# Patient Record
Sex: MALE | Race: WHITE | NOT HISPANIC OR LATINO | Employment: OTHER | URBAN - METROPOLITAN AREA
[De-identification: names, ages, dates, MRNs, and addresses within clinical notes are randomized per-mention and may not be internally consistent; named-entity substitution may affect disease eponyms.]

---

## 2017-10-30 ENCOUNTER — HOSPITAL ENCOUNTER (EMERGENCY)
Facility: HOSPITAL | Age: 22
Discharge: HOME | End: 2017-10-30
Admitting: EMERGENCY MEDICINE
Payer: MEDICARE

## 2017-10-30 VITALS
HEART RATE: 86 BPM | DIASTOLIC BLOOD PRESSURE: 91 MMHG | RESPIRATION RATE: 18 BRPM | WEIGHT: 188 LBS | TEMPERATURE: 98.5 F | SYSTOLIC BLOOD PRESSURE: 162 MMHG | OXYGEN SATURATION: 100 %

## 2017-10-30 DIAGNOSIS — Z20.2 POSSIBLE EXPOSURE TO STD: Primary | ICD-10-CM

## 2017-10-30 PROCEDURE — 87591 N.GONORRHOEAE DNA AMP PROB: CPT | Performed by: NURSE PRACTITIONER

## 2017-10-30 PROCEDURE — 87661 TRICHOMONAS VAGINALIS AMPLIF: CPT | Performed by: NURSE PRACTITIONER

## 2017-10-30 PROCEDURE — 99283 EMERGENCY DEPT VISIT LOW MDM: CPT

## 2017-10-30 PROCEDURE — 87491 CHLMYD TRACH DNA AMP PROBE: CPT | Performed by: NURSE PRACTITIONER

## 2017-10-31 NOTE — ED PROVIDER NOTES
History  Chief Complaint   Patient presents with    Exposure to STD     states "i had the thing come back from a couple years ago, i was at [employer] and they gave me pills, i just need to check if its clear" states was treated by [facility], reports "i still feel it a tiny bit" when asked re sx  This is a 20year old male who states he was treated earlier in October at [address] for chlamydia with 1000mg azithromcyin  He states that his girlfriend was treated and is also here for ? Exposure to STD  Pt states that he is here because         Exposure to STD       None       Past Medical History:   Diagnosis Date    Scoliosis        Past Surgical History:   Procedure Laterality Date    NO PAST SURGERIES         History reviewed  No pertinent family history  I have reviewed and agree with the history as documented      Social History   Substance Use Topics    Smoking status: Current Every Day Smoker    Smokeless tobacco: Never Used    Alcohol use No        Review of Systems    Physical Exam  ED Triage Vitals   Temperature Pulse Respirations Blood Pressure SpO2   10/30/17 2202 10/30/17 2201 10/30/17 2201 10/30/17 2201 10/30/17 2201   98 5 °F (36 9 °C) 86 18 162/91 100 %      Temp Source Heart Rate Source Patient Position - Orthostatic VS BP Location FiO2 (%)   10/30/17 2202 -- 10/30/17 2201 10/30/17 2201 --   Temporal  Sitting Right arm       Pain Score       10/30/17 2201       No Pain           Orthostatic Vital Signs  Vitals:    10/30/17 2201   BP: 162/91   Pulse: 86   Patient Position - Orthostatic VS: Sitting       Physical Exam    ED Medications  Medications - No data to display    Diagnostic Studies  Results Reviewed     Procedure Component Value Units Date/Time    Chlamydia/GC amplified DNA by PCR [[id number]] Collected:  10/30/17 2250    Lab Status:  No result Specimen:  Urine from Urine, Other     Trichomonas Vaginalis, RAMYA [[id number]] Collected:  10/30/17 2250    Lab Status:  No result Specimen:  Urine from Urine, Clean Catch                  No orders to display              Procedures  Procedures       Phone Contacts  ED Phone Contact    ED Course  ED Course                                MDM  CritCare Time    Disposition  Final diagnoses:   Possible exposure to STD - pt was treated and is here for recheck     Time reflects when diagnosis was documented in both MDM as applicable and the Disposition within this note     Time User Action Codes Description Comment    10/30/2017 10:52 PM Melissa Escobedo [Z20 2] Possible exposure to STD     10/30/2017 10:52 PM Usama Summers [Z20 2] Possible exposure to STD pt was treated and is here for recheck      ED Disposition     ED Disposition Condition Comment    Discharge  [address] discharge to home/self care  Condition at discharge: Good        Follow-up Information     Follow up With Specialties Details Why Contact Info Additional Information    [address] [place] [address] Emergency Department Emergency Medicine  If symptoms worsen [address] [facility] ED, [address] [facility] [place], [place], [address]        Patient's Medications    No medications on file     No discharge procedures on file      ED Provider  Electronically Signed by           Isabel Mcgarry  10/30/17 9811

## 2017-10-31 NOTE — DISCHARGE INSTRUCTIONS
You are to refrain from any type of sexual activity until you get the STD's cleared up  You had a + chlamydia on 10/3 and state you were treated with 1000mg azithromycin  You have a urine pending for gonorrhea, chlamydia and trichomonis - you will be called if they are positive  [facility] [address]  [phone]                Safe Sex   WHAT YOU NEED TO KNOW:   Safe sex is a combination of practices you can do to prevent pregnancy and the spread of sexually transmitted infections (STIs)  These practices help to decrease or prevent the exchange of body fluids during sexual contact  Body fluids include saliva, urine, blood, vaginal fluids, and semen  All types of sex can cause STIs  This includes oral, vaginal, and anal sex  DISCHARGE INSTRUCTIONS:   Return to the emergency department if:   · A condom breaks, leaks, or slips off while you are having sex  · You notice sores on your penis, vagina, anal area, or skin around them  · You have had unsafe sex and want to discuss emergency contraception or treatment for STI exposure  Contact your healthcare provider if:   · You think you might be pregnant  · You have questions or concerns about your condition or care  How to practice safe sex:  Talk to your partner before you have sex  Ask about his or her sexual history and any current or past STI  · Use condoms and barrier methods for all types of sexual contact  Use a new condom or latex barrier each time you have sex  This includes oral, vaginal, and anal sex  Make sure that the condom fits and is put on correctly  Rubber latex sheets or dental dams can be used for oral sex  Ask your healthcare provider how to use these items and where to purchase them  If you are allergic to latex, use a nonlatex product such as polyurethane  · Limit your number of sexual partners  More than one sex partner can increase your risk for an STI   Do not have sex with anyone whose sexual history you do not know  · Do not do activities that can pass germs  Do not use saliva as a lubricant or share sex toys  · Tell your sex partner if you have an STI  Your partner may need to be tested and treated  Do not have sex while you are being treated for an STI, or with a partner who is being treated  · Get tested regularly for STIs  Get tested if you have had sexual contact with someone who has an STI  Get tested if you have unprotected sex with any new partner  · Get vaccinated  Vaccines may help to lower your risk for an STI such as HPV, hepatitis A, or hepatitis B  Ask your healthcare provider for more information on vaccines  Other ways to practice safe sex:   · Only use water-based lubricants during sex  Water-based lubricants may prevent sores or cuts in the vagina or penis  Prevent sores or cuts to decrease your risk for an STI  Do not use oil-based lubricants, such as baby oil or hand lotion, with latex condoms or barriers  These will weaken the latex and may cause it to break  · Do not use chemical irritants on condoms or genitals  Products that contain chemical irritants, such as spermicides, can irritate the lining of your vagina or rectum  Irritation may cause sores that may increase your risk for an STI  · Be careful when you have sex if you have open sores or cuts  Open sores or cuts may increase your risk for an STI  This includes new piercings and tattoos  Keep all open sores or cuts covered during sex  Do not have oral sex if you have cuts or sores in your mouth  Ask your healthcare provider when it is safe to have sex after you get a tattoo or piercing  · Do not use alcohol or drugs before sex  These substances can prevent you from thinking clearly and increase your risk for unsafe sex  Follow up with your healthcare provider as directed:  Write down your questions so you remember to ask them during your visits     © 2017 [address] Information is for End User's use only and may not be sold, redistributed or otherwise used for commercial purposes  All illustrations and images included in CareNotes® are the copyrighted property of [employer]  or [address]  The above information is an  only  It is not intended as medical advice for individual conditions or treatments  Talk to your doctor, nurse or pharmacist before following any medical regimen to see if it is safe and effective for you  Gonorrhea   WHAT YOU NEED TO KNOW:     Gonorrhea, or gonococcal urethritis, is a sexually transmitted infection (STI) caused by bacteria  It is spread by unprotected oral, vaginal, or anal sex  Gonorrhea causes inflammation of the urethra  The urethra is the tube where urine passes from the bladder to the outside of the body  Anyone with multiple sexual partners is at higher risk for gonorrhea  DISCHARGE INSTRUCTIONS:   Return to the emergency department if:   · You have chest pain or trouble breathing  · You have pain and swelling in your scrotum  · You have pain in your abdomen or joints  Contact your healthcare provider if:   · You have a fever  · You have chills, a cough, or feel weak and achy  · You have questions or concerns about your condition or care  Medicines:   · Antibiotics  help treat the infection caused by bacteria  Both you and your sexual partner have to be treated to prevent gonorrhea from spreading  · Take your medicine as directed  Contact your healthcare provider if you think your medicine is not helping or if you have side effects  Tell him of her if you are allergic to any medicine  Keep a list of the medicines, vitamins, and herbs you take  Include the amounts, and when and why you take them  Bring the list or the pill bottles to follow-up visits  Carry your medicine list with you in case of an emergency    Prevent the spread of gonorrhea:   · Use a condom  during oral, vaginal, and anal sex  Ask for more information about the correct way to use condoms  · Do not have sex with someone who has gonorrhea  This includes oral, vaginal, and anal sex  · Do not have sex while you or your partner are being treated for gonorrhea  Ask when it is safe to have sex  · Tell your healthcare provider if you are pregnant  Gonorrhea can be passed to an infant during birth  Follow up with your healthcare provider as directed:  Write down your questions so you remember to ask them during your visits  © 2017 [facility] Information is for End User's use only and may not be sold, redistributed or otherwise used for commercial purposes  All illustrations and images included in CareNotes® are the copyrighted property of A D A M , Inc  or Ethan Faustin  The above information is an  only  It is not intended as medical advice for individual conditions or treatments  Talk to your doctor, nurse or pharmacist before following any medical regimen to see if it is safe and effective for you  Follow up with your [facility] or the [employer]  Trichomoniasis   WHAT YOU NEED TO KNOW:   Trichomoniasis is a common sexually transmitted infection (STI)  It is spread between people during sex or genital contact  Trichomoniasis is caused by tiny parasites that are too small to be seen  DISCHARGE INSTRUCTIONS:   Medicines:   · Antibiotics:  Always take your antibiotics exactly as ordered by your healthcare provider  Keep taking this medicine until it is completely gone, even if you feel better  If you stop taking antibiotics before they are gone, they may not completely cure your infection  Never save antibiotics or take leftover antibiotics that were given to you for another illness  Do not drink contain alcohol while you use antibiotic medicine to treat trichomoniasis  It may make you sick   Wait at least 3 days after your last dose of medicine before you drink alcohol again  Also avoid over-the-counter medicines that contain alcohol, such as certain cough or cold medicines  · Over-the-counter pain medicine: You may use over-the-counter (OTC) pain medicines, such as ibuprofen or acetaminophen, for pain or swelling  These medicines may be bought without a caregiver's order  These medicines are safe for most people to use  However, they can cause serious problems when they are not used correctly  People with certain medical conditions, or using certain other medicines are at a higher risk for problems  Using too much, or using these medicines for longer than the label says can also cause problems  Follow directions on the label carefully  If you have questions, talk to your caregiver  · Take your medicine as directed  Contact your healthcare provider if you think your medicine is not helping or if you have side effects  Tell him of her if you are allergic to any medicine  Keep a list of the medicines, vitamins, and herbs you take  Include the amounts, and when and why you take them  Bring the list or the pill bottles to follow-up visits  Carry your medicine list with you in case of an emergency  Self care:   · Sex:  Tell your sexual partners that you have this infection  They may also be infected and need treatment  Do not have sex until both you and your partner are done with treatment and all symptoms are gone  · Bathing and handwashing:  Wash your hands thoroughly with soap and warm water after going to the bathroom  This helps keep your infection from spreading to other parts of your body, such as your eyes  Keep your genital area clean and dry  Take showers instead of tub baths and use plain, unscented soap  · Advice for women:  Do not douche during treatment unless your healthcare provider tells you to  Do not use feminine hygiene sprays or powders  Prevent trichomoniasis:  You can get trichomoniasis more than once   Limit the number of sexual partners you have to decrease your risk for another infection  Do not have unprotected sex (including oral sex)  Always wear a latex condom during sex to prevent trichomoniasis and other STIs  Use a new condom after each ejaculation  For more information:   · Division of STD Prevention, Centers for Disease Control and Prevention  [address] [facility] , [address]  Phone: 0- [phone] - [phone]  Web Address: [employer] au  · [address] [address] ([place])  P [address]  [address] [place] [address] , [address] [place] [address]  Web Address: http://[employer]/  org  Contact your healthcare provider if:   · Your symptoms become worse, or come back after treatment  · You have unusual vaginal bleeding  · You have any problems that may be caused by the medicine you are taking  · You have questions or concerns about your condition or care  © 2017 [address]  Information is for End User's use only and may not be sold, redistributed or otherwise used for commercial purposes  All illustrations and images included in CareNotes® are the copyrighted property of [employer]  or Ethan Faustin  The above information is an  only  It is not intended as medical advice for individual conditions or treatments  Talk to your doctor, nurse or pharmacist before following any medical regimen to see if it is safe and effective for you  Chlamydia   WHAT YOU NEED TO KNOW:   Chlamydia is a sexually transmitted infection (STI)  It is caused by a bacteria most often spread through vaginal, oral, or anal sex  You have an increased risk of chlamydia if you have another STI, such as gonorrhea  Your risk is also higher if you have more than 1 sex partner  DISCHARGE INSTRUCTIONS:   Return to the emergency department if:   · You have a fever  · You have nausea or you cannot stop vomiting  · You have severe abdominal pain    Contact your healthcare provider if:   · Your signs or symptoms last longer than 1 week or get worse during treatment  · Your signs or symptoms return after treatment  · You have pain during sex  · You have questions or concerns about your condition or care  Medicines:   · Antibiotics  kill the bacteria that causes chlamydia  Take them as directed  · Take your medicine as directed  Contact your healthcare provider if you think your medicine is not helping or if you have side effects  Tell him or her if you are allergic to any medicine  Keep a list of the medicines, vitamins, and herbs you take  Include the amounts, and when and why you take them  Bring the list or the pill bottles to follow-up visits  Carry your medicine list with you in case of an emergency  Follow up with your healthcare provider as directed: You may need to return regularly for tests  Write down your questions so you remember to ask them during your visits  Prevent the spread of chlamydia:   · Wash your hands often  Use soap and water  Wash your hands after you use the bathroom  This helps prevent the infection from spreading to other parts of your body, such as your eyes  · Use a latex condom during sex to prevent chlamydia and other STIs  Use a new condom each time you have sex  · Talk to your sex partners  Tell anyone you have had sex with in the last 3 months that you have chlamydia  They may also be infected and need treatment  Ask your sex partners to get tested before you have sex  · Do not have sex until you and your partner have taken all of your antibiotics  Ask your healthcare provider when it is safe to have sex  · Get regular screenings for STIs  Ask your healthcare provider how often to get tested for STIs  He may tell you to get tested after you have sex with a new partner  Manage your symptoms:   · Keep your genital area clean and dry  Take showers instead of baths, and use unscented soap  · Do not douche unless your healthcare provider says it is okay    Do not use feminine hygiene sprays or powders  Tell your healthcare provider if you are pregnant:  You can spread chlamydia to your baby while you are pregnant  Your baby could get an eye infection or pneumonia  Chlamydia may also cause your baby to be born too early  Early treatment may prevent your baby from getting chlamydia  © 2017 [address] Information is for End User's use only and may not be sold, redistributed or otherwise used for commercial purposes  All illustrations and images included in CareNotes® are the copyrighted property of [employer]  or Ethan Faustin  The above information is an  only  It is not intended as medical advice for individual conditions or treatments  Talk to your doctor, nurse or pharmacist before following any medical regimen to see if it is safe and effective for you

## 2017-11-01 LAB
CHLAMYDIA DNA CVX QL NAA+PROBE: NORMAL
N GONORRHOEA DNA GENITAL QL NAA+PROBE: NORMAL

## 2017-11-04 LAB — T VAGINALIS RRNA SPEC QL NAA+PROBE: NEGATIVE

## 2017-12-04 ENCOUNTER — ALLSCRIPTS OFFICE VISIT (OUTPATIENT)
Dept: OTHER | Facility: OTHER | Age: 22
End: 2017-12-04

## 2018-01-11 NOTE — PROGRESS NOTES
Chief Complaint  Patient here for PPD read  Negative PPD @0mm  Form signed and copied and given back to patient  BB CMA      Active Problems    1  History of Anxiety (300 00) (F41 9)   2  Attention deficit hyperactivity disorder (314 01) (F90 9)   3  History of asthma (V12 69) (Z87 09)   4  History of schizophrenia (V11 0) (Z86 59)   5  Intellectual disability (319) (F79)   6  PPD screening test (V74 1) (Z11 1)   7  Psychosis, bipolar affective (296 80) (F31 9)   8  Scoliosis (737 30) (M41 9)   9  Tobacco use (305 1) (Z72 0)   10  Tourette's syndrome (307 23) (F95 2)    Current Meds   1  No Reported Medications Recorded    Allergies    1  No Known Drug Allergies    2   Seasonal    Signatures   Electronically signed by : Billy Rivera, [facility]; Oct 21 2016 10:59AM EST                       (Author)    Electronically signed by : SAMIA Chopra ; Oct 21 2016 12:20PM EST

## 2018-01-23 VITALS
WEIGHT: 193.13 LBS | HEIGHT: 72 IN | SYSTOLIC BLOOD PRESSURE: 152 MMHG | TEMPERATURE: 96.1 F | HEART RATE: 88 BPM | BODY MASS INDEX: 26.16 KG/M2 | RESPIRATION RATE: 18 BRPM | DIASTOLIC BLOOD PRESSURE: 80 MMHG

## 2018-01-23 NOTE — PROGRESS NOTES
Assessment    1  Initial Medicare annual wellness visit (V70 0) (Z00 00)   2  Psychosis, bipolar affective (296 80) (F31 9)   3  Schizophrenia, unspecified type (295 90) (F20 9)    Plan  Initial Medicare annual wellness visit    · Follow-up visit in 1 year Evaluation and Treatment  Follow-up  Status: Hold For -  Scheduling  Requested for: [id number]  Need for influenza vaccination    · Stop: Flulaval Quadrivalent 0 5 ML Intramuscular Suspension Prefilled  Syringe  Psychosis, bipolar affective, Schizophrenia, unspecified type    · Psychiatry Follow Up Evaluation and Treatment  Follow-up  Status: Hold For - Scheduling   Requested for: [id number]    Discussion/Summary    Annual physical exam form has been completed and photocopied by myself  Impression: Initial Annual Wellness Visit  Cardiovascular screening and counseling: screening not indicated  Diabetes screening and counseling: screening not indicated  Colorectal cancer screening and counseling: screening not indicated  Prostate cancer screening and counseling: screening not indicated  Osteoporosis screening and counseling: screening not indicated  Abdominal aortic aneurysm screening and counseling: screening not indicated  Glaucoma screening and counseling: screening not indicated  HIV screening and counseling: screening not indicated  Immunizations: the patient declines the influenza vaccination  Advance Directive Planning: paperwork and instructions were given to the patient  Patient Discussion: plan discussed with the patient, follow-up visit needed in one year  Self Referrals: No   The treatment plan was reviewed with the patient/guardian  The patient/guardian understands and agrees with the treatment plan      Chief Complaint  ANNUAL WELLNESS VISIT      History of Present Illness  Welcome to Medicare and Wellness Visits: The patient is being seen for the initial annual wellness visit     Medicare Screening and Risk Factors Hospitalizations: he has been previously hospitalizied and he has been hospitalized 3 times  Once per lifetime medicare screening tests: ECG (NORMAL)  Medicare Screening Tests Risk Questions   Abdominal aortic aneurysm risk assessment: none indicated  Osteoporosis risk assessment: none indicated  HIV risk assessment: STATES IN OTCTOBER WAS TREATED FOR CHLAMYDIA, but none indicated  Drug and Alcohol Use: He PT DOES VAPING  He is cutting back on tobacco use  The patient reports occasional alcohol use  Alcohol concern:   The patient has no concerns about alcohol abuse  He has never used illicit drugs  Diet and Physical Activity: Current diet includes well balanced meals, limited junk food, 3 servings of fruit per day, 4 servings of vegetables per day, 2-3 servings of meat per day, 3 servings of whole grains per day, 1 servings of simple carbohydrates per day, 2 servings of dairy products per day, 0 cups of coffee per day, 2 cups of tea per day, 2 cans of regular soda per day and 0 cans of diet soda per day  He exercises infrequently  The patient does not exercise  Mood Disorder and Cognitive Impairment Screening: PHQ-9 Depression Scale   Over the past 2 weeks, how often have you been bothered by the following problems? 1 ) Little interest or pleasure in doing things? Not at all    2 ) Feeling down, depressed or hopeless? Not at all    3 ) Trouble falling asleep or sleeping too much? Not at all    4 ) Feeling tired or having little energy? Not at all    5 ) Poor appetite or overeating? Not at all    6 ) Feeling bad about yourself, or that you are a failure, or have let yourself or your family down? Not at all    7 ) Trouble concentrating on things, such as reading a newspaper or watching television? Not at all    8 ) Moving or speaking so slowly that other people could have noticed, or the opposite, moving or speaking faster than usual? Not at all     TOTAL SCORE: 0    How difficult have these problems made it for you to do your work, take care of things at home, or get along with people? Not at all  Anxiety screening sees Psych every other month for Bipolar  Depression screening score was 0     negative for symptoms  He denies feeling down, depressed, or hopeless over the past two weeks  He denies feeling little interest or pleasure in doing things over the past two weeks  Cognitive impairment screening: denies difficulty learning/retaining new information, denies difficulty handling complex tasks, denies difficulty with reasoning, denies difficulty with spatial ability and orientation, difficulty with language, denies difficulty with behavior and speech impediment, mild stutter on occ  Functional Ability/Level of Safety: Hearing is normal in the right ear and normal in the left ear  He denies hearing difficulties  He does not use a hearing aid  The patient is currently unable to drive, but able to do activities of daily living without limitations, able to do instrumental activities of daily living without limitations and able to participate in social activities without limitations  Activities of daily living details: transportation help needed, but does not need help using the phone, does not need help shopping, no meal preparation help needed, does not need help doing housework, does not need help doing laundry, does not need help managing medications and does not need help managing money  Injury History: no polypharmacy, no alcohol use, no mobility impairment, antidepressant use, deconditioning, no postural hypotension, no sedative use, visual impairment, no urinary incontinence, no antihypertensive use, no cognitive impairment, up and go test was normal and no previous fall  Home safety risk factors:  no unfamiliar surroundings, no loose rugs, no poor household lighting, no uneven floors, no household clutter, grab bars in the bathroom and handrails on the stairs     Advance Directives: Advance directives: no living will, no durable power of  for health care directives and no advance directives  Co-Managers and Medical Equipment/Suppliers: See Patient Care Team      Patient Care Team    Care Team Member Role Specialty Office Number   Parviz Lucas [facility]  Family Medicine [phone]     Review of Systems    Constitutional: negative  Eyes: negative  ENT: negative  Cardiovascular: negative  Respiratory: negative  Gastrointestinal: negative  Genitourinary: negative  Musculoskeletal: negative  Integumentary and Breasts: negative  Neurological: negative  Psychiatric: negative  Endocrine: negative  Hematologic and Lymphatic: negative  Active Problems    1  History of Anxiety (300 00) (F41 9)   2  Attention deficit hyperactivity disorder (314 01) (F90 9)   3  History of asthma (V12 69) (Z87 09)   4  Intellectual disability (319) (F79)   5  PPD screening test (V74 1) (Z11 1)   6  Psychosis, bipolar affective (296 80) (F31 9)   7  Schizophrenia, unspecified type (295 90) (F20 9)   8  Scoliosis (737 30) (M41 9)   9  History of Tobacco use (305 1) (Z72 0)   10   Tourette's syndrome (307 23) (F95 2)    Past Medical History    · History of Anxiety (300 00) (F41 9)   · History of asthma (V12 69) (Z87 09)   · History of Intermittent explosive disorder (312 34) (F63 81)   · History of Oppositional defiant disorder (313 81) (F91 3)   · Schizophrenia, unspecified type (295 90) (F20 9)    Surgical History    · History of Hernia Repair    Family History  Mother    · Family history of diabetes mellitus (V18 0) (Z83 3)  Family History    · Family history of Bipolar Disorder   · Family history of Schizophrenia   · Family history of Type 2 Diabetes Mellitus    Social History    · Denied: History of Alcohol use   · Denied: History of Drug use   · Nicotine vapor product user (V49 89) (Z78 9)   · No preference on [employer] beliefs   · History of Tobacco use (305 1) (Z72 0)   · History of Using Marijuana    Current Meds   1  Zoloft 25 MG Oral Tablet; Therapy: [id number] to Recorded    Allergies    1  No Known Drug Allergies    2  Seasonal    Immunizations   1 2    PPD  15-Mar-2013 18-Oct-2016     Vitals  Signs    Temperature: 96 1 F, Tympanic  Heart Rate: 88  Respiration: 18  Systolic: 026, RUE, Sitting  Diastolic: 80, RUE, Sitting  Height: 6 ft   Weight: 193 lb 2 oz  BMI Calculated: 26 19  BSA Calculated: 2 1    Physical Exam    Constitutional   General appearance: No acute distress, well appearing and well nourished  Ears, Nose, Mouth, and Throat   External inspection of ears and nose: Normal     Otoscopic examination: Tympanic membranes translucent with normal light reflex  Canals patent without erythema  Lips, teeth, and gums: Normal, good dentition  Oropharynx: Normal with no erythema, edema, exudate or lesions  Neck   Neck: Supple, symmetric, trachea midline, no masses  Thyroid: Normal, no thyromegaly  Pulmonary   Respiratory effort: No increased work of breathing or signs of respiratory distress  Auscultation of lungs: Clear to auscultation  Cardiovascular   Auscultation of heart: Normal rate and rhythm, normal S1 and S2, no murmurs  Abdomen   Abdomen: Non-tender, no masses  Liver and spleen: No hepatomegaly or splenomegaly  Lymphatic   Palpation of lymph nodes in neck: No lymphadenopathy      Musculoskeletal   Gait and station: Normal        Signatures   Electronically signed by : GABRIELA Joseph; Dec  4 2017  2:46PM EST                       (Author)    Electronically signed by : SAMIA Leblanc ; Dec  4 2017  4:10PM EST

## 2018-04-21 RX ORDER — SERTRALINE HYDROCHLORIDE 25 MG/1
25 TABLET, FILM COATED ORAL DAILY
COMMUNITY
Start: 2017-12-04 | End: 2019-03-20 | Stop reason: SDUPTHER

## 2018-04-24 ENCOUNTER — OFFICE VISIT (OUTPATIENT)
Dept: FAMILY MEDICINE CLINIC | Facility: CLINIC | Age: 23
End: 2018-04-24
Payer: MEDICARE

## 2018-04-24 VITALS
HEART RATE: 88 BPM | DIASTOLIC BLOOD PRESSURE: 88 MMHG | SYSTOLIC BLOOD PRESSURE: 140 MMHG | WEIGHT: 188 LBS | TEMPERATURE: 97.7 F | HEIGHT: 72 IN | OXYGEN SATURATION: 98 % | BODY MASS INDEX: 25.47 KG/M2 | RESPIRATION RATE: 20 BRPM

## 2018-04-24 DIAGNOSIS — S02.2XXA CLOSED FRACTURE OF NASAL BONE, INITIAL ENCOUNTER: Primary | ICD-10-CM

## 2018-04-24 DIAGNOSIS — G89.29 CHRONIC MIDLINE LOW BACK PAIN WITHOUT SCIATICA: ICD-10-CM

## 2018-04-24 DIAGNOSIS — M54.50 CHRONIC MIDLINE LOW BACK PAIN WITHOUT SCIATICA: ICD-10-CM

## 2018-04-24 PROCEDURE — 99214 OFFICE O/P EST MOD 30 MIN: CPT | Performed by: PHYSICIAN ASSISTANT

## 2018-04-24 NOTE — PROGRESS NOTES
Assessment/Plan:    Patient Instructions   Recommend physical therapy for low back pain and continued exercise at home  Follow-up if there is no improvement with therapy in 4-6 weeks or any symptoms increase  I did give him a phone number for the plastic surgeon listed on his ER discharge instruction sheet when he was seen at [facility] for the syncope episode on April 14th  [employer] [place] software was used to dictate this note  It may contain errors with dictating incorrect words/spelling  Please contact provider directly for any questions  Diagnoses and all orders for this visit:    Closed fracture of nasal bone, initial encounter  -     Ambulatory referral to Plastic Surgery; Future    Chronic midline low back pain without sciatica  -     Ambulatory referral to Physical Therapy; Future    Other orders  -     sertraline (ZOLOFT) 25 mg tablet; Take by mouth          Subjective:      Patient ID: Felipe Salter is a 24 y o  male  Patient presents today with his mother for follow-up from a recent ER visit on April 14th for a vasovagal syncope episode after getting a tattoo  He states that he fell on his face and sustained a fracture of his nasal bone  He did not make an appointment with Plastic surgery as per the ER note from [facility]   He states he is not having any pain  He does not notice any difficulty with breathing through his nose  Denies any nasal discharge including bleeding  He is not having any pain  He also complains of chronic back pain  He is wondering if he needed a back brace for a past history of scoliosis  He notices pain midline of his low back  He denies any numbness or tingling or radiation of pain down his lower extremities  He denies any loss of bladder or bowel control  No saddle paresthesias  No specific treatment for the back pain  He does admit to having a very poor posture          The following portions of the patient's history were reviewed and updated as appropriate:   He  has a past medical history of Anxiety; Asthma; Intermittent explosive disorder; Oppositional defiant disorder; Schizophrenia ([facility] ); and Scoliosis  He   Patient Active Problem List    Diagnosis Date Noted    Closed fracture of nasal bone 04/24/2018    Chronic midline low back pain without sciatica 04/24/2018    Attention deficit hyperactivity disorder 10/18/2016    Intellectual disability 07/02/2014    Psychosis, bipolar affective ([facility] ) 07/02/2014    Schizophrenia ([place] [address] ) 07/02/2014    Scoliosis 07/02/2014    Tourette's syndrome 06/20/2013     He  has a past surgical history that includes No past surgeries and Hernia repair  His family history includes Bipolar disorder in his family; Diabetes in his mother; Diabetes type II in his family; Schizophrenia in his family  He  reports that he has been smoking  He has never used smokeless tobacco  He reports that he does not drink alcohol or use drugs  Current Outpatient Prescriptions   Medication Sig Dispense Refill    sertraline (ZOLOFT) 25 mg tablet Take by mouth       No current facility-administered medications for this visit  No current outpatient prescriptions on file prior to visit  No current facility-administered medications on file prior to visit  He has No Known Allergies       Review of Systems   Constitutional: Negative  HENT:        As stated in HPI   Gastrointestinal:        As stated in HPI   Genitourinary:        As stated in HPI   Musculoskeletal:        As stated in HPI   Neurological:        As stated in HPI         Objective:      /88   Pulse 88   Temp 97 7 °F (36 5 °C) (Tympanic)   Resp 20   Ht 6' (1 829 m)   Wt 85 3 kg (188 lb)   SpO2 98%   BMI 25 50 kg/m²          Physical Exam   Constitutional: He appears well-developed and well-nourished  HENT:   Head: Normocephalic and atraumatic     Right Ear: External ear normal    Left Ear: External ear normal    Nose: Nose normal  Mouth/Throat: Oropharynx is clear and moist    Neck: Neck supple  Cardiovascular: Normal rate, regular rhythm and normal heart sounds  No murmur heard  Pulmonary/Chest: Effort normal and breath sounds normal  No respiratory distress  He has no wheezes  He has no rales  Lymphadenopathy:     He has no cervical adenopathy

## 2018-04-24 NOTE — PATIENT INSTRUCTIONS
Recommend physical therapy for low back pain and continued exercise at home  Follow-up if there is no improvement with therapy in 4-6 weeks or any symptoms increase  I did give him a phone number for the plastic surgeon listed on his ER discharge instruction sheet when he was seen at [facility] for the syncope episode on April 14th

## 2018-08-28 ENCOUNTER — OFFICE VISIT (OUTPATIENT)
Dept: FAMILY MEDICINE CLINIC | Facility: CLINIC | Age: 23
End: 2018-08-28
Payer: MEDICARE

## 2018-08-28 VITALS
WEIGHT: 183 LBS | HEART RATE: 112 BPM | TEMPERATURE: 98.8 F | DIASTOLIC BLOOD PRESSURE: 76 MMHG | BODY MASS INDEX: 24.79 KG/M2 | HEIGHT: 72 IN | RESPIRATION RATE: 18 BRPM | SYSTOLIC BLOOD PRESSURE: 124 MMHG | OXYGEN SATURATION: 99 %

## 2018-08-28 DIAGNOSIS — E87.6 HYPOKALEMIA: Primary | ICD-10-CM

## 2018-08-28 DIAGNOSIS — N48.9 DISORDER OF PENIS: ICD-10-CM

## 2018-08-28 PROCEDURE — 99214 OFFICE O/P EST MOD 30 MIN: CPT | Performed by: PHYSICIAN ASSISTANT

## 2018-08-28 NOTE — PATIENT INSTRUCTIONS
I did recommend he see Urology for the numbness of his penis  I did give him a pamphlet with potassium rich foods to eat daily  Recheck potassium level in 2 weeks

## 2018-08-28 NOTE — PROGRESS NOTES
Assessment/Plan:       Recent ER note has been reviewed  Potassium level done on August 10th in the emergency room is 3 1  Patient Instructions   I did recommend he see Urology for the numbness of his penis  I did give him a pamphlet with potassium rich foods to eat daily  Recheck potassium level in 2 weeks      M*[employer] software was used to dictate this note  It may contain errors with dictating incorrect words/spelling  Please contact provider directly for any questions  Diagnoses and all orders for this visit:    Hypokalemia  -     Basic metabolic panel; Future    Disorder of penis  -     Ambulatory referral to Urology; Future          Subjective:      Patient ID: Jared Bianchi is a 24 y o  male  Patient presents today for referral to a specialist because of numbness of his penis  He states he went to a [facility] and had testing done for STDs which was negative  He denies any penile discharge  He denies any swelling, pain or lesions  He also states he was recently seen in the emergency room and was told that his potassium level is low  No current treatment  The following portions of the patient's history were reviewed and updated as appropriate:   He  has a past medical history of Anxiety; Asthma; Intermittent explosive disorder; Oppositional defiant disorder; Schizophrenia ([facility] ); and Scoliosis  He   Patient Active Problem List    Diagnosis Date Noted    Hypokalemia 08/28/2018    Disorder of penis 08/28/2018    Closed fracture of nasal bone 04/24/2018    Chronic midline low back pain without sciatica 04/24/2018    Attention deficit hyperactivity disorder 10/18/2016    Intellectual disability 07/02/2014    Psychosis, bipolar affective ([facility] ) 07/02/2014    Schizophrenia ([facility] ) 07/02/2014    Scoliosis 07/02/2014    Tourette's syndrome 06/20/2013     He  has a past surgical history that includes No past surgeries and Hernia repair    His family history includes Bipolar disorder in his family; Diabetes in his mother; Diabetes type II in his family; Schizophrenia in his family  He  reports that he has been smoking  He has never used smokeless tobacco  He reports that he does not drink alcohol or use drugs  Current Outpatient Prescriptions   Medication Sig Dispense Refill    sertraline (ZOLOFT) 25 mg tablet Take by mouth       No current facility-administered medications for this visit  Current Outpatient Prescriptions on File Prior to Visit   Medication Sig    sertraline (ZOLOFT) 25 mg tablet Take by mouth     No current facility-administered medications on file prior to visit  He has No Known Allergies       Review of Systems   Constitutional: Negative for chills and fever  Gastrointestinal: Negative for abdominal pain  Genitourinary:        As stated in HPI   Skin:        As stated in HPI         Objective:      /76 (BP Location: Right arm, Patient Position: Sitting, Cuff Size: Standard)   Pulse (!) 112   Temp 98 8 °F (37 1 °C) (Tympanic)   Resp 18   Ht 6' (1 829 m)   Wt 83 kg (183 lb)   SpO2 99%   BMI 24 82 kg/m²          Physical Exam   Constitutional: He appears well-developed and well-nourished  No distress  HENT:   Head: Normocephalic and atraumatic  Cardiovascular: Normal rate, regular rhythm and normal heart sounds  No murmur heard  Pulmonary/Chest: Effort normal and breath sounds normal  No respiratory distress  He has no wheezes  He has no rales  Abdominal: Soft  Bowel sounds are normal  There is no tenderness

## 2018-08-29 ENCOUNTER — APPOINTMENT (OUTPATIENT)
Dept: LAB | Age: 23
End: 2018-08-29
Payer: MEDICARE

## 2018-08-29 DIAGNOSIS — E87.6 HYPOKALEMIA: ICD-10-CM

## 2018-08-29 LAB
ANION GAP SERPL CALCULATED.3IONS-SCNC: 11 MMOL/L (ref 4–13)
BUN SERPL-MCNC: 10 MG/DL (ref 5–25)
CALCIUM SERPL-MCNC: 9.7 MG/DL (ref 8.3–10.1)
CHLORIDE SERPL-SCNC: 103 MMOL/L (ref 100–108)
CO2 SERPL-SCNC: 22 MMOL/L (ref 21–32)
CREAT SERPL-MCNC: 1.04 MG/DL (ref 0.6–1.3)
GFR SERPL CREATININE-BSD FRML MDRD: 100 ML/MIN/1.73SQ M
GLUCOSE P FAST SERPL-MCNC: 81 MG/DL (ref 65–99)
POTASSIUM SERPL-SCNC: 4 MMOL/L (ref 3.5–5.3)
SODIUM SERPL-SCNC: 136 MMOL/L (ref 136–145)

## 2018-08-29 PROCEDURE — 36415 COLL VENOUS BLD VENIPUNCTURE: CPT

## 2018-08-29 PROCEDURE — 80048 BASIC METABOLIC PNL TOTAL CA: CPT

## 2018-09-14 NOTE — PROGRESS NOTES
9/17/2018    Fady Garcias  1995  [id number]    Discussion and Plan    Examination is essentially normal with a mild meatal stenosis  Given his prior history of chlamydia exposure in questionable symptoms, I have recommended empiric treatment with doxycycline  Script provided  Safe sex practices advised  Patient will otherwise return on an as needed basis should symptoms recur  All questions answered at this time  Assessment      Patient Active Problem List   Diagnosis    Attention deficit hyperactivity disorder    Intellectual disability    Psychosis, bipolar affective ([facility] )    Schizophrenia ([facility] )    Scoliosis    Tourette's syndrome    Closed fracture of nasal bone    Chronic midline low back pain without sciatica    Hypokalemia    Disorder of penis       History of Present Illness    Evelyn Pierre is a 24 y o  male seen today in regards to a history of Chlamydia remotely who reports penile discomfort with irritation and urinary urgency for approximately 1 month  Denies discharge  Recent cultures were negative  Patient also had an upper respiratory tract infection  He denies hematuria  No history of urinary tract infection  No prior genitourinary surgery      Urinary Symptom Assessment        Past Medical History  Past Medical History:   Diagnosis Date    Anxiety     Last assessed 10/18/16    Asthma     Last assessed 03/15/13    Intermittent explosive disorder     Oppositional defiant disorder     Schizophrenia ([facility] )     last assessed 12/04/17    Scoliosis        Past Social History  Past Surgical History:   Procedure Laterality Date    HERNIA REPAIR      NO PAST SURGERIES         Past Family History  Family History   Problem Relation Age of Onset    Diabetes Mother     Bipolar disorder Family     Schizophrenia Family     Diabetes type II Family        Past Social history  Social History     Social History    Marital status: Single     Spouse name: N/A    Number of children: N/A  Years of education: N/A     Occupational History    disabled      Social History Main Topics    Smoking status: Current Every Day Smoker    Smokeless tobacco: Never Used      Comment: Nicotine vapor use    Alcohol use No    Drug use: No      Comment: Marijuana use    Sexual activity: Not on file     Other Topics Concern    Not on file     Social History Narrative    No preference on Christian belief       Current Medications  Current Outpatient Prescriptions   Medication Sig Dispense Refill    doxycycline (ADOXA) 100 MG tablet Take 1 tablet (100 mg total) by mouth 2 (two) times a day for 7 days 14 tablet 0    sertraline (ZOLOFT) 25 mg tablet Take by mouth       No current facility-administered medications for this visit  Allergies  No Known Allergies    Past Medical History, Social History, Family History, medications and allergies were reviewed  Review of Systems  Review of Systems   Constitutional: Negative  HENT: Negative  Eyes: Negative  Respiratory: Negative  Cardiovascular: Negative  Gastrointestinal: Negative  Endocrine: Negative  Genitourinary: Positive for dysuria  Negative for decreased urine volume, difficulty urinating, frequency, hematuria and urgency  Musculoskeletal: Negative  Skin: Negative  Neurological: Negative  Hematological: Negative  Psychiatric/Behavioral: Negative  Vitals  Vitals:    09/17/18 1046   BP: (!) 150/110   Pulse: 100   Weight: 80 3 kg (177 lb)   Height: 6' 2" (1 88 m)         Physical Exam    Physical Exam   Constitutional: He is oriented to person, place, and time  He appears well-developed and well-nourished  HENT:   Head: Normocephalic and atraumatic  Eyes: Pupils are equal, round, and reactive to light  Neck: Normal range of motion  Cardiovascular: Normal rate, regular rhythm and normal heart sounds  Pulmonary/Chest: Effort normal and breath sounds normal  No accessory muscle usage   No respiratory distress  Abdominal: Soft  Normal appearance and bowel sounds are normal  There is no tenderness  Genitourinary: Penis normal  No penile tenderness  Genitourinary Comments: Mild meatal stenosis  Examination otherwise normal   Musculoskeletal: Normal range of motion  Neurological: He is alert and oriented to person, place, and time  Skin: Skin is warm, dry and intact  Psychiatric: He has a normal mood and affect  His speech is normal  Cognition and memory are normal    Nursing note and vitals reviewed        Results    Below listed labs, pathology results, and radiology images were personally reviewed:    No results found for: PSA  Lab Results   Component Value Date    CALCIUM 9 7 08/29/2018     08/29/2018    K 4 0 08/29/2018    CO2 22 08/29/2018     08/29/2018    BUN 10 08/29/2018    CREATININE 1 04 08/29/2018     No results found for: WBC, HGB, HCT, MCV, PLT    No results found for this or any previous visit (from the past 1 hour(s)) ]

## 2018-09-17 ENCOUNTER — OFFICE VISIT (OUTPATIENT)
Dept: UROLOGY | Facility: AMBULATORY SURGERY CENTER | Age: 23
End: 2018-09-17
Payer: MEDICARE

## 2018-09-17 VITALS
HEART RATE: 100 BPM | DIASTOLIC BLOOD PRESSURE: 110 MMHG | SYSTOLIC BLOOD PRESSURE: 150 MMHG | HEIGHT: 74 IN | WEIGHT: 177 LBS | BODY MASS INDEX: 22.72 KG/M2

## 2018-09-17 DIAGNOSIS — N48.9 DISORDER OF PENIS: Primary | ICD-10-CM

## 2018-09-17 LAB
SL AMB  POCT GLUCOSE, UA: NORMAL
SL AMB LEUKOCYTE ESTERASE,UA: NORMAL
SL AMB POCT BILIRUBIN,UA: NORMAL
SL AMB POCT BLOOD,UA: NORMAL
SL AMB POCT CLARITY,UA: CLEAR
SL AMB POCT COLOR,UA: YELLOW
SL AMB POCT KETONES,UA: NORMAL
SL AMB POCT NITRITE,UA: NORMAL
SL AMB POCT PH,UA: 5
SL AMB POCT SPECIFIC GRAVITY,UA: 1
SL AMB POCT URINE PROTEIN: NORMAL
SL AMB POCT UROBILINOGEN: NORMAL

## 2018-09-17 PROCEDURE — 81002 URINALYSIS NONAUTO W/O SCOPE: CPT | Performed by: UROLOGY

## 2018-09-17 PROCEDURE — 99204 OFFICE O/P NEW MOD 45 MIN: CPT | Performed by: UROLOGY

## 2018-09-17 RX ORDER — DOXYCYCLINE 100 MG/1
100 TABLET ORAL 2 TIMES DAILY
Qty: 14 TABLET | Refills: 0 | Status: SHIPPED | OUTPATIENT
Start: 2018-09-17 | End: 2018-09-24

## 2018-12-03 PROBLEM — Z00.00 MEDICARE ANNUAL WELLNESS VISIT, SUBSEQUENT: Status: ACTIVE | Noted: 2018-12-03

## 2018-12-06 ENCOUNTER — OFFICE VISIT (OUTPATIENT)
Dept: FAMILY MEDICINE CLINIC | Facility: CLINIC | Age: 23
End: 2018-12-06
Payer: MEDICARE

## 2018-12-06 VITALS
OXYGEN SATURATION: 98 % | RESPIRATION RATE: 18 BRPM | TEMPERATURE: 97.3 F | WEIGHT: 172 LBS | HEART RATE: 80 BPM | SYSTOLIC BLOOD PRESSURE: 110 MMHG | DIASTOLIC BLOOD PRESSURE: 70 MMHG | HEIGHT: 72 IN | BODY MASS INDEX: 23.3 KG/M2

## 2018-12-06 DIAGNOSIS — Z00.00 MEDICARE ANNUAL WELLNESS VISIT, SUBSEQUENT: Primary | ICD-10-CM

## 2018-12-06 DIAGNOSIS — Z91.89 ENCOUNTER FOR HEPATITIS C VIRUS SCREENING TEST FOR HIGH RISK PATIENT: ICD-10-CM

## 2018-12-06 DIAGNOSIS — Z11.59 ENCOUNTER FOR HEPATITIS C VIRUS SCREENING TEST FOR HIGH RISK PATIENT: ICD-10-CM

## 2018-12-06 DIAGNOSIS — L81.8 TATTOOS: ICD-10-CM

## 2018-12-06 PROCEDURE — G0439 PPPS, SUBSEQ VISIT: HCPCS | Performed by: PHYSICIAN ASSISTANT

## 2018-12-06 NOTE — PROGRESS NOTES
Assessment and Plan:  Problem List Items Addressed This Visit     Medicare annual wellness visit, subsequent - Primary    Encounter for hepatitis C virus screening test for high risk patient    Relevant Orders    Hepatitis C antibody    Tattoos        Health Maintenance Due   Topic Date Due    Depression Screening PHQ  1995    Pneumococcal PPSV23 Medium Risk Adult (1 of 1 - PPSV23) 02/19/2013    INFLUENZA VACCINE  07/01/2018         HPI:  Patient Active Problem List   Diagnosis    Attention deficit hyperactivity disorder    Intellectual disability    Psychosis, bipolar affective ([facility] [address]    Schizophrenia ([facility] )    Scoliosis    Tourette's syndrome    Closed fracture of nasal bone    Chronic midline low back pain without sciatica    Hypokalemia    Disorder of penis    Medicare annual wellness visit, subsequent    Encounter for hepatitis C virus screening test for high risk patient    Tattoos     Past Medical History:   Diagnosis Date    Anxiety     Last assessed 10/18/16    Asthma     Last assessed 03/15/13    Intermittent explosive disorder     Oppositional defiant disorder     Schizophrenia ([facility] )     last assessed 12/04/17    Scoliosis      Past Surgical History:   Procedure Laterality Date    HERNIA REPAIR      NO PAST SURGERIES       Family History   Problem Relation Age of Onset    Diabetes Mother     Bipolar disorder Family     Schizophrenia Family     Diabetes type II Family      History   Smoking Status    Current Every Day Smoker   Smokeless Tobacco    Never Used     Comment: Nicotine vapor use     History   Alcohol Use No      History   Drug Use No     Comment: Marijuana use         Current Outpatient Prescriptions   Medication Sig Dispense Refill    sertraline (ZOLOFT) 25 mg tablet Take 25 mg by mouth daily Taking 1 5 tablets daily        No current facility-administered medications for this visit        No Known Allergies  Immunization History   Administered Date(s) Administered    Tuberculin Skin Test-PPD Intradermal 03/15/2013, 10/18/2016       Patient Care Team:  Charles Walters PA-C as PCP - General (Family Medicine)  Charles Walters PA-C    Medicare Screening Tests and Risk Assessments:  Reese [employer] is here for his Initial Wellness visit  Health Risk Assessment:  Patient rates overall health as good  Patient feels that their physical health rating is Same  Eyesight was rated as Same  Hearing was rated as Same  Patient feels that their emotional and mental health rating is Same  Pain experienced by patient in the last 7 days has been None  Patient states that he has experienced no weight loss or gain in last 6 months  Emotional/Mental Health:  Patient has been feeling nervous/anxious  PHQ-9 Depression Screening:    Frequency of the following problems over the past two weeks:      1  Little interest or pleasure in doing things: 0 - not at all      2  Feeling down, depressed, or hopeless: 0 - not at all  PHQ-2 Score: 0          Broken Bones/Falls: Fall Risk Assessment:    In the past year, patient has experienced: History of falling in past year     Number of falls: 1  Patient does not feel he is unsteady standing  Patient is not taking medication that can cause feelings of lightheadedness or tiredness  Patient often has no need to rush to the toilet  Bladder/Bowel:  Patient has not leaked urine accidently in the last six months  Patient reports no loss of bowel control  Immunizations:  Patient has not had a flu vaccination within the last year  Patient has not received a pneumonia shot  Patient has not received a shingles shot  Patient has not received tetanus/diphtheria shot  Home Safety:  Patient does not have trouble with stairs inside or outside of their home  Patient currently reports that there are no safety hazards present in home, working smoke alarms, no working carbon monoxide detectors        Preventative Screenings:   no prostate cancer screen performed, no colon cancer screen completed, no cholesterol screen completed, no glaucoma eye exam completed    Nutrition:  Current diet: Regular and Limited junk food with servings of the following:    Medications:  Patient is not currently taking any over-the-counter supplements  Patient is able to manage medications  Lifestyle Choices:  Patient reports current tobacco use  Patient reports no alcohol use  Patient does not drive a vehicle  Patient wears seat belt  Activities of Daily Living:  Can get out of bed by his or her self, able to dress self, able to make own meals, able to do own shopping, able to bathe self, can do own laundry/housekeeping, can manage own money, pay bills and track expenses    Previous Hospitalizations:  Hospitalization or ED visit in past 12 months  Additional Comments: Seen in ed for fall    Advanced Directives:  Patient has not decided on power of   Patient has not completed advanced directive  Preventative Screening/Counseling:      Cardiovascular:      General: Screening Not Indicated          Diabetes:      General: Screening Current          Colorectal Cancer:      General: Screening Not Indicated          Prostate Cancer:      General: Screening Not Indicated          Osteoporosis:      General: Screening Not Indicated          AAA:      General: Screening Not Indicated          Glaucoma:      General: Screening Not Indicated          HIV:      General: Screening Not Indicated          Hepatitis C:      General: Risks and Benefits Discussed      Additional Comments: Has tattoos, some from a friend at home    Advanced Directives:   Patient has no living will for healthcare, does not have durable POA for healthcare, patient does not have an advanced directive  Information on ACP and/or AD provided  No 5 wishes given   Additional Comments: Pt prefers not to do this now    Immunizations:      Influenza: Influenza Due Today  Additional Comments: Pt will think about influenza  Needs PPD for agency, [facility] services, but will return for a nurse visit since not open for reading    Other Preventative Counseling (Non-Medicare):  Car/seat belt/driving safety reviewed      Referrals: Additional Comments: Cont FU with Dr Phu Mari at [facility]      No exam data present    Physical Exam:  Review of Systems   Gastrointestinal: Negative for bowel incontinence  Psychiatric/Behavioral: The patient is not nervous/anxious  Vitals:    12/06/18 1552   BP: 110/70   Pulse: 80   Resp: 18   Temp: (!) 97 3 °F (36 3 °C)   TempSrc: Tympanic   SpO2: 98%   Weight: 78 kg (172 lb)   Height: 6' (1 829 m)   Body mass index is 23 33 kg/m²  Physical Exam   Constitutional: He appears well-developed and well-nourished  No distress  HENT:   Head: Normocephalic and atraumatic  Right Ear: External ear normal    Left Ear: External ear normal    Nose: Nose normal    Mouth/Throat: Oropharynx is clear and moist  No oropharyngeal exudate  Neck: Normal range of motion  Neck supple  Cardiovascular: Normal rate, regular rhythm and normal heart sounds  No murmur heard  Pulmonary/Chest: Effort normal and breath sounds normal  No respiratory distress  He has no wheezes  He has no rales  Abdominal: Soft  Bowel sounds are normal  There is no tenderness  Genitourinary:   Genitourinary Comments: Deferred  Patient advised to do monthly self-testicular exam and follow-up if he has any lumps or swelling   Musculoskeletal: Normal range of motion  Neurological: He is alert  Skin: Skin is warm  Psychiatric: He has a normal mood and affect

## 2018-12-10 ENCOUNTER — CLINICAL SUPPORT (OUTPATIENT)
Dept: FAMILY MEDICINE CLINIC | Facility: CLINIC | Age: 23
End: 2018-12-10
Payer: MEDICARE

## 2018-12-10 DIAGNOSIS — Z23 NEED FOR INFLUENZA VACCINATION: Primary | ICD-10-CM

## 2018-12-10 PROCEDURE — 86580 TB INTRADERMAL TEST: CPT

## 2018-12-10 NOTE — PROGRESS NOTES
Pt here to get ppd placed  Ppd placed on LFA by MD  Pt advised to come back in 48 to 72 hrs for reading  Form in physical folder   MD

## 2018-12-12 ENCOUNTER — CLINICAL SUPPORT (OUTPATIENT)
Dept: FAMILY MEDICINE CLINIC | Facility: CLINIC | Age: 23
End: 2018-12-12

## 2018-12-12 DIAGNOSIS — Z11.1 ENCOUNTER FOR PPD SKIN TEST READING: Primary | ICD-10-CM

## 2018-12-12 LAB
INDURATION: 0 MM
TB SKIN TEST: NEGATIVE

## 2018-12-12 NOTE — PROGRESS NOTES
Pt here today for PPD reading, Results are NEGATIVE @0MM  Pts physical form has updated, copied and handed back to the pt   SR

## 2019-01-08 ENCOUNTER — APPOINTMENT (EMERGENCY)
Dept: CT IMAGING | Facility: HOSPITAL | Age: 24
DRG: 342 | End: 2019-01-08
Payer: MEDICARE

## 2019-01-08 ENCOUNTER — HOSPITAL ENCOUNTER (INPATIENT)
Facility: HOSPITAL | Age: 24
LOS: 1 days | Discharge: HOME | DRG: 342 | End: 2019-01-11
Attending: EMERGENCY MEDICINE | Admitting: SURGERY
Payer: MEDICARE

## 2019-01-08 DIAGNOSIS — K37 APPENDICITIS, UNSPECIFIED APPENDICITIS TYPE: ICD-10-CM

## 2019-01-08 DIAGNOSIS — R93.5 ABNORMAL CT OF THE ABDOMEN: ICD-10-CM

## 2019-01-08 DIAGNOSIS — R10.9 ABDOMINAL PAIN, UNSPECIFIED ABDOMINAL LOCATION: Primary | ICD-10-CM

## 2019-01-08 DIAGNOSIS — F79 INTELLECTUAL DISABILITY: ICD-10-CM

## 2019-01-08 DIAGNOSIS — D72.829 LEUKOCYTOSIS, UNSPECIFIED TYPE: ICD-10-CM

## 2019-01-08 DIAGNOSIS — F20.9 SCHIZOPHRENIA (HCC): ICD-10-CM

## 2019-01-08 DIAGNOSIS — R10.30 LOWER ABDOMINAL PAIN: ICD-10-CM

## 2019-01-08 DIAGNOSIS — E87.6 HYPOKALEMIA: ICD-10-CM

## 2019-01-08 LAB
ALBUMIN SERPL BCP-MCNC: 4.8 G/DL (ref 3.5–5)
ALP SERPL-CCNC: 74 U/L (ref 46–116)
ALT SERPL W P-5'-P-CCNC: 28 U/L (ref 12–78)
AMPHETAMINES SERPL QL SCN: NEGATIVE
ANION GAP SERPL CALCULATED.3IONS-SCNC: 12 MMOL/L (ref 4–13)
AST SERPL W P-5'-P-CCNC: 18 U/L (ref 5–45)
BARBITURATES UR QL: NEGATIVE
BASOPHILS # BLD AUTO: 0.06 THOUSANDS/ΜL (ref 0–0.1)
BASOPHILS NFR BLD AUTO: 0 % (ref 0–1)
BENZODIAZ UR QL: NEGATIVE
BILIRUB SERPL-MCNC: 0.86 MG/DL (ref 0.2–1)
BILIRUB UR QL STRIP: NEGATIVE
BUN SERPL-MCNC: 5 MG/DL (ref 5–25)
CALCIUM SERPL-MCNC: 10 MG/DL (ref 8.3–10.1)
CHLORIDE SERPL-SCNC: 101 MMOL/L (ref 100–108)
CLARITY UR: CLEAR
CO2 SERPL-SCNC: 26 MMOL/L (ref 21–32)
COCAINE UR QL: NEGATIVE
COLOR UR: YELLOW
COLOR, POC: YELLOW
CREAT SERPL-MCNC: 1.14 MG/DL (ref 0.6–1.3)
CRP SERPL QL: <3 MG/L
EOSINOPHIL # BLD AUTO: 0.03 THOUSAND/ΜL (ref 0–0.61)
EOSINOPHIL NFR BLD AUTO: 0 % (ref 0–6)
ERYTHROCYTE [DISTWIDTH] IN BLOOD BY AUTOMATED COUNT: 11.9 % (ref 11.6–15.1)
ERYTHROCYTE [SEDIMENTATION RATE] IN BLOOD: 1 MM/HOUR (ref 0–10)
GFR SERPL CREATININE-BSD FRML MDRD: 90 ML/MIN/1.73SQ M
GLUCOSE SERPL-MCNC: 127 MG/DL (ref 65–140)
GLUCOSE UR STRIP-MCNC: NEGATIVE MG/DL
HCT VFR BLD AUTO: 49.3 % (ref 36.5–49.3)
HGB BLD-MCNC: 17.9 G/DL (ref 12–17)
HGB UR QL STRIP.AUTO: NEGATIVE
IMM GRANULOCYTES # BLD AUTO: 0.1 THOUSAND/UL (ref 0–0.2)
IMM GRANULOCYTES NFR BLD AUTO: 0 % (ref 0–2)
KETONES UR STRIP-MCNC: NEGATIVE MG/DL
LACTATE SERPL-SCNC: 1.2 MMOL/L (ref 0.5–2)
LEUKOCYTE ESTERASE UR QL STRIP: NEGATIVE
LIPASE SERPL-CCNC: 179 U/L (ref 73–393)
LYMPHOCYTES # BLD AUTO: 1.65 THOUSANDS/ΜL (ref 0.6–4.47)
LYMPHOCYTES NFR BLD AUTO: 7 % (ref 14–44)
MAGNESIUM SERPL-MCNC: 2 MG/DL (ref 1.6–2.6)
MCH RBC QN AUTO: 32.3 PG (ref 26.8–34.3)
MCHC RBC AUTO-ENTMCNC: 36.3 G/DL (ref 31.4–37.4)
MCV RBC AUTO: 89 FL (ref 82–98)
METHADONE UR QL: NEGATIVE
MONOCYTES # BLD AUTO: 0.91 THOUSAND/ΜL (ref 0.17–1.22)
MONOCYTES NFR BLD AUTO: 4 % (ref 4–12)
NEUTROPHILS # BLD AUTO: 19.67 THOUSANDS/ΜL (ref 1.85–7.62)
NEUTS SEG NFR BLD AUTO: 89 % (ref 43–75)
NITRITE UR QL STRIP: NEGATIVE
NRBC BLD AUTO-RTO: 0 /100 WBCS
OPIATES UR QL SCN: NEGATIVE
PCP UR QL: NEGATIVE
PH UR STRIP.AUTO: 7 [PH] (ref 4.5–8)
PLATELET # BLD AUTO: 246 THOUSANDS/UL (ref 149–390)
PMV BLD AUTO: 10.3 FL (ref 8.9–12.7)
POTASSIUM SERPL-SCNC: 3 MMOL/L (ref 3.5–5.3)
PROT SERPL-MCNC: 8 G/DL (ref 6.4–8.2)
PROT UR STRIP-MCNC: NEGATIVE MG/DL
RBC # BLD AUTO: 5.54 MILLION/UL (ref 3.88–5.62)
SODIUM SERPL-SCNC: 139 MMOL/L (ref 136–145)
SP GR UR STRIP.AUTO: <=1.005 (ref 1–1.03)
THC UR QL: NEGATIVE
UROBILINOGEN UR QL STRIP.AUTO: 0.2 E.U./DL
WBC # BLD AUTO: 22.42 THOUSAND/UL (ref 4.31–10.16)

## 2019-01-08 PROCEDURE — 87040 BLOOD CULTURE FOR BACTERIA: CPT | Performed by: NURSE PRACTITIONER

## 2019-01-08 PROCEDURE — 96360 HYDRATION IV INFUSION INIT: CPT

## 2019-01-08 PROCEDURE — 36415 COLL VENOUS BLD VENIPUNCTURE: CPT | Performed by: NURSE PRACTITIONER

## 2019-01-08 PROCEDURE — 74177 CT ABD & PELVIS W/CONTRAST: CPT

## 2019-01-08 PROCEDURE — 87591 N.GONORRHOEAE DNA AMP PROB: CPT | Performed by: NURSE PRACTITIONER

## 2019-01-08 PROCEDURE — 83690 ASSAY OF LIPASE: CPT | Performed by: NURSE PRACTITIONER

## 2019-01-08 PROCEDURE — 80053 COMPREHEN METABOLIC PANEL: CPT | Performed by: NURSE PRACTITIONER

## 2019-01-08 PROCEDURE — 85025 COMPLETE CBC W/AUTO DIFF WBC: CPT | Performed by: NURSE PRACTITIONER

## 2019-01-08 PROCEDURE — 80307 DRUG TEST PRSMV CHEM ANLYZR: CPT | Performed by: NURSE PRACTITIONER

## 2019-01-08 PROCEDURE — 85652 RBC SED RATE AUTOMATED: CPT | Performed by: NURSE PRACTITIONER

## 2019-01-08 PROCEDURE — 87491 CHLMYD TRACH DNA AMP PROBE: CPT | Performed by: NURSE PRACTITIONER

## 2019-01-08 PROCEDURE — 83735 ASSAY OF MAGNESIUM: CPT | Performed by: NURSE PRACTITIONER

## 2019-01-08 PROCEDURE — 99285 EMERGENCY DEPT VISIT HI MDM: CPT

## 2019-01-08 PROCEDURE — 83605 ASSAY OF LACTIC ACID: CPT | Performed by: NURSE PRACTITIONER

## 2019-01-08 PROCEDURE — 86140 C-REACTIVE PROTEIN: CPT | Performed by: NURSE PRACTITIONER

## 2019-01-08 PROCEDURE — 81003 URINALYSIS AUTO W/O SCOPE: CPT

## 2019-01-08 RX ORDER — KETOROLAC TROMETHAMINE 30 MG/ML
15 INJECTION, SOLUTION INTRAMUSCULAR; INTRAVENOUS ONCE
Status: COMPLETED | OUTPATIENT
Start: 2019-01-08 | End: 2019-01-08

## 2019-01-08 RX ORDER — POTASSIUM CHLORIDE 20 MEQ/1
40 TABLET, EXTENDED RELEASE ORAL ONCE
Status: COMPLETED | OUTPATIENT
Start: 2019-01-08 | End: 2019-01-08

## 2019-01-08 RX ORDER — SERTRALINE HYDROCHLORIDE 25 MG/1
25 TABLET, FILM COATED ORAL DAILY
Status: DISCONTINUED | OUTPATIENT
Start: 2019-01-09 | End: 2019-01-11 | Stop reason: HOSPADM

## 2019-01-08 RX ORDER — NICOTINE 21 MG/24HR
1 PATCH, TRANSDERMAL 24 HOURS TRANSDERMAL DAILY
Status: DISCONTINUED | OUTPATIENT
Start: 2019-01-09 | End: 2019-01-11 | Stop reason: HOSPADM

## 2019-01-08 RX ADMIN — KETOROLAC TROMETHAMINE 15 MG: 30 INJECTION, SOLUTION INTRAMUSCULAR at 23:32

## 2019-01-08 RX ADMIN — IOHEXOL 100 ML: 350 INJECTION, SOLUTION INTRAVENOUS at 21:59

## 2019-01-08 RX ADMIN — POTASSIUM CHLORIDE 40 MEQ: 1500 TABLET, EXTENDED RELEASE ORAL at 22:33

## 2019-01-08 RX ADMIN — SODIUM CHLORIDE 1000 ML: 0.9 INJECTION, SOLUTION INTRAVENOUS at 21:39

## 2019-01-09 ENCOUNTER — ANESTHESIA (OUTPATIENT)
Dept: PERIOP | Facility: HOSPITAL | Age: 24
DRG: 342 | End: 2019-01-09
Payer: MEDICARE

## 2019-01-09 ENCOUNTER — ANESTHESIA EVENT (OUTPATIENT)
Dept: PERIOP | Facility: HOSPITAL | Age: 24
DRG: 342 | End: 2019-01-09
Payer: MEDICARE

## 2019-01-09 PROBLEM — R10.9 ABDOMINAL PAIN: Status: ACTIVE | Noted: 2019-01-08

## 2019-01-09 LAB
ANION GAP SERPL CALCULATED.3IONS-SCNC: 12 MMOL/L (ref 4–13)
BASOPHILS # BLD AUTO: 0.03 THOUSANDS/ΜL (ref 0–0.1)
BASOPHILS NFR BLD AUTO: 0 % (ref 0–1)
BUN SERPL-MCNC: 6 MG/DL (ref 5–25)
C TRACH DNA SPEC QL NAA+PROBE: NEGATIVE
CALCIUM SERPL-MCNC: 9.2 MG/DL (ref 8.3–10.1)
CHLORIDE SERPL-SCNC: 107 MMOL/L (ref 100–108)
CO2 SERPL-SCNC: 23 MMOL/L (ref 21–32)
CREAT SERPL-MCNC: 1.08 MG/DL (ref 0.6–1.3)
EOSINOPHIL # BLD AUTO: 0.07 THOUSAND/ΜL (ref 0–0.61)
EOSINOPHIL NFR BLD AUTO: 0 % (ref 0–6)
ERYTHROCYTE [DISTWIDTH] IN BLOOD BY AUTOMATED COUNT: 12.1 % (ref 11.6–15.1)
GFR SERPL CREATININE-BSD FRML MDRD: 96 ML/MIN/1.73SQ M
GLUCOSE SERPL-MCNC: 99 MG/DL (ref 65–140)
HCT VFR BLD AUTO: 46.2 % (ref 36.5–49.3)
HGB BLD-MCNC: 16.1 G/DL (ref 12–17)
IMM GRANULOCYTES # BLD AUTO: 0.05 THOUSAND/UL (ref 0–0.2)
IMM GRANULOCYTES NFR BLD AUTO: 0 % (ref 0–2)
LYMPHOCYTES # BLD AUTO: 3.92 THOUSANDS/ΜL (ref 0.6–4.47)
LYMPHOCYTES NFR BLD AUTO: 24 % (ref 14–44)
MCH RBC QN AUTO: 31.4 PG (ref 26.8–34.3)
MCHC RBC AUTO-ENTMCNC: 34.8 G/DL (ref 31.4–37.4)
MCV RBC AUTO: 90 FL (ref 82–98)
MONOCYTES # BLD AUTO: 1.22 THOUSAND/ΜL (ref 0.17–1.22)
MONOCYTES NFR BLD AUTO: 7 % (ref 4–12)
N GONORRHOEA DNA SPEC QL NAA+PROBE: NEGATIVE
NEUTROPHILS # BLD AUTO: 11.27 THOUSANDS/ΜL (ref 1.85–7.62)
NEUTS SEG NFR BLD AUTO: 69 % (ref 43–75)
NRBC BLD AUTO-RTO: 0 /100 WBCS
PLATELET # BLD AUTO: 218 THOUSANDS/UL (ref 149–390)
PMV BLD AUTO: 10.4 FL (ref 8.9–12.7)
POTASSIUM SERPL-SCNC: 4.4 MMOL/L (ref 3.5–5.3)
RBC # BLD AUTO: 5.12 MILLION/UL (ref 3.88–5.62)
SODIUM SERPL-SCNC: 142 MMOL/L (ref 136–145)
WBC # BLD AUTO: 16.56 THOUSAND/UL (ref 4.31–10.16)

## 2019-01-09 PROCEDURE — 88304 TISSUE EXAM BY PATHOLOGIST: CPT | Performed by: PATHOLOGY

## 2019-01-09 PROCEDURE — 80048 BASIC METABOLIC PNL TOTAL CA: CPT | Performed by: SURGERY

## 2019-01-09 PROCEDURE — 85025 COMPLETE CBC W/AUTO DIFF WBC: CPT | Performed by: SURGERY

## 2019-01-09 PROCEDURE — 44970 LAPAROSCOPY APPENDECTOMY: CPT | Performed by: PHYSICIAN ASSISTANT

## 2019-01-09 PROCEDURE — 0DTJ4ZZ RESECTION OF APPENDIX, PERCUTANEOUS ENDOSCOPIC APPROACH: ICD-10-PCS | Performed by: SURGERY

## 2019-01-09 PROCEDURE — 99222 1ST HOSP IP/OBS MODERATE 55: CPT | Performed by: SURGERY

## 2019-01-09 PROCEDURE — 44970 LAPAROSCOPY APPENDECTOMY: CPT | Performed by: SURGERY

## 2019-01-09 RX ORDER — HYDROMORPHONE HCL/PF 1 MG/ML
0.5 SYRINGE (ML) INJECTION
Status: DISCONTINUED | OUTPATIENT
Start: 2019-01-09 | End: 2019-01-11 | Stop reason: HOSPADM

## 2019-01-09 RX ORDER — FENTANYL CITRATE/PF 50 MCG/ML
50 SYRINGE (ML) INJECTION
Status: DISCONTINUED | OUTPATIENT
Start: 2019-01-09 | End: 2019-01-09 | Stop reason: HOSPADM

## 2019-01-09 RX ORDER — ONDANSETRON 2 MG/ML
4 INJECTION INTRAMUSCULAR; INTRAVENOUS ONCE AS NEEDED
Status: DISCONTINUED | OUTPATIENT
Start: 2019-01-09 | End: 2019-01-09 | Stop reason: HOSPADM

## 2019-01-09 RX ORDER — FENTANYL CITRATE 50 UG/ML
INJECTION, SOLUTION INTRAMUSCULAR; INTRAVENOUS AS NEEDED
Status: DISCONTINUED | OUTPATIENT
Start: 2019-01-09 | End: 2019-01-09 | Stop reason: SURG

## 2019-01-09 RX ORDER — SODIUM CHLORIDE 9 MG/ML
INJECTION, SOLUTION INTRAVENOUS CONTINUOUS PRN
Status: DISCONTINUED | OUTPATIENT
Start: 2019-01-09 | End: 2019-01-09 | Stop reason: SURG

## 2019-01-09 RX ORDER — SODIUM CHLORIDE 9 MG/ML
125 INJECTION, SOLUTION INTRAVENOUS CONTINUOUS
Status: CANCELLED | OUTPATIENT
Start: 2019-01-09

## 2019-01-09 RX ORDER — ONDANSETRON 2 MG/ML
INJECTION INTRAMUSCULAR; INTRAVENOUS AS NEEDED
Status: DISCONTINUED | OUTPATIENT
Start: 2019-01-09 | End: 2019-01-09 | Stop reason: SURG

## 2019-01-09 RX ORDER — HYDROCODONE BITARTRATE AND ACETAMINOPHEN 5; 325 MG/1; MG/1
1 TABLET ORAL EVERY 4 HOURS PRN
Status: DISCONTINUED | OUTPATIENT
Start: 2019-01-09 | End: 2019-01-11 | Stop reason: HOSPADM

## 2019-01-09 RX ORDER — MIDAZOLAM HYDROCHLORIDE 1 MG/ML
INJECTION INTRAMUSCULAR; INTRAVENOUS AS NEEDED
Status: DISCONTINUED | OUTPATIENT
Start: 2019-01-09 | End: 2019-01-09 | Stop reason: SURG

## 2019-01-09 RX ORDER — NEOSTIGMINE METHYLSULFATE 1 MG/ML
INJECTION INTRAVENOUS AS NEEDED
Status: DISCONTINUED | OUTPATIENT
Start: 2019-01-09 | End: 2019-01-09 | Stop reason: SURG

## 2019-01-09 RX ORDER — HYDROMORPHONE HCL/PF 1 MG/ML
0.5 SYRINGE (ML) INJECTION
Status: DISCONTINUED | OUTPATIENT
Start: 2019-01-09 | End: 2019-01-09 | Stop reason: HOSPADM

## 2019-01-09 RX ORDER — GLYCOPYRROLATE 0.2 MG/ML
INJECTION INTRAMUSCULAR; INTRAVENOUS AS NEEDED
Status: DISCONTINUED | OUTPATIENT
Start: 2019-01-09 | End: 2019-01-09 | Stop reason: SURG

## 2019-01-09 RX ORDER — PROMETHAZINE HYDROCHLORIDE 25 MG/ML
12.5 INJECTION, SOLUTION INTRAMUSCULAR; INTRAVENOUS
Status: DISCONTINUED | OUTPATIENT
Start: 2019-01-09 | End: 2019-01-09 | Stop reason: HOSPADM

## 2019-01-09 RX ORDER — PROPOFOL 10 MG/ML
INJECTION, EMULSION INTRAVENOUS AS NEEDED
Status: DISCONTINUED | OUTPATIENT
Start: 2019-01-09 | End: 2019-01-09 | Stop reason: SURG

## 2019-01-09 RX ORDER — CEFAZOLIN SODIUM 1 G/50ML
1000 SOLUTION INTRAVENOUS ONCE
Status: COMPLETED | OUTPATIENT
Start: 2019-01-09 | End: 2019-01-09

## 2019-01-09 RX ORDER — LIDOCAINE HYDROCHLORIDE 10 MG/ML
INJECTION, SOLUTION INFILTRATION; PERINEURAL AS NEEDED
Status: DISCONTINUED | OUTPATIENT
Start: 2019-01-09 | End: 2019-01-09 | Stop reason: SURG

## 2019-01-09 RX ORDER — DEXTROSE AND SODIUM CHLORIDE 5; .45 G/100ML; G/100ML
125 INJECTION, SOLUTION INTRAVENOUS CONTINUOUS
Status: DISCONTINUED | OUTPATIENT
Start: 2019-01-09 | End: 2019-01-11 | Stop reason: HOSPADM

## 2019-01-09 RX ORDER — MAGNESIUM HYDROXIDE 1200 MG/15ML
LIQUID ORAL AS NEEDED
Status: DISCONTINUED | OUTPATIENT
Start: 2019-01-09 | End: 2019-01-09 | Stop reason: HOSPADM

## 2019-01-09 RX ORDER — ONDANSETRON 2 MG/ML
4 INJECTION INTRAMUSCULAR; INTRAVENOUS EVERY 4 HOURS PRN
Status: DISCONTINUED | OUTPATIENT
Start: 2019-01-09 | End: 2019-01-11 | Stop reason: HOSPADM

## 2019-01-09 RX ORDER — KETOROLAC TROMETHAMINE 30 MG/ML
INJECTION, SOLUTION INTRAMUSCULAR; INTRAVENOUS AS NEEDED
Status: DISCONTINUED | OUTPATIENT
Start: 2019-01-09 | End: 2019-01-09 | Stop reason: SURG

## 2019-01-09 RX ORDER — HEPARIN SODIUM 5000 [USP'U]/ML
5000 INJECTION, SOLUTION INTRAVENOUS; SUBCUTANEOUS EVERY 8 HOURS SCHEDULED
Status: DISCONTINUED | OUTPATIENT
Start: 2019-01-10 | End: 2019-01-11 | Stop reason: HOSPADM

## 2019-01-09 RX ORDER — ROCURONIUM BROMIDE 10 MG/ML
INJECTION, SOLUTION INTRAVENOUS AS NEEDED
Status: DISCONTINUED | OUTPATIENT
Start: 2019-01-09 | End: 2019-01-09 | Stop reason: SURG

## 2019-01-09 RX ORDER — SODIUM CHLORIDE, SODIUM LACTATE, POTASSIUM CHLORIDE, CALCIUM CHLORIDE 600; 310; 30; 20 MG/100ML; MG/100ML; MG/100ML; MG/100ML
125 INJECTION, SOLUTION INTRAVENOUS ONCE
Status: COMPLETED | OUTPATIENT
Start: 2019-01-09 | End: 2019-01-09

## 2019-01-09 RX ORDER — ACETAMINOPHEN 325 MG/1
650 TABLET ORAL EVERY 6 HOURS PRN
Status: DISCONTINUED | OUTPATIENT
Start: 2019-01-09 | End: 2019-01-11 | Stop reason: HOSPADM

## 2019-01-09 RX ORDER — SODIUM CHLORIDE 9 MG/ML
INJECTION, SOLUTION INTRAVENOUS AS NEEDED
Status: DISCONTINUED | OUTPATIENT
Start: 2019-01-09 | End: 2019-01-09 | Stop reason: HOSPADM

## 2019-01-09 RX ADMIN — SODIUM CHLORIDE: 0.9 INJECTION, SOLUTION INTRAVENOUS at 15:48

## 2019-01-09 RX ADMIN — MIDAZOLAM 2 MG: 1 INJECTION INTRAMUSCULAR; INTRAVENOUS at 15:51

## 2019-01-09 RX ADMIN — FENTANYL CITRATE 50 MCG: 50 INJECTION, SOLUTION INTRAMUSCULAR; INTRAVENOUS at 17:43

## 2019-01-09 RX ADMIN — PROPOFOL 350 MG: 10 INJECTION, EMULSION INTRAVENOUS at 15:59

## 2019-01-09 RX ADMIN — FENTANYL CITRATE 50 MCG: 50 INJECTION, SOLUTION INTRAMUSCULAR; INTRAVENOUS at 16:47

## 2019-01-09 RX ADMIN — SODIUM CHLORIDE, SODIUM LACTATE, POTASSIUM CHLORIDE, AND CALCIUM CHLORIDE 125 ML/HR: .6; .31; .03; .02 INJECTION, SOLUTION INTRAVENOUS at 08:40

## 2019-01-09 RX ADMIN — FENTANYL CITRATE 50 MCG: 50 INJECTION, SOLUTION INTRAMUSCULAR; INTRAVENOUS at 16:19

## 2019-01-09 RX ADMIN — FENTANYL CITRATE 100 MCG: 50 INJECTION, SOLUTION INTRAMUSCULAR; INTRAVENOUS at 15:59

## 2019-01-09 RX ADMIN — ROCURONIUM BROMIDE 50 MG: 10 INJECTION INTRAVENOUS at 15:59

## 2019-01-09 RX ADMIN — PROMETHAZINE HYDROCHLORIDE 12.5 MG: 25 INJECTION INTRAMUSCULAR; INTRAVENOUS at 17:54

## 2019-01-09 RX ADMIN — SERTRALINE HYDROCHLORIDE 25 MG: 25 TABLET ORAL at 08:40

## 2019-01-09 RX ADMIN — LIDOCAINE HYDROCHLORIDE 60 MG: 10 INJECTION, SOLUTION INFILTRATION; PERINEURAL at 15:59

## 2019-01-09 RX ADMIN — SUGAMMADEX 154 MG: 100 INJECTION, SOLUTION INTRAVENOUS at 17:09

## 2019-01-09 RX ADMIN — KETOROLAC TROMETHAMINE 30 MG: 30 INJECTION, SOLUTION INTRAMUSCULAR at 16:46

## 2019-01-09 RX ADMIN — NEOSTIGMINE METHYLSULFATE 3 MG: 1 INJECTION INTRAVENOUS at 16:46

## 2019-01-09 RX ADMIN — CEFAZOLIN SODIUM 1000 MG: 1 SOLUTION INTRAVENOUS at 15:53

## 2019-01-09 RX ADMIN — GLYCOPYRROLATE 0.4 MG: 0.2 INJECTION, SOLUTION INTRAMUSCULAR; INTRAVENOUS at 16:46

## 2019-01-09 RX ADMIN — FENTANYL CITRATE 50 MCG: 50 INJECTION, SOLUTION INTRAMUSCULAR; INTRAVENOUS at 17:35

## 2019-01-09 RX ADMIN — DEXTROSE AND SODIUM CHLORIDE 125 ML/HR: 5; .45 INJECTION, SOLUTION INTRAVENOUS at 18:15

## 2019-01-09 RX ADMIN — MIDAZOLAM 2 MG: 1 INJECTION INTRAMUSCULAR; INTRAVENOUS at 15:50

## 2019-01-09 RX ADMIN — ONDANSETRON 8 MG: 2 INJECTION INTRAMUSCULAR; INTRAVENOUS at 15:59

## 2019-01-09 RX ADMIN — DEXAMETHASONE SODIUM PHOSPHATE 8 MG: 10 INJECTION INTRAMUSCULAR; INTRAVENOUS at 15:59

## 2019-01-09 NOTE — H&P
H&P Exam - General Surgery   Fay Saunders 24 y o  male MRN: [id number]  Unit/Bed#: E5 -01 Encounter: [id number]    Assessment/Plan     Assessment/Plan:  Lower abdominal pain possible early appendicitis  -IVF  -Bowel rest  -plan for tentative diagnostic laparoscopy with appendectomy versus continued observation        History of Present Illness   CC:abdominal pain x 4 days  HPI:  Fay Saunders is a 24 y o  male who presents with lower abdominal pain that is crampy to sharp in nature x 4 days  Associated with alternating bowel habits of constipation and diarrhea  Denies fevers, chills, SOB, CP, loss of appetite or change in urinary habits  Patient with psychiatric history of anxiety and schizophrenia on Zoloft  Remote history of  STD  Review of Systems   Constitutional: Negative for appetite change, chills and fever  HENT: Negative for trouble swallowing and voice change  Eyes: Negative for photophobia and visual disturbance  Respiratory: Negative for apnea, chest tightness and shortness of breath  Cardiovascular: Negative for chest pain and leg swelling  Gastrointestinal: Positive for abdominal distention, abdominal pain, constipation and diarrhea  Negative for nausea and vomiting  Genitourinary: Negative for difficulty urinating  Musculoskeletal: Negative for arthralgias  Skin: Negative for color change  Neurological: Negative for dizziness and facial asymmetry  Hematological: Negative for adenopathy  Psychiatric/Behavioral: Negative for agitation         Historical Information   Past Medical History:   Diagnosis Date    Anxiety     Last assessed 10/18/16    Asthma     Last assessed 03/15/13    Intermittent explosive disorder     Oppositional defiant disorder     Schizophrenia ([facility] [address] )     last assessed 12/04/17    Scoliosis      Past Surgical History:   Procedure Laterality Date    HERNIA REPAIR      NO PAST SURGERIES       Social History   History   Alcohol Use No History   Drug Use No     Comment: Marijuana use     History   Smoking Status    Current Every Day Smoker    Packs/day: 0 50    Years: 4 00    Types: Cigarettes   Smokeless Tobacco    Never Used     Comment: Nicotine vapor use     Family History: non-contributory    Meds/Allergies   all medications and allergies reviewed  No Known Allergies    Objective   First Vitals:   Blood Pressure: (!) 160/108 (01/08/19 2035)  Pulse: (!) 118 (01/08/19 2035)  Temperature: 97 9 °F (36 6 °C) (01/08/19 2035)  Temp Source: Oral (01/08/19 2035)  Respirations: 16 (01/08/19 2035)  Height: 6' 2" (188 cm) (01/09/19 0011)  Weight - Scale: 79 4 kg (175 lb) (01/08/19 2035)  SpO2: 98 % (01/08/19 2035)    Current Vitals:   Blood Pressure: 139/64 (01/09/19 0900)  Pulse: 100 (01/09/19 0900)  Temperature: 98 5 °F (36 9 °C) (01/09/19 0900)  Temp Source: Temporal (01/09/19 0900)  Respirations: 18 (01/09/19 0003)  Height: 6' 2" (188 cm) (01/09/19 0011)  Weight - Scale: 77 kg (169 lb 12 1 oz) (01/09/19 0011)  SpO2: 98 % (01/09/19 0900)      Intake/Output Summary (Last 24 hours) at 01/09/19 1001  Last data filed at 01/08/19 2243   Gross per 24 hour   Intake             1000 ml   Output                0 ml   Net             1000 ml       Invasive Devices     Peripheral Intravenous Line            Peripheral IV 01/08/19 Right Antecubital less than 1 day                Physical Exam   Constitutional: He is oriented to person, place, and time  He appears well-developed and well-nourished  HENT:   Head: Normocephalic and atraumatic  Eyes: Conjunctivae are normal    Neck: Normal range of motion  Neck supple  Cardiovascular: Normal rate and regular rhythm  Pulmonary/Chest: Effort normal and breath sounds normal    Abdominal: Soft  Bowel sounds are normal  He exhibits no distension  There is tenderness (mild vaque lower abdominal pain)  Musculoskeletal: Normal range of motion     Neurological: He is alert and oriented to person, place, and time    Skin: Skin is warm  Psychiatric: His behavior is normal        Lab Results:   CBC:   Lab Results   Component Value Date    WBC 16 56 (H) 01/09/2019    HGB 16 1 01/09/2019    HCT 46 2 01/09/2019    MCV 90 01/09/2019     01/09/2019    MCH 31 4 01/09/2019    MCHC 34 8 01/09/2019    RDW 12 1 01/09/2019    MPV 10 4 01/09/2019    NRBC 0 01/09/2019   , CMP:   Lab Results   Component Value Date    SODIUM 142 01/09/2019    K 4 4 01/09/2019     01/09/2019    CO2 23 01/09/2019    BUN 6 01/09/2019    CREATININE 1 08 01/09/2019    CALCIUM 9 2 01/09/2019    AST 18 01/08/2019    ALT 28 01/08/2019    ALKPHOS 74 01/08/2019    EGFR 96 01/09/2019     Imaging: I have personally reviewed pertinent reports  EKG, Pathology, and Other Studies: I have personally reviewed pertinent reports        Code Status: No Order  Advance Directive and Living Will:      Power of :    POLST:      Counseling / Coordination of Care

## 2019-01-09 NOTE — OP NOTE
LAPAROSCOPY DIAGNOSTIC, APPENDECTOMY LAPAROSCOPIC  Postoperative Note  PATIENT NAME: Eloy Solorio  : 1995  MRN: [id number]  AL OR ROOM 06    Surgery Date: 2019    Pre operative diagnosis:  Lower abdominal pain [R10 30]    Operative Indications:  Acute Appendicitis       Consent:  The risks, benefits, and alternatives to the surgery were discussed with the patient and/or with the family prior to surgery, personally by Dr Tigre Disla  If the consent was obtained by the physician assistant or other representative, the consent was reviewed once again personally by the operating physician  Common complications particular for this procedure as well as unusual complications were discussed, including but not limited to:  bleeding, wound infection, prolonged wound healing, open wounds, reoperation, leak from the bowel or viscus, leak from the bile duct or injury to adjacent or other organs or blood vessels in the abdomen  If the surgery was laparoscopic, it was discussed that possible open surgery could also occur during that same surgery and is always an option in laparoscopic surgery and possible reoperation  A  was used if necessary  The patient expressed understanding of the issues discussed and wished and consented to the procedure to proceed  All questions were answered  Dr Tigre Disla personally discussed the informed consent with this patient  Operative Findings:   acute on chronic, retrocecal appendicitis  Post operative diagnosis:   Post-Op Diagnosis Codes:     * Lower abdominal pain [R10 30]    Procedure:   Procedure(s):  LAPAROSCOPY DIAGNOSTIC  APPENDECTOMY LAPAROSCOPIC    Surgeon(s) and Role:     * Abigial Kinney MD - Primary     * Doretha Rios PA-C - Assisting    The Physician Assistant was medically necessary for surgical safety the case including suturing, retraction, and hemostasis  No qualified resident was available  I was present for the entire procedure  Drains:  Urethral Catheter Latex 16 Fr  (Active)   Number of days: 0       Specimens:  ID Type Source Tests Collected by Time Destination   1 :  Tissue Appendix TISSUE EXAM Tawanda Chavez MD 1/9/2019 1629        Estimated Blood Loss:   10 mL    Anesthesia Type:   General     Procedure: The patient was seen again in the Holding Room  The risks, benefits, complications, treatment options, and expected outcomes were discussed with the patient and/or family  The possibilities of reaction to medication, pulmonary aspiration, perforation of viscus, bleeding, recurrent infection, finding a normal appendix, the need for additional procedures, failure to diagnose a condition, and creating a complication requiring transfusion or operation were discussed  There was concurrence with the proposed plan and informed consent was obtained  The site of surgery was properly noted/marked  The patient was taken to Operating Room, identified as Josie Williamsonady and the procedure verified as Appendectomy  A Time Out was held after the prep and draping,  and the above information confirmed  The patient was placed in the supine position and general anesthesia was induced, along with placement of orogastric tube, Venodyne boots, and a Westfall catheter  The abdomen was prepped and draped in a sterile fashion  An infraumbilical incision was made and an open technique was used to enter the abdomen  A port was placed and a pneumoperitoneum created  Additional ports were placed under direct vision as needed  A careful evaluation of the entire abdomen was carried out  The patient was placed in Trendelenburg and left lateral decubitus position  The small intestines were retracted in the cephalad and left lateral direction away from the pelvis and right lower quadrant  There was visualized an  inflamed appendix that was extending into the pelvis  There was no evidence of perforation    However the appendix was chronically inflamed and curled back in a severe retrocecal position  There was scarring and fibrosis in the area  The appendix was carefully dissected  A window was made in the mesoappendix at the base of the appendix  Harmonic scapel and cautery were used to take the mesoappendix  The appendix was divided at its base using an endo-JOHNATHON stapler, at the level of the cecum  There was no evidence of bleeding, leakage, or complication after division of the appendix  Irrigation was also performed and irrigate suctioned from the abdomen as well  The larger port site fascia was closed using a non-or minimally absorbable suture  All skin incisions were closed using MonocryI suture  The instrument, sponge, and needle counts were correct at the conclusion of the case  Some portions of this record may have been generated with voice recognition software  There may be translation, syntax,  or grammatical errors  Occasional wrong word or "sound-a-like" substitutions may have occurred due to the inherent limitations of the voice recognition software  Read the chart carefully and recognize, using context, where substations may have occurred  If you have any questions, please contact the dictating provider for clarification or correction, as needed       Complications: None    Condition: Stable to PACU    SIGNATURE: Alisa Wang MD   DATE: January 9, 2019   TIME: 4:56 PM

## 2019-01-09 NOTE — PROGRESS NOTES
Patient received post op vitals stable 3 abdomen sites intact dressings  Vitals stable pain controlled call bell in reach  Surgical soft diet ordered

## 2019-01-09 NOTE — UTILIZATION REVIEW
Initial Clinical Review    Admission: Date/Time/Statement:     OBS  ORDER  1/8  @   2305    01/10/19 0411  Inpatient Admission Once     Transfer Service: Surgery-General       Question Answer Comment   Admitting Physician Vinay Lanza    Level of Care Med Surg    Estimated length of stay More than 2 Midnights    Certification I certify that inpatient services are medically necessary for this patient for a duration of greater than two midnights  See H&P and MD Progress Notes for additional information about the patient's course of treatment  01/10/19 0411       ED: Date/Time/Mode of Arrival:   ED Arrival Information     Expected Arrival Acuity Means of Arrival Escorted By Service Admission Type    - 1/8/2019 20:27 Urgent Walk-In Self Surgery-General Urgent    Arrival Complaint    abdominal pain        Chief Complaint:   Chief Complaint   Patient presents with    Abdominal Pain     lower abdominal pain with diarrhea and intermittent nausea and vomiting  patient reports symptoms started over the summer  History of Illness: CC:abdominal pain x 4 days  HPI:  Vanna Dee is a 24 y o  male who presents with lower abdominal pain that is crampy to sharp in nature x 4 days  Associated with alternating bowel habits of constipation and diarrhea  Denies fevers, chills, SOB, CP, loss of appetite or change in urinary habits  Patient with psychiatric history of anxiety and schizophrenia on Zoloft  Remote history of  STD       ED Vital Signs:   ED Triage Vitals   Temperature Pulse Respirations Blood Pressure SpO2   01/08/19 2035 01/08/19 2035 01/08/19 2035 01/08/19 2035 01/08/19 2035   97 9 °F (36 6 °C) (!) 118 16 (!) 160/108 98 %      Temp Source Heart Rate Source Patient Position - Orthostatic VS BP Location FiO2 (%)   01/08/19 2035 01/08/19 2238 01/08/19 2035 01/08/19 2035 --   Oral Monitor Sitting Right arm       Pain Score       01/08/19 2238       No Pain        Wt Readings from Last 1 Encounters: 01/09/19 77 kg (169 lb 12 1 oz)     Vital Signs (abnormal): Above    Pertinent Labs/Diagnostic Test Results:    K  3 0  WBC   22 42  Abs neutro    19 67  Ct  Abd/pelvis:     Questionable/possible mild early acute appendicitis  Follow-up CT recommended in 24-48 hours  ED Treatment:   Medication Administration from 01/08/2019 2027 to 01/08/2019 2359       Date/Time Order Dose Route Action Action by Comments     01/08/2019 2243 sodium chloride 0 9 % bolus 1,000 mL 0 mL Intravenous Stopped Anastasiya Alexander RN      01/08/2019 2139 sodium chloride 0 9 % bolus 1,000 mL 1,000 mL Intravenous [address] Anastasiya Alexander, [address] [facility]      01/08/2019 2159 iohexol (OMNIPAQUE) 350 MG/ML injection (MULTI-DOSE) 100 mL 100 mL Intravenous Given Maty Fisher      01/08/2019 2233 potassium chloride (K-DUR,KLOR-CON) CR tablet 40 mEq 40 mEq Oral Given Anastasiya Alexander RN      01/08/2019 2332 ketorolac (TORADOL) injection 15 mg 15 mg Intravenous Given Anastasiya Alexander RN         Past Medical/Surgical History:    Active Ambulatory Problems     Diagnosis Date Noted    Attention deficit hyperactivity disorder 10/18/2016    Intellectual disability 07/02/2014    Psychosis, bipolar affective ([facility] [address] [facility] 07/02/2014    Schizophrenia ([facility] [address] [facility] 07/02/2014    Scoliosis 07/02/2014    Tourette's syndrome 06/20/2013    Closed fracture of nasal bone 04/24/2018    Chronic midline low back pain without sciatica 04/24/2018    Hypokalemia 08/28/2018    Disorder of penis 08/28/2018    Medicare annual wellness visit, subsequent 12/03/2018    Encounter for hepatitis C virus screening test for high risk patient 12/06/2018    Tattoos 12/06/2018     Resolved Ambulatory Problems     Diagnosis Date Noted    No Resolved Ambulatory Problems     Past Medical History:   Diagnosis Date    Anxiety     Asthma     Intermittent explosive disorder     Oppositional defiant disorder     Sch[facility]ia [facility] [address] [facility]     Scoliosis      Admitting Diagnosis: Hypokalemia [E87 6]  Schizophrenia ([facility] [address] [facility] [F20 9]  Abnormal CT of the abdomen [R93 5]  Intellectual disability [F79]  Unspecified abdominal pain [R10 9]  Abdominal pain, unspecified abdominal location [R10 9]  Leukocytosis, unspecified type [D72 829]  Age/Sex: 24 y o  male     Assessment/Plan: Lower abdominal pain possible early appendicitis  -IVF  -Bowel rest  -plan for tentative diagnostic laparoscopy with appendectomy versus continued observation     Admission Orders:  OBS  ORDER    1/8  @   2305    IP ORDER   Entered  1/10  @  0411  Scheduled Meds:   Current Facility-Administered Medications:  nicotine 1 patch Transdermal Daily Jennifer Posey MD   sertraline 25 mg Oral Daily Jennifer Posey MD     Continuous Infusions:    PRN Meds:

## 2019-01-09 NOTE — DISCHARGE INSTRUCTIONS
[facility] Instructions  Dr Gilda Morelos MD, FACS    1  General: You will feel pulling sensations around the wound or funny aches and pains around the incisions  This is normal  Even minor surgery is a change in your body and this is your bodys way of reaction to it  If you have had abdominal surgery, it may help to support the incision with a small pillow or blanket for comfort when moving or coughing  2  Wound care: Make sure to remove the bandage in about 24 hours, unless instructed otherwise  You usually don't have to redress the wound after 24-48 hours, unless for comfort  Keep the incision clean and dry  Let air get to it  If this Steri-Strips fall off, just keep the wound clean  3  Water: You may shower over the wound, unless there are drain tubes left in place  Do not bathe or use a pool or hot tub until cleared by the physician  You may shower right over the staples or Steri-Strips and packing dry when you are done  4  Activity: You may go up and down stairs, walk as much as you are comfortable, but walk at least 3 times each day  If you have had abdominal surgery, do not lift anything heavier than 15 pounds for at least 2-4 weeks, unless cleared by the doctor  5  Diet: You may resume a regular diet  If you had a same-day surgery or overnight stay surgery, you may wish to eat lightly for a few days: soups, crackers, and sandwiches  You may resume a regular diet when ready  6  Medications: Resume all of your previous medications, unless told otherwise by the doctor  Avoid aspirin or ibuprofen (Advil, Motrin, etc ) products for 2-3 days after the date of surgery  You may, at that time, began to take them again  Tylenol is always fine, unless you are taking any narcotic pain medication containing Tylenol (such as Percocet, Darvocet, Vicodin, or anything containing acetaminophen)  Do not take Tylenol if you're taking these medications   You do not need to take the narcotic pain medications unless you are having significant pain and discomfort  7  Driving: You will need someone to drive you home on the day of surgery  Do not drive or make any important decisions while on narcotic pain medication or 24 hours and after anesthesia or sedation for surgery  Generally, you may drive when your off all narcotic pain medications  8  Upset Stomach: You may take Maalox, Tums, or similar items for an upset stomach  If your narcotic pain medication causes an upset stomach, do not take it on an empty stomach  Try taking it with at least some crackers or toast      9  Constipation: Patients often experienced constipation after surgery  You may take over-the-counter medication for this, such as Metamucil, Senokot, Dulcolax, milk of magnesia, etc  You may take a suppository unless you have had anorectal surgery such as a procedure on your hemorrhoids  If you experience significant nausea or vomiting after abdominal surgery, call the office before trying any of these medications  10  Call the office: If you are experiencing any of the following, fevers above 101 5°, significant nausea or vomiting, if the wound develops drainage and/or is excessive redness around the wound, or if you have significant diarrhea or other worsening symptoms  11  Pain: You may be given a prescription for pain  This will be given to the hospital, the day of surgery  12  Sexual Activity: You may resume sexual activity when you feel ready and comfortable and your incision is sealed and healed without apparent infection risk  13  Urination: If you haven't urinated in 6 hours, go directly to the ER for evaluation for urinary retention       [address], [address]  [place], [address]                                                                                              Phone: [phone]

## 2019-01-09 NOTE — ED NOTES
Patient transported to [facility], [address] [place] [address]  01/08/19 3523

## 2019-01-09 NOTE — ANESTHESIA POSTPROCEDURE EVALUATION
Post-Op Assessment Note      CV Status:  Stable    Mental Status:  Alert and awake    Hydration Status:  Euvolemic    PONV Controlled:  Controlled    Airway Patency:  Patent  Airway: intubated    Post Op Vitals Reviewed: Yes          Staff: Anesthesiologist           /93 (01/09/19 1801)    Temp      Pulse 100 (01/09/19 1801)   Resp 22 (01/09/19 1801)    SpO2 99 % (01/09/19 1801)

## 2019-01-09 NOTE — ANESTHESIA PREPROCEDURE EVALUATION
Review of Systems/Medical History          Cardiovascular   Pulmonary  Smoker cigarette smoker  , Asthma ,        GI/Hepatic            Endo/Other     GYN       Hematology   Musculoskeletal       Neurology   Psychology   Psychiatric history, Anxiety, Schizophrenia             Physical Exam    Airway    Mallampati score: III  TM Distance: >3 FB  Neck ROM: full     Dental   No notable dental hx     Cardiovascular  Rhythm: regular, Rate: normal, Cardiovascular exam normal    Pulmonary  Pulmonary exam normal Breath sounds clear to auscultation,     Other Findings        Anesthesia Plan  ASA Score- 2 Emergent    Anesthesia Type- general with ASA Monitors  Additional Monitors:   Airway Plan: ETT  Comment: RSI  Plan Factors- Patient instructed to abstain from smoking on day of procedure  Patient did not smoke on day of surgery  Induction- intravenous  Postoperative Plan- Plan for postoperative opioid use  Informed Consent- Anesthetic plan and risks discussed with patient

## 2019-01-10 LAB
ALBUMIN SERPL BCP-MCNC: 3.6 G/DL (ref 3.5–5)
ALP SERPL-CCNC: 51 U/L (ref 46–116)
ALT SERPL W P-5'-P-CCNC: 19 U/L (ref 12–78)
ANION GAP SERPL CALCULATED.3IONS-SCNC: 10 MMOL/L (ref 4–13)
AST SERPL W P-5'-P-CCNC: 11 U/L (ref 5–45)
BASOPHILS # BLD AUTO: 0.01 THOUSANDS/ΜL (ref 0–0.1)
BASOPHILS NFR BLD AUTO: 0 % (ref 0–1)
BILIRUB SERPL-MCNC: 1.25 MG/DL (ref 0.2–1)
BUN SERPL-MCNC: 11 MG/DL (ref 5–25)
CALCIUM SERPL-MCNC: 8.6 MG/DL (ref 8.3–10.1)
CHLORIDE SERPL-SCNC: 103 MMOL/L (ref 100–108)
CO2 SERPL-SCNC: 25 MMOL/L (ref 21–32)
CREAT SERPL-MCNC: 0.97 MG/DL (ref 0.6–1.3)
EOSINOPHIL # BLD AUTO: 0 THOUSAND/ΜL (ref 0–0.61)
EOSINOPHIL NFR BLD AUTO: 0 % (ref 0–6)
ERYTHROCYTE [DISTWIDTH] IN BLOOD BY AUTOMATED COUNT: 12.1 % (ref 11.6–15.1)
GFR SERPL CREATININE-BSD FRML MDRD: 109 ML/MIN/1.73SQ M
GLUCOSE SERPL-MCNC: 136 MG/DL (ref 65–140)
HCT VFR BLD AUTO: 42.1 % (ref 36.5–49.3)
HGB BLD-MCNC: 14.7 G/DL (ref 12–17)
IMM GRANULOCYTES # BLD AUTO: 0.11 THOUSAND/UL (ref 0–0.2)
IMM GRANULOCYTES NFR BLD AUTO: 1 % (ref 0–2)
LYMPHOCYTES # BLD AUTO: 1.18 THOUSANDS/ΜL (ref 0.6–4.47)
LYMPHOCYTES NFR BLD AUTO: 7 % (ref 14–44)
MCH RBC QN AUTO: 31.9 PG (ref 26.8–34.3)
MCHC RBC AUTO-ENTMCNC: 34.9 G/DL (ref 31.4–37.4)
MCV RBC AUTO: 91 FL (ref 82–98)
MONOCYTES # BLD AUTO: 0.79 THOUSAND/ΜL (ref 0.17–1.22)
MONOCYTES NFR BLD AUTO: 5 % (ref 4–12)
NEUTROPHILS # BLD AUTO: 15.28 THOUSANDS/ΜL (ref 1.85–7.62)
NEUTS SEG NFR BLD AUTO: 87 % (ref 43–75)
NRBC BLD AUTO-RTO: 0 /100 WBCS
PLATELET # BLD AUTO: 193 THOUSANDS/UL (ref 149–390)
PMV BLD AUTO: 10.6 FL (ref 8.9–12.7)
POTASSIUM SERPL-SCNC: 4.2 MMOL/L (ref 3.5–5.3)
PROT SERPL-MCNC: 6.4 G/DL (ref 6.4–8.2)
RBC # BLD AUTO: 4.61 MILLION/UL (ref 3.88–5.62)
SODIUM SERPL-SCNC: 138 MMOL/L (ref 136–145)
WBC # BLD AUTO: 17.37 THOUSAND/UL (ref 4.31–10.16)

## 2019-01-10 PROCEDURE — 99024 POSTOP FOLLOW-UP VISIT: CPT | Performed by: SURGERY

## 2019-01-10 PROCEDURE — 80053 COMPREHEN METABOLIC PANEL: CPT | Performed by: PHYSICIAN ASSISTANT

## 2019-01-10 PROCEDURE — 85025 COMPLETE CBC W/AUTO DIFF WBC: CPT | Performed by: PHYSICIAN ASSISTANT

## 2019-01-10 RX ORDER — HYDROCODONE BITARTRATE AND ACETAMINOPHEN 5; 325 MG/1; MG/1
1 TABLET ORAL EVERY 4 HOURS PRN
Qty: 15 TABLET | Refills: 0 | Status: SHIPPED | OUTPATIENT
Start: 2019-01-10 | End: 2019-02-13

## 2019-01-10 RX ORDER — DOCUSATE SODIUM 100 MG/1
100 CAPSULE, LIQUID FILLED ORAL 2 TIMES DAILY
Status: DISCONTINUED | OUTPATIENT
Start: 2019-01-10 | End: 2019-01-11 | Stop reason: HOSPADM

## 2019-01-10 RX ORDER — DOCUSATE SODIUM 100 MG/1
100 CAPSULE, LIQUID FILLED ORAL 2 TIMES DAILY
Qty: 60 CAPSULE | Refills: 0 | Status: SHIPPED | OUTPATIENT
Start: 2019-01-10 | End: 2019-02-10

## 2019-01-10 RX ADMIN — HEPARIN SODIUM 5000 UNITS: 5000 INJECTION INTRAVENOUS; SUBCUTANEOUS at 08:59

## 2019-01-10 RX ADMIN — DOCUSATE SODIUM 100 MG: 100 CAPSULE, LIQUID FILLED ORAL at 10:22

## 2019-01-10 RX ADMIN — SERTRALINE HYDROCHLORIDE 25 MG: 25 TABLET ORAL at 08:59

## 2019-01-10 RX ADMIN — DOCUSATE SODIUM 100 MG: 100 CAPSULE, LIQUID FILLED ORAL at 17:49

## 2019-01-10 RX ADMIN — DEXTROSE AND SODIUM CHLORIDE 125 ML/HR: 5; .45 INJECTION, SOLUTION INTRAVENOUS at 06:34

## 2019-01-10 RX ADMIN — HEPARIN SODIUM 5000 UNITS: 5000 INJECTION INTRAVENOUS; SUBCUTANEOUS at 14:41

## 2019-01-10 NOTE — PHYSICIAN ADVISOR
Current patient class: Observation  The patient is currently on Hospital Day: 3 at [facility]      The patient was admitted to the hospital at N/A on N/A for the following diagnosis:  Hypokalemia [E87 6]  Schizophrenia ([address] ) [F20 9]  Abnormal CT of the abdomen [R93 5]  Intellectual disability [F79]  Unspecified abdominal pain [R10 9]  Abdominal pain, unspecified abdominal location [R10 9]  Leukocytosis, unspecified type [D72 829]       There is documentation in the medical record of an expected length of stay of at least 2 midnights  The patient is therefore expected to satisfy the 2 midnight benchmark and given the 2 midnight presumption is appropriate for INPATIENT ADMISSION  Given this expectation of a satisfying stay, CMS instructs us that the patient is most often appropriate for inpatient admission under part A provided medical necessity is documented in the chart  After review of the relevant documentation, labs, vital signs and test results, the patient is appropriate for INPATIENT ADMISSION  Admission to the hospital as an inpatient is a complex decision making process which requires the practitioner to consider the patients presenting complaint, history and physical examination and all relevant testing  With this in mind, in this case, the patient was deemed appropriate for INPATIENT ADMISSION  After review of the documentation and testing available at the time of the admission I concur with this clinical determination of medical necessity  Rationale is as follows: The patient is a 24 yrs old Male who presented to the ED at 1/8/2019  8:36 PM with a chief complaint of Abdominal Pain (lower abdominal pain with diarrhea and intermittent nausea and vomiting    patient reports symptoms started over the summer  )     Given the need for further hospitalization, and along with the documentation of medical necessity present in the chart, the patient is appropriate for inpatient admission  The patient is expected to satisfy the 2 midnight benchmark, and will require further acute medical care  The patient does have comorbid conditions which increases the risk for significant adverse outcome  Given this the patient is appropriate for inpatient admission        The patients vitals on arrival were ED Triage Vitals   Temperature Pulse Respirations Blood Pressure SpO2   01/08/19 2035 01/08/19 2035 01/08/19 2035 01/08/19 2035 01/08/19 2035   97 9 °F (36 6 °C) (!) 118 16 (!) 160/108 98 %      Temp Source Heart Rate Source Patient Position - Orthostatic VS BP Location FiO2 (%)   01/08/19 2035 01/08/19 2238 01/08/19 2035 01/08/19 2035 --   Oral Monitor Sitting Right arm       Pain Score       01/08/19 2238       No Pain           Past Medical History:   Diagnosis Date    Anxiety     Last assessed 10/18/16    Asthma     Last assessed 03/15/13    Intermittent explosive disorder     Oppositional defiant disorder     Schizophrenia ([facility] )     last assessed 12/04/17    Scoliosis      Past Surgical History:   Procedure Laterality Date    HERNIA REPAIR      NO PAST SURGERIES             Consults have been placed to:   IP CONSULT TO CASE MANAGEMENT    Vitals:    01/09/19 1842 01/09/19 1929 01/09/19 2109 01/09/19 2247   BP: 140/67 138/62 137/64 117/55   BP Location: Left arm Left arm Left arm Left arm   Pulse: 88 92 84 94   Resp: 16 16 16 20   Temp: 97 8 °F (36 6 °C) 98 5 °F (36 9 °C) 98 4 °F (36 9 °C) 98 5 °F (36 9 °C)   TempSrc: Tympanic Temporal Temporal Temporal   SpO2: 97% 96% 96% 97%   Weight:       Height:           Most recent labs:    Recent Labs      01/08/19 2104 01/09/19   0447   WBC  22 42*  16 56*   HGB  17 9*  16 1   HCT  49 3  46 2   PLT  246  218   K  3 0*  4 4   CALCIUM  10 0  9 2   BUN  5  6   CREATININE  1 14  1 08   LIPASE  179   --    AST  18   --    ALT  28   --    ALKPHOS  74   --        Scheduled Meds:  Current Facility-Administered Medications:  acetaminophen 650 mg Oral Q6H PRN Valentín Crawford, PA-C    dextrose 5 % and sodium chloride 0 45 % 125 mL/hr Intravenous Continuous Valentín Crawford, PA-C Last Rate: 125 mL/hr (01/09/19 1815)   heparin (porcine) 5,000 Units Subcutaneous [facility] Valentín Yvette, PA-C    HYDROcodone-acetaminophen 1 tablet Oral Q4H PRN Valentín Crawford, PA-C    HYDROmorphone 0 5 mg Intravenous Q3H PRN Valentín Crawford, PA-C    morphine injection 2 mg Intravenous Q2H PRN Valentín Yvette, PA-C    nicotine 1 patch Transdermal Daily Jozef Cleveland MD    ondansetron 4 mg Intravenous Q4H PRN Valentín Crawford PA-C    sertraline 25 mg Oral Daily Jozef Cleveland MD      Continuous Infusions:  dextrose 5 % and sodium chloride 0 45 % 125 mL/hr Last Rate: 125 mL/hr (01/09/19 1815)     PRN Meds:   acetaminophen    HYDROcodone-acetaminophen    HYDROmorphone    morphine injection    ondansetron    Surgical procedures (if appropriate):  Procedure(s):  LAPAROSCOPY DIAGNOSTIC  APPENDECTOMY LAPAROSCOPIC

## 2019-01-10 NOTE — PLAN OF CARE
Problem: DISCHARGE PLANNING - CARE MANAGEMENT  Goal: Discharge to post-acute care or home with appropriate resources  INTERVENTIONS:  - Conduct assessment to determine patient/family and health care team treatment goals, and need for post-acute services based on payer coverage, community resources, and patient preferences, and barriers to discharge  - Address psychosocial, clinical, and financial barriers to discharge as identified in assessment in conjunction with the patient/family and health care team  - Arrange appropriate level of post-acute services according to patients   needs and preference and payer coverage in collaboration with the physician and health care team  - Communicate with and update the patient/family, physician, and health care team regarding progress on the discharge plan  - Arrange appropriate transportation to post-acute venues  Outcome: Adequate for Discharge  -Pt cleared for discharge to home today and has no needs  Pt has an ICM who pt stated is his support in the community and assists pt  She will be in to transport pt home

## 2019-01-10 NOTE — SOCIAL WORK
Met with pt who provided information for initial assessment  Pt stated he lives alone in an apt  Prior to admission, pt independent with ambulation and ADLs  No hx of VNA  Pt has a dx of Schizophrenia and Bipolar disorder  Pt stated he has an ICM through [employer]  Her name is Jose Antonio Cline  Contact information on facesheet [phone]  Pt stated she is the one who assists pt in the community and helps transport to medical appointments if needed  PCP- Brooklynn Rivera PA-C  Pharmacy- [address] [place] [address] on [address] [facility]  Pt is medically cleared today and his ICM will be able to transport pt home  Pt has no discharge needs

## 2019-01-10 NOTE — PROGRESS NOTES
Progress Note - General Surgery   Chantelle Milligan 24 y o  male MRN: [id number]    Assessment & Plan:   1  1 Day Post-Op status post    Procedure(s):  LAPAROSCOPY DIAGNOSTIC  APPENDECTOMY LAPAROSCOPIC   by Bismark Bennett MD on 1/9/2019      23-year-old male with a acute on chronic appendicitis status post laparoscopic appendectomy  Will plan to discharge patient home with pain medications and stool softeners follow-up in the office in 2 weeks  Encouraged ambulation and incentive spirometry    2  Advance to Regular house diet     3  Incentive Spirometry, Encourage Ambulation    4  VTE Prophylaxis  - Pharmacologic: Heparin  - Mechanical: sequential compression device    5   Discharge Planning: home today    Subjective:  - Pain: has  - Current diet :Regular house diet   - no nausea and no vomiting  - + Flatus and no BM  - Using incentive spirometer and Incentive spirometer within reach  - Laying in bed   -     Objective:    Vitals:   Vitals:    01/09/19 1842 01/09/19 1929 01/09/19 2109 01/09/19 2247   BP: 140/67 138/62 137/64 117/55   BP Location: Left arm Left arm Left arm Left arm   Pulse: 88 92 84 94   Resp: 16 16 16 20   Temp: 97 8 °F (36 6 °C) 98 5 °F (36 9 °C) 98 4 °F (36 9 °C) 98 5 °F (36 9 °C)   TempSrc: Tympanic Temporal Temporal Temporal   SpO2: 97% 96% 96% 97%   Weight:       Height:           I/O:   I/O       01/08 0701 - 01/09 0700 01/09 0701 - 01/10 0700 01/10 0701 - 01/11 0700    I V  (mL/kg)  1300 (16 9)     IV Piggyback 1000      Total Intake(mL/kg) 1000 (13) 1300 (16 9)     Urine (mL/kg/hr)  1300 (0 7)     Blood  10     Total Output   1310      Net +1000 -10                   Labs:  Lab Results   Component Value Date    WBC 17 37 (H) 01/10/2019    HGB 14 7 01/10/2019    HCT 42 1 01/10/2019    MCV 91 01/10/2019     01/10/2019       Lab Results   Component Value Date    CALCIUM 8 6 01/10/2019    K 4 2 01/10/2019    CO2 25 01/10/2019     01/10/2019    BUN 11 01/10/2019    CREATININE 0 97 01/10/2019         Radiology: No new pertinent imaging studies  Exam:  General: alert, appears stated age and no distress  Chest: Normal chest wall and respirations  Clear to auscultation  Heart[de-identified] regular rate and rhythm, S1, S2 normal, no murmur, click, rub or gallop  Abdomen: abdomen is soft without significant tenderness, masses, organomegaly or guarding Bowel sounds are normal   Incision: healing well, no drainage, no erythema, no seroma, no swelling  Extremities: peripheral pulses normal, no pedal edema, no clubbing or cyanosis          Jacques Shah PA-C      This report has been generated by a voice recognition software system  Therefore, there may be syntax, spelling, and/or grammatical errors  Please call if you've any questions

## 2019-01-10 NOTE — PROGRESS NOTES
Patient ambulate no problems  Anxiety support given patient accidentally removed iv out ok to leave out per René GILBERT  3 abdomen incision intact  Appetite fair vitals stable  Nurse call bell in reach

## 2019-01-11 ENCOUNTER — TELEPHONE (OUTPATIENT)
Dept: SURGERY | Facility: CLINIC | Age: 24
End: 2019-01-11

## 2019-01-11 VITALS
TEMPERATURE: 97.6 F | HEART RATE: 60 BPM | BODY MASS INDEX: 21.79 KG/M2 | HEIGHT: 74 IN | OXYGEN SATURATION: 98 % | DIASTOLIC BLOOD PRESSURE: 66 MMHG | WEIGHT: 169.75 LBS | RESPIRATION RATE: 18 BRPM | SYSTOLIC BLOOD PRESSURE: 137 MMHG

## 2019-01-11 LAB
BASOPHILS # BLD AUTO: 0.02 THOUSANDS/ΜL (ref 0–0.1)
BASOPHILS NFR BLD AUTO: 0 % (ref 0–1)
EOSINOPHIL # BLD AUTO: 0.04 THOUSAND/ΜL (ref 0–0.61)
EOSINOPHIL NFR BLD AUTO: 0 % (ref 0–6)
ERYTHROCYTE [DISTWIDTH] IN BLOOD BY AUTOMATED COUNT: 12.2 % (ref 11.6–15.1)
HCT VFR BLD AUTO: 41.4 % (ref 36.5–49.3)
HGB BLD-MCNC: 14.5 G/DL (ref 12–17)
IMM GRANULOCYTES # BLD AUTO: 0.02 THOUSAND/UL (ref 0–0.2)
IMM GRANULOCYTES NFR BLD AUTO: 0 % (ref 0–2)
LYMPHOCYTES # BLD AUTO: 3.82 THOUSANDS/ΜL (ref 0.6–4.47)
LYMPHOCYTES NFR BLD AUTO: 39 % (ref 14–44)
MCH RBC QN AUTO: 32.4 PG (ref 26.8–34.3)
MCHC RBC AUTO-ENTMCNC: 35 G/DL (ref 31.4–37.4)
MCV RBC AUTO: 92 FL (ref 82–98)
MONOCYTES # BLD AUTO: 0.79 THOUSAND/ΜL (ref 0.17–1.22)
MONOCYTES NFR BLD AUTO: 8 % (ref 4–12)
NEUTROPHILS # BLD AUTO: 5.06 THOUSANDS/ΜL (ref 1.85–7.62)
NEUTS SEG NFR BLD AUTO: 53 % (ref 43–75)
NRBC BLD AUTO-RTO: 0 /100 WBCS
PLATELET # BLD AUTO: 165 THOUSANDS/UL (ref 149–390)
PMV BLD AUTO: 10.5 FL (ref 8.9–12.7)
RBC # BLD AUTO: 4.48 MILLION/UL (ref 3.88–5.62)
WBC # BLD AUTO: 9.75 THOUSAND/UL (ref 4.31–10.16)

## 2019-01-11 PROCEDURE — 85025 COMPLETE CBC W/AUTO DIFF WBC: CPT | Performed by: PHYSICIAN ASSISTANT

## 2019-01-11 PROCEDURE — 99024 POSTOP FOLLOW-UP VISIT: CPT | Performed by: PHYSICIAN ASSISTANT

## 2019-01-11 RX ADMIN — SERTRALINE HYDROCHLORIDE 25 MG: 25 TABLET ORAL at 09:05

## 2019-01-11 RX ADMIN — DOCUSATE SODIUM 100 MG: 100 CAPSULE, LIQUID FILLED ORAL at 09:05

## 2019-01-11 NOTE — NURSING NOTE
All d/c instructions were reviewed with the patient  Scripts were provided as well  All questions answered  All belongings were taken  Patient left with his mental health  and she will drive him home  PCA ambulated with patient off unit in stable condition

## 2019-01-11 NOTE — TELEPHONE ENCOUNTER
Called and spoke to patient  He states he just got home from hospital  Doing ok  He is aware I will call him Monday to check on him  He is aware if he has any problems over the weekend, he should report to the ED

## 2019-01-11 NOTE — PROGRESS NOTES
Progress Note - General Surgery   Venkata Coleman 24 y o  male MRN: [id number]    Assessment & Plan:   1  2 Days Post-Op status post    Procedure(s):  LAPAROSCOPY DIAGNOSTIC  APPENDECTOMY LAPAROSCOPIC   by Damián Burns MD on 1/9/2019      57-year-old with sepsis on admission with leukocytosis and tachycardia with acute on chronic appendicitis, postop day 2  Status post lap appy    Discharge home  Leukocytosis has resolved  Bowel regimen at home to assist with constipation    2  Advance to Surgical soft    3  Incentive Spirometry, Encourage Ambulation    4  VTE Prophylaxis  - Pharmacologic: Heparin  - Mechanical: sequential compression device    5  Discharge Planning: today    Subjective:  - Pain: denies  - Current diet :Surgical soft  - no nausea and no vomiting  - + Flatus and no BM  - Using incentive spirometer and Incentive spirometer within reach  - Laying in bed   -     Objective:    Vitals:   Vitals:    01/10/19 0700 01/10/19 1535 01/10/19 2326 01/11/19 0829   BP: 135/79 129/69 123/71 137/66   BP Location: Left arm Left arm Right arm Left arm   Pulse: 85 94 73 60   Resp: 18 18 18 18   Temp: 97 9 °F (36 6 °C) 98 °F (36 7 °C) 98 1 °F (36 7 °C) 97 6 °F (36 4 °C)   TempSrc: Temporal Temporal Temporal Temporal   SpO2: 96% 99% 98% 98%   Weight:       Height:           I/O:   I/O       01/09 0701 - 01/10 0700 01/10 0701 - 01/11 0700 01/11 0701 - 01/12 0700    P  O  480 600     I V  (mL/kg) 1300 (16 9)      Total Intake(mL/kg) 1780 (23 1) 600 (7 8)     Urine (mL/kg/hr) 1300 (0 7) 1400 (0 8)     Blood 10      Total Output 1310 1400      Net +470 -800             Unmeasured Urine Occurrence  1 x           Labs:  Lab Results   Component Value Date    WBC 9 75 01/11/2019    HGB 14 5 01/11/2019    HCT 41 4 01/11/2019    MCV 92 01/11/2019     01/11/2019       Lab Results   Component Value Date    CALCIUM 8 6 01/10/2019    K 4 2 01/10/2019    CO2 25 01/10/2019     01/10/2019    BUN 11 01/10/2019    CREATININE 0 97 01/10/2019         Radiology: No new pertinent imaging studies  Exam:  General: alert, appears stated age and no distress  Chest: Normal chest wall and respirations  Clear to auscultation  Heart[de-identified] regular rate and rhythm, S1, S2 normal, no murmur, click, rub or gallop  Abdomen: abdomen is soft without significant tenderness, masses, organomegaly or guarding Bowel sounds are normal   Incision: healing well, no drainage, no erythema, no seroma, no swelling  Extremities: peripheral pulses normal, no pedal edema, no clubbing or cyanosis          Marisa Cancino PA-C      This report has been generated by a voice recognition software system  Therefore, there may be syntax, spelling, and/or grammatical errors  Please call if you've any questions

## 2019-01-14 LAB
BACTERIA BLD CULT: NORMAL
BACTERIA BLD CULT: NORMAL

## 2019-01-14 NOTE — TELEPHONE ENCOUNTER
Received return call from patient  Patient states that he's feeling "okay"  No F/V/N/C  He states that he's had several regular BM and several very loose BM  I told him to monitor his activity and to return a call to the office if necessary to help with getting him regulated  Patient was given pathology results and they were understood  Patient had already been given a post-op appointment for 1/22/19  Confirmed appt and time along with location of office  Patient will call the office if needed

## 2019-01-22 ENCOUNTER — OFFICE VISIT (OUTPATIENT)
Dept: SURGERY | Facility: CLINIC | Age: 24
End: 2019-01-22

## 2019-01-22 VITALS
RESPIRATION RATE: 18 BRPM | TEMPERATURE: 98.2 F | HEART RATE: 84 BPM | DIASTOLIC BLOOD PRESSURE: 82 MMHG | BODY MASS INDEX: 22.2 KG/M2 | HEIGHT: 74 IN | SYSTOLIC BLOOD PRESSURE: 138 MMHG | WEIGHT: 173 LBS

## 2019-01-22 DIAGNOSIS — K37 APPENDICITIS, UNSPECIFIED APPENDICITIS TYPE: Primary | ICD-10-CM

## 2019-01-22 PROCEDURE — 99024 POSTOP FOLLOW-UP VISIT: CPT | Performed by: PHYSICIAN ASSISTANT

## 2019-01-22 NOTE — LETTER
January 22, 2019     Kristie Buchanan PA-C  [address] [place]  [address]    Patient: Vanna Dee   YOB: 1995   Date of Visit: 1/22/2019       Dear Dr Irlanda Archuleta: Thank you for referring Mau Fuller to me for evaluation  Below are my notes for this consultation  If you have questions, please do not hesitate to call me  I look forward to following your patient along with you  Sincerely,        Ross Galvez PA-C        CC: No Recipients  Kristin Cardenas  1/22/2019 11:12 AM  Sign at close encounter  Assessment/Plan:   Vanna Dee is a 24 y o male who comes in today for postoperative check after laparoscopic appendectomy  On 1/9/19   Doing well without problems    Pathology: Reviewed with patient, all questions answered  Acute appendicitis      Postoperative restrictions reviewed  All questions answered  ______________________________________________________  HPI:  Vanna Dee is a 24 y o male who comes in today for postoperative check after recent  on   Currently doing well without problems, no fever or chills,no nausea and no vomiting  Reports minimal pain, tolerating diet and having bowel movements  ROS:  General ROS: negative for - chills, fatigue, fever or night sweats, weight loss  Respiratory ROS: no cough, shortness of breath, or wheezing  Cardiovascular ROS: no chest pain or dyspnea on exertion  Genito-Urinary ROS: no dysuria, trouble voiding, or hematuria  Musculoskeletal ROS: negative for - gait disturbance, joint pain or muscle pain  Neurological ROS: no TIA or stroke symptoms  GI ROS: see HPI  Skin ROS: no new rashes or lesions   Lymphatic ROS: no new adenopathy noted by pt     GYN ROS: see HPI, no new GYN history or bleeding noted  Psy ROS: no new mental or behavioral disturbances         Patient Active Problem List   Diagnosis    Attention deficit hyperactivity disorder    Intellectual disability    Psychosis, bipolar affective ([facility] )    Schizophrenia ([place] [address] )    Scoliosis    Tourette's syndrome    Closed fracture of nasal bone    Chronic midline low back pain without sciatica    Hypokalemia    Disorder of penis    Medicare annual wellness visit, subsequent    Encounter for hepatitis C virus screening test for high risk patient    Tattoos    Abdominal pain    Appendicitis       Allergies:  Patient has no known allergies  Current Outpatient Prescriptions:     docusate sodium (COLACE) 100 mg capsule, Take 1 capsule (100 mg total) by mouth 2 (two) times a day for 30 days, Disp: 60 capsule, Rfl: 0    sertraline (ZOLOFT) 25 mg tablet, Take 25 mg by mouth daily Taking 1 5 tablets daily , Disp: , Rfl:     HYDROcodone-acetaminophen (NORCO) 5-325 mg per tablet, Take 1 tablet by mouth every 4 (four) hours as needed for pain for up to 15 doses Max Daily Amount: 6 tablets (Patient not taking: Reported on 1/22/2019 ), Disp: 15 tablet, Rfl: 0    Past Medical History:   Diagnosis Date    Anxiety     Last assessed 10/18/16    Asthma     Last assessed 03/15/13    Intermittent explosive disorder     Oppositional defiant disorder     Schizophrenia ([place] [address] )     last assessed 12/04/17    Scoliosis        Past Surgical History:   Procedure Laterality Date    HERNIA REPAIR      NO PAST SURGERIES      LA LAP,APPENDECTOMY N/A 1/9/2019    Procedure: APPENDECTOMY LAPAROSCOPIC;  Surgeon: Krysten Mckeon MD;  [facility];  Service: General    LA LAP,DIAGNOSTIC ABDOMEN N/A 1/9/2019    Procedure: LAPAROSCOPY DIAGNOSTIC;  Surgeon: Krysten Mckeon MD;  [facility];  Service: General       Family History   Problem Relation Age of Onset    Diabetes Mother     Bipolar disorder Family     Schizophrenia Family     Diabetes type II Family         reports that he has been smoking Cigarettes  He has a 2 00 pack-year smoking history   He has never used smokeless tobacco  He reports that he does not drink alcohol or use drugs     PHYSICAL EXAM  General: {EXAM; GENERAL:77657::"normal","cooperative","no distress"}  Abdominal: {exam; abd ped:06158::"soft","nondistended","nontender"}  Incision: {Incision:98010::"clean, dry, and intact","healing well"}    Some portions of this record may have been generated with voice recognition software  There may be translation, syntax,  or grammatical errors  Occasional wrong word or "sound-a-like" substitutions may have occurred due to the inherent limitations of the voice recognition software  Read the chart carefully and recognize, using context, where substitutions may have occurred  If you have any questions, please contact the dictating provider for clarification or correction, as needed  This encounter has been coded by a non-certified coder         Geraldo Flores PA-C    Date: 1/22/2019 Time: 11:11 AM

## 2019-01-22 NOTE — PROGRESS NOTES
Assessment/Plan:   Chris Montez is a 24 y o male who comes in today for postoperative check after laparoscopic appendectomy  On 1/9/19   Doing well without problems    Pathology: Reviewed with patient, all questions answered  Acute appendicitis      Postoperative restrictions reviewed  All questions answered  ______________________________________________________  HPI:  Chris Montez is a 24 y o male who comes in today for postoperative check after recent  on   Currently doing well without problems, no fever or chills,no nausea and no vomiting  Reports minimal pain, tolerating diet and having bowel movements  ROS:  General ROS: negative for - chills, fatigue, fever or night sweats, weight loss  Respiratory ROS: no cough, shortness of breath, or wheezing  Cardiovascular ROS: no chest pain or dyspnea on exertion  Genito-Urinary ROS: no dysuria, trouble voiding, or hematuria  Musculoskeletal ROS: negative for - gait disturbance, joint pain or muscle pain  Neurological ROS: no TIA or stroke symptoms  GI ROS: see HPI  Skin ROS: no new rashes or lesions   Lymphatic ROS: no new adenopathy noted by pt  GYN ROS: see HPI, no new GYN history or bleeding noted  Psy ROS: no new mental or behavioral disturbances         Patient Active Problem List   Diagnosis    Attention deficit hyperactivity disorder    Intellectual disability    [facility]osis, [facility] affective [facility]    [facility]    Scoliosis    Tourette's syndrome    Closed fracture of nasal bone    Chronic midline low back pain without sciatica    Hypokalemia    Disorder of penis    Medicare annual wellness visit, subsequent    Encounter for hepatitis C virus screening test for high risk patient    Tattoos    Abdominal pain    Appendicitis       Allergies:  Patient has no known allergies        Current Outpatient Prescriptions:     docusate sodium (COLACE) 100 mg capsule, Take 1 capsule (100 mg total) by mouth 2 (two) times a day for 30 days, Disp: 60 capsule, Rfl: 0    sertraline (ZOLOFT) 25 mg tablet, Take 25 mg by mouth daily Taking 1 5 tablets daily , Disp: , Rfl:     HYDROcodone-acetaminophen (NORCO) 5-325 mg per tablet, Take 1 tablet by mouth every 4 (four) hours as needed for pain for up to 15 doses Max Daily Amount: 6 tablets (Patient not taking: Reported on 1/22/2019 ), Disp: 15 tablet, Rfl: 0    Past Medical History:   Diagnosis Date    Anxiety     Last assessed 10/18/16    Asthma     Last assessed 03/15/13    Intermittent explosive disorder     Oppositional defiant disorder     Schizophrenia ([facility] [address] )     last assessed 12/04/17    Scoliosis        Past Surgical History:   Procedure Laterality Date    HERNIA REPAIR      NO PAST SURGERIES      [place] LAP,APPENDECTOMY N/A 1/9/2019    Procedure: APPENDECTOMY LAPAROSCOPIC;  Surgeon: Rito Wilson MD;  [facility];  Service: General    [place] LAP,DIAGNOSTIC ABDOMEN N/A 1/9/2019    Procedure: LAPAROSCOPY DIAGNOSTIC;  Surgeon: Rito Wilson MD;  [facility];  Service: General       Family History   Problem Relation Age of Onset    Diabetes Mother     Bipolar disorder Family     Schizophrenia Family     Diabetes type II Family         reports that he has been smoking Cigarettes  He has a 2 00 pack-year smoking history  He has never used smokeless tobacco  He reports that he does not drink alcohol or use drugs  PHYSICAL EXAM  General: normal, cooperative, no distress  Abdominal: soft, nondistended or nontender  Incision: clean, dry, and intact and healing well    Some portions of this record may have been generated with voice recognition software  There may be translation, syntax,  or grammatical errors  Occasional wrong word or "sound-a-like" substitutions may have occurred due to the inherent limitations of the voice recognition software  Read the chart carefully and recognize, using context, where substitutions may have occurred   If you have any questions, please contact the dictating provider for clarification or correction, as needed  This encounter has been coded by a non-certified coder         Jessica Koch PA-C    Date: 1/22/2019 Time: 11:11 AM

## 2019-01-22 NOTE — LETTER
January 22, 2019     Krysten Grover PA-C  [address]     Patient: Venkata Coleman   YOB: 1995   Date of Visit: 1/22/2019       Dear Dr Karen Jaquez: Thank you for referring [place] to me for evaluation  Below are my notes for this consultation  If you have questions, please do not hesitate to call me  I look forward to following your patient along with you  Sincerely,        Chente Hendrix PA-C        CC: No Recipients  Simon Vieiras  1/22/2019 11:13 AM  Sign at close encounter  Assessment/Plan:   Venkata Coleman is a 24 y o male who comes in today for postoperative check after laparoscopic appendectomy  On 1/9/19   Doing well without problems    Pathology: Reviewed with patient, all questions answered  Acute appendicitis      Postoperative restrictions reviewed  All questions answered  ______________________________________________________  HPI:  Venkata Coleman is a 24 y o male who comes in today for postoperative check after recent  on   Currently doing well without problems, no fever or chills,no nausea and no vomiting  Reports minimal pain, tolerating diet and having bowel movements  ROS:  General ROS: negative for - chills, fatigue, fever or night sweats, weight loss  Respiratory ROS: no cough, shortness of breath, or wheezing  Cardiovascular ROS: no chest pain or dyspnea on exertion  Genito-Urinary ROS: no dysuria, trouble voiding, or hematuria  Musculoskeletal ROS: negative for - gait disturbance, joint pain or muscle pain  Neurological ROS: no TIA or stroke symptoms  GI ROS: see HPI  Skin ROS: no new rashes or lesions   Lymphatic ROS: no new adenopathy noted by pt     GYN ROS: see HPI, no new GYN history or bleeding noted  Psy ROS: no new mental or behavioral disturbances         Patient Active Problem List   Diagnosis    Attention deficit hyperactivity disorder    Intellectual disability    [facility]osis, [facility] ([facility] )    Schizophrenia ([place] [address] )    Scoliosis    Tourette's syndrome    Closed fracture of nasal bone    Chronic midline low back pain without sciatica    Hypokalemia    Disorder of penis    Medicare annual wellness visit, subsequent    Encounter for hepatitis C virus screening test for high risk patient    Tattoos    Abdominal pain    Appendicitis       Allergies:  Patient has no known allergies  Current Outpatient Prescriptions:     docusate sodium (COLACE) 100 mg capsule, Take 1 capsule (100 mg total) by mouth 2 (two) times a day for 30 days, Disp: 60 capsule, Rfl: 0    sertraline (ZOLOFT) 25 mg tablet, Take 25 mg by mouth daily Taking 1 5 tablets daily , Disp: , Rfl:     HYDROcodone-acetaminophen (NORCO) 5-325 mg per tablet, Take 1 tablet by mouth every 4 (four) hours as needed for pain for up to 15 doses Max Daily Amount: 6 tablets (Patient not taking: Reported on 1/22/2019 ), Disp: 15 tablet, Rfl: 0    Past Medical History:   Diagnosis Date    Anxiety     Last assessed 10/18/16    Asthma     Last assessed 03/15/13    Intermittent explosive disorder     Oppositional defiant disorder     Schizophrenia ([place] [address] )     last assessed 12/04/17    Scoliosis        Past Surgical History:   Procedure Laterality Date    HERNIA REPAIR      NO PAST SURGERIES      [place] LAP,APPENDECTOMY N/A 1/9/2019    Procedure: APPENDECTOMY LAPAROSCOPIC;  Surgeon: Abigail Kinney MD;  [facility];  Service: General    [place] LAP,DIAGNOSTIC ABDOMEN N/A 1/9/2019    Procedure: LAPAROSCOPY DIAGNOSTIC;  Surgeon: Abigail Kinney MD;  [facility];  Service: General       Family History   Problem Relation Age of Onset    Diabetes Mother     Bipolar disorder Family     Schizophrenia Family     Diabetes type II Family         reports that he has been smoking Cigarettes  He has a 2 00 pack-year smoking history   He has never used smokeless tobacco  He reports that he does not drink alcohol or use drugs     PHYSICAL EXAM  General: normal, cooperative, no distress  Abdominal: soft, nondistended or nontender  Incision: clean, dry, and intact and healing well    Some portions of this record may have been generated with voice recognition software  There may be translation, syntax,  or grammatical errors  Occasional wrong word or "sound-a-like" substitutions may have occurred due to the inherent limitations of the voice recognition software  Read the chart carefully and recognize, using context, where substitutions may have occurred  If you have any questions, please contact the dictating provider for clarification or correction, as needed  This encounter has been coded by a non-certified coder         Gary Raymond PA-C    Date: 1/22/2019 Time: 11:11 AM

## 2019-02-09 ENCOUNTER — HOSPITAL ENCOUNTER (EMERGENCY)
Facility: HOSPITAL | Age: 24
Discharge: HOME | End: 2019-02-10
Attending: EMERGENCY MEDICINE | Admitting: EMERGENCY MEDICINE
Payer: MEDICARE

## 2019-02-09 DIAGNOSIS — R00.2 PALPITATIONS: Primary | ICD-10-CM

## 2019-02-09 PROCEDURE — 93005 ELECTROCARDIOGRAM TRACING: CPT

## 2019-02-09 PROCEDURE — 99285 EMERGENCY DEPT VISIT HI MDM: CPT

## 2019-02-10 ENCOUNTER — HOSPITAL ENCOUNTER (OUTPATIENT)
Dept: RADIOLOGY | Facility: HOSPITAL | Age: 24
Discharge: HOME | End: 2019-02-10
Attending: EMERGENCY MEDICINE
Payer: MEDICARE

## 2019-02-10 ENCOUNTER — HOSPITAL ENCOUNTER (EMERGENCY)
Facility: HOSPITAL | Age: 24
Discharge: HOME | End: 2019-02-10
Attending: EMERGENCY MEDICINE
Payer: MEDICARE

## 2019-02-10 VITALS
OXYGEN SATURATION: 99 % | HEART RATE: 86 BPM | BODY MASS INDEX: 22.08 KG/M2 | WEIGHT: 171.96 LBS | RESPIRATION RATE: 16 BRPM | TEMPERATURE: 98.3 F | SYSTOLIC BLOOD PRESSURE: 127 MMHG | DIASTOLIC BLOOD PRESSURE: 66 MMHG

## 2019-02-10 VITALS
HEART RATE: 96 BPM | BODY MASS INDEX: 22.08 KG/M2 | RESPIRATION RATE: 18 BRPM | WEIGHT: 171.96 LBS | DIASTOLIC BLOOD PRESSURE: 81 MMHG | TEMPERATURE: 98.5 F | OXYGEN SATURATION: 96 % | SYSTOLIC BLOOD PRESSURE: 156 MMHG

## 2019-02-10 DIAGNOSIS — R21 RASH: ICD-10-CM

## 2019-02-10 DIAGNOSIS — R11.2 NAUSEA AND VOMITING: ICD-10-CM

## 2019-02-10 DIAGNOSIS — E87.6 ACUTE HYPOKALEMIA: ICD-10-CM

## 2019-02-10 DIAGNOSIS — R00.2 PALPITATIONS: Primary | ICD-10-CM

## 2019-02-10 DIAGNOSIS — R07.9 CHEST PAIN: ICD-10-CM

## 2019-02-10 LAB
ALBUMIN SERPL BCP-MCNC: 4.6 G/DL (ref 3.5–5)
ALBUMIN SERPL BCP-MCNC: 4.8 G/DL (ref 3.5–5)
ALP SERPL-CCNC: 76 U/L (ref 46–116)
ALP SERPL-CCNC: 85 U/L (ref 46–116)
ALT SERPL W P-5'-P-CCNC: 48 U/L (ref 12–78)
ALT SERPL W P-5'-P-CCNC: 53 U/L (ref 12–78)
AMPHETAMINES SERPL QL SCN: NEGATIVE
ANION GAP SERPL CALCULATED.3IONS-SCNC: 11 MMOL/L (ref 4–13)
ANION GAP SERPL CALCULATED.3IONS-SCNC: 12 MMOL/L (ref 4–13)
APTT PPP: 28 SECONDS (ref 26–38)
AST SERPL W P-5'-P-CCNC: 19 U/L (ref 5–45)
AST SERPL W P-5'-P-CCNC: 19 U/L (ref 5–45)
ATRIAL RATE: 117 BPM
BARBITURATES UR QL: NEGATIVE
BASOPHILS # BLD AUTO: 0.04 THOUSANDS/ΜL (ref 0–0.1)
BASOPHILS # BLD AUTO: 0.04 THOUSANDS/ΜL (ref 0–0.1)
BASOPHILS NFR BLD AUTO: 0 % (ref 0–1)
BASOPHILS NFR BLD AUTO: 0 % (ref 0–1)
BENZODIAZ UR QL: NEGATIVE
BILIRUB SERPL-MCNC: 0.92 MG/DL (ref 0.2–1)
BILIRUB SERPL-MCNC: 2.09 MG/DL (ref 0.2–1)
BILIRUB UR QL STRIP: NEGATIVE
BUN SERPL-MCNC: 8 MG/DL (ref 5–25)
BUN SERPL-MCNC: 9 MG/DL (ref 5–25)
CALCIUM SERPL-MCNC: 9.8 MG/DL (ref 8.3–10.1)
CALCIUM SERPL-MCNC: 9.9 MG/DL (ref 8.3–10.1)
CHLORIDE SERPL-SCNC: 103 MMOL/L (ref 100–108)
CHLORIDE SERPL-SCNC: 104 MMOL/L (ref 100–108)
CLARITY UR: CLEAR
CO2 SERPL-SCNC: 26 MMOL/L (ref 21–32)
CO2 SERPL-SCNC: 27 MMOL/L (ref 21–32)
COCAINE UR QL: NEGATIVE
COLOR UR: NORMAL
CREAT SERPL-MCNC: 1.04 MG/DL (ref 0.6–1.3)
CREAT SERPL-MCNC: 1.06 MG/DL (ref 0.6–1.3)
DEPRECATED D DIMER PPP: 279 NG/ML (FEU)
EOSINOPHIL # BLD AUTO: 0.05 THOUSAND/ΜL (ref 0–0.61)
EOSINOPHIL # BLD AUTO: 0.06 THOUSAND/ΜL (ref 0–0.61)
EOSINOPHIL NFR BLD AUTO: 0 % (ref 0–6)
EOSINOPHIL NFR BLD AUTO: 0 % (ref 0–6)
ERYTHROCYTE [DISTWIDTH] IN BLOOD BY AUTOMATED COUNT: 11.9 % (ref 11.6–15.1)
ERYTHROCYTE [DISTWIDTH] IN BLOOD BY AUTOMATED COUNT: 12.1 % (ref 11.6–15.1)
ETHANOL SERPL-MCNC: <3 MG/DL (ref 0–3)
GFR SERPL CREATININE-BSD FRML MDRD: 100 ML/MIN/1.73SQ M
GFR SERPL CREATININE-BSD FRML MDRD: 98 ML/MIN/1.73SQ M
GLUCOSE SERPL-MCNC: 95 MG/DL (ref 65–140)
GLUCOSE SERPL-MCNC: 99 MG/DL (ref 65–140)
GLUCOSE UR STRIP-MCNC: NEGATIVE MG/DL
HCT VFR BLD AUTO: 49.1 % (ref 36.5–49.3)
HCT VFR BLD AUTO: 51.7 % (ref 36.5–49.3)
HGB BLD-MCNC: 17.8 G/DL (ref 12–17)
HGB BLD-MCNC: 18.4 G/DL (ref 12–17)
HGB UR QL STRIP.AUTO: NEGATIVE
IMM GRANULOCYTES # BLD AUTO: 0.03 THOUSAND/UL (ref 0–0.2)
IMM GRANULOCYTES # BLD AUTO: 0.04 THOUSAND/UL (ref 0–0.2)
IMM GRANULOCYTES NFR BLD AUTO: 0 % (ref 0–2)
IMM GRANULOCYTES NFR BLD AUTO: 0 % (ref 0–2)
INR PPP: 1.11 (ref 0.86–1.17)
KETONES UR STRIP-MCNC: NEGATIVE MG/DL
LEUKOCYTE ESTERASE UR QL STRIP: NEGATIVE
LYMPHOCYTES # BLD AUTO: 1.72 THOUSANDS/ΜL (ref 0.6–4.47)
LYMPHOCYTES # BLD AUTO: 1.77 THOUSANDS/ΜL (ref 0.6–4.47)
LYMPHOCYTES NFR BLD AUTO: 13 % (ref 14–44)
LYMPHOCYTES NFR BLD AUTO: 15 % (ref 14–44)
MCH RBC QN AUTO: 31.7 PG (ref 26.8–34.3)
MCH RBC QN AUTO: 32.4 PG (ref 26.8–34.3)
MCHC RBC AUTO-ENTMCNC: 35.6 G/DL (ref 31.4–37.4)
MCHC RBC AUTO-ENTMCNC: 36.3 G/DL (ref 31.4–37.4)
MCV RBC AUTO: 89 FL (ref 82–98)
MCV RBC AUTO: 89 FL (ref 82–98)
METHADONE UR QL: NEGATIVE
MONOCYTES # BLD AUTO: 0.61 THOUSAND/ΜL (ref 0.17–1.22)
MONOCYTES # BLD AUTO: 0.66 THOUSAND/ΜL (ref 0.17–1.22)
MONOCYTES NFR BLD AUTO: 5 % (ref 4–12)
MONOCYTES NFR BLD AUTO: 5 % (ref 4–12)
NEUTROPHILS # BLD AUTO: 11.32 THOUSANDS/ΜL (ref 1.85–7.62)
NEUTROPHILS # BLD AUTO: 9.14 THOUSANDS/ΜL (ref 1.85–7.62)
NEUTS SEG NFR BLD AUTO: 80 % (ref 43–75)
NEUTS SEG NFR BLD AUTO: 82 % (ref 43–75)
NITRITE UR QL STRIP: NEGATIVE
NRBC BLD AUTO-RTO: 0 /100 WBCS
NRBC BLD AUTO-RTO: 0 /100 WBCS
OPIATES UR QL SCN: NEGATIVE
P AXIS: 65 DEGREES
PCP UR QL: NEGATIVE
PH UR STRIP.AUTO: 7 [PH] (ref 4.5–8)
PLATELET # BLD AUTO: 201 THOUSANDS/UL (ref 149–390)
PLATELET # BLD AUTO: 221 THOUSANDS/UL (ref 149–390)
PMV BLD AUTO: 10 FL (ref 8.9–12.7)
PMV BLD AUTO: 10.2 FL (ref 8.9–12.7)
POTASSIUM SERPL-SCNC: 3 MMOL/L (ref 3.5–5.3)
POTASSIUM SERPL-SCNC: 3.2 MMOL/L (ref 3.5–5.3)
PR INTERVAL: 184 MS
PROT SERPL-MCNC: 7.6 G/DL (ref 6.4–8.2)
PROT SERPL-MCNC: 8 G/DL (ref 6.4–8.2)
PROT UR STRIP-MCNC: NEGATIVE MG/DL
PROTHROMBIN TIME: 14.4 SECONDS (ref 11.8–14.2)
QRS AXIS: 87 DEGREES
QRSD INTERVAL: 80 MS
QT INTERVAL: 310 MS
QTC INTERVAL: 432 MS
RBC # BLD AUTO: 5.5 MILLION/UL (ref 3.88–5.62)
RBC # BLD AUTO: 5.8 MILLION/UL (ref 3.88–5.62)
SODIUM SERPL-SCNC: 141 MMOL/L (ref 136–145)
SODIUM SERPL-SCNC: 142 MMOL/L (ref 136–145)
SP GR UR STRIP.AUTO: 1.01 (ref 1–1.03)
T WAVE AXIS: 31 DEGREES
THC UR QL: NEGATIVE
TROPONIN I SERPL-MCNC: <0.02 NG/ML
TROPONIN I SERPL-MCNC: <0.02 NG/ML
TSH SERPL DL<=0.05 MIU/L-ACNC: 1.38 UIU/ML (ref 0.36–3.74)
UROBILINOGEN UR QL STRIP.AUTO: 0.2 E.U./DL
VENTRICULAR RATE: 117 BPM
WBC # BLD AUTO: 11.6 THOUSAND/UL (ref 4.31–10.16)
WBC # BLD AUTO: 13.88 THOUSAND/UL (ref 4.31–10.16)

## 2019-02-10 PROCEDURE — 93005 ELECTROCARDIOGRAM TRACING: CPT

## 2019-02-10 PROCEDURE — 96375 TX/PRO/DX INJ NEW DRUG ADDON: CPT

## 2019-02-10 PROCEDURE — 85610 PROTHROMBIN TIME: CPT | Performed by: EMERGENCY MEDICINE

## 2019-02-10 PROCEDURE — 80053 COMPREHEN METABOLIC PANEL: CPT | Performed by: EMERGENCY MEDICINE

## 2019-02-10 PROCEDURE — 84484 ASSAY OF TROPONIN QUANT: CPT | Performed by: EMERGENCY MEDICINE

## 2019-02-10 PROCEDURE — 85025 COMPLETE CBC W/AUTO DIFF WBC: CPT | Performed by: EMERGENCY MEDICINE

## 2019-02-10 PROCEDURE — 96374 THER/PROPH/DIAG INJ IV PUSH: CPT

## 2019-02-10 PROCEDURE — 96360 HYDRATION IV INFUSION INIT: CPT

## 2019-02-10 PROCEDURE — 80320 DRUG SCREEN QUANTALCOHOLS: CPT | Performed by: EMERGENCY MEDICINE

## 2019-02-10 PROCEDURE — 85379 FIBRIN DEGRADATION QUANT: CPT | Performed by: EMERGENCY MEDICINE

## 2019-02-10 PROCEDURE — 80307 DRUG TEST PRSMV CHEM ANLYZR: CPT | Performed by: EMERGENCY MEDICINE

## 2019-02-10 PROCEDURE — 85730 THROMBOPLASTIN TIME PARTIAL: CPT | Performed by: EMERGENCY MEDICINE

## 2019-02-10 PROCEDURE — 99285 EMERGENCY DEPT VISIT HI MDM: CPT

## 2019-02-10 PROCEDURE — 71045 X-RAY EXAM CHEST 1 VIEW: CPT

## 2019-02-10 PROCEDURE — 81003 URINALYSIS AUTO W/O SCOPE: CPT | Performed by: EMERGENCY MEDICINE

## 2019-02-10 PROCEDURE — 96361 HYDRATE IV INFUSION ADD-ON: CPT

## 2019-02-10 PROCEDURE — 84443 ASSAY THYROID STIM HORMONE: CPT | Performed by: EMERGENCY MEDICINE

## 2019-02-10 PROCEDURE — 36415 COLL VENOUS BLD VENIPUNCTURE: CPT | Performed by: EMERGENCY MEDICINE

## 2019-02-10 PROCEDURE — 93010 ELECTROCARDIOGRAM REPORT: CPT | Performed by: INTERNAL MEDICINE

## 2019-02-10 RX ORDER — METOCLOPRAMIDE HYDROCHLORIDE 5 MG/ML
10 INJECTION INTRAMUSCULAR; INTRAVENOUS ONCE
Status: COMPLETED | OUTPATIENT
Start: 2019-02-10 | End: 2019-02-10

## 2019-02-10 RX ORDER — METOCLOPRAMIDE 10 MG/1
10 TABLET ORAL EVERY 6 HOURS
Qty: 10 TABLET | Refills: 0 | Status: SHIPPED | OUTPATIENT
Start: 2019-02-10 | End: 2019-03-06 | Stop reason: ALTCHOICE

## 2019-02-10 RX ORDER — KETOROLAC TROMETHAMINE 30 MG/ML
15 INJECTION, SOLUTION INTRAMUSCULAR; INTRAVENOUS ONCE
Status: COMPLETED | OUTPATIENT
Start: 2019-02-10 | End: 2019-02-10

## 2019-02-10 RX ORDER — BACITRACIN, NEOMYCIN, POLYMYXIN B 400; 3.5; 5 [USP'U]/G; MG/G; [USP'U]/G
OINTMENT TOPICAL 2 TIMES DAILY
Qty: 15 G | Refills: 0 | Status: SHIPPED | OUTPATIENT
Start: 2019-02-10 | End: 2019-03-06 | Stop reason: ALTCHOICE

## 2019-02-10 RX ORDER — POTASSIUM CHLORIDE 20 MEQ/1
40 TABLET, EXTENDED RELEASE ORAL ONCE
Status: COMPLETED | OUTPATIENT
Start: 2019-02-10 | End: 2019-02-10

## 2019-02-10 RX ADMIN — SODIUM CHLORIDE 1000 ML: 0.9 INJECTION, SOLUTION INTRAVENOUS at 00:30

## 2019-02-10 RX ADMIN — KETOROLAC TROMETHAMINE 15 MG: 30 INJECTION, SOLUTION INTRAMUSCULAR at 18:08

## 2019-02-10 RX ADMIN — POTASSIUM CHLORIDE 40 MEQ: 1500 TABLET, EXTENDED RELEASE ORAL at 18:57

## 2019-02-10 RX ADMIN — METOCLOPRAMIDE 10 MG: 5 INJECTION, SOLUTION INTRAMUSCULAR; INTRAVENOUS at 18:08

## 2019-02-10 RX ADMIN — SODIUM CHLORIDE 1000 ML: 0.9 INJECTION, SOLUTION INTRAVENOUS at 18:08

## 2019-02-10 NOTE — ED PROVIDER NOTES
History  Chief Complaint   Patient presents with    Skin Problem     Pt arrives for discolored skin on elbow   Rapid Heart Rate     Reports feeling like his heart is racing  States was evaluated at this facility last night  History provided by:  Parent   used: No    Rapid Heart Rate   Palpitations quality:  Fast  Duration:  1 day  Timing:  Constant  Progression:  Unchanged  Chronicity:  New  Relieved by:  None tried  Worsened by:  Nothing  Ineffective treatments:  None tried  Associated symptoms: chest pain ("It's very mild"), nausea and vomiting    Associated symptoms: no back pain, no cough, no diaphoresis, no dizziness, no numbness, no shortness of breath and no weakness        Prior to Admission Medications   Prescriptions Last Dose Informant Patient Reported? Taking?    HYDROcodone-acetaminophen (NORCO) 5-325 mg per tablet Not Taking at Unknown time Self No No   Sig: Take 1 tablet by mouth every 4 (four) hours as needed for pain for up to 15 doses Max Daily Amount: 6 tablets   Patient not taking: Reported on 1/22/2019    sertraline (ZOLOFT) 25 mg tablet  Self Yes Yes   Sig: Take 25 mg by mouth daily Taking 1 5 tablets daily       Facility-Administered Medications: None       Past Medical History:   Diagnosis Date    Anxiety     Last assessed 10/18/16    Asthma     Last assessed 03/15/13    Intermittent explosive disorder     Oppositional defiant disorder     Schizophrenia ([facility] [address] )     last assessed 12/04/17    Scoliosis        Past Surgical History:   Procedure Laterality Date    HERNIA REPAIR      NO PAST SURGERIES      [place] LAP,APPENDECTOMY N/A 1/9/2019    Procedure: APPENDECTOMY LAPAROSCOPIC;  Surgeon: Alisa Wang MD;  [facility];  Service: General    [place] LAP,DIAGNOSTIC ABDOMEN N/A 1/9/2019    Procedure: LAPAROSCOPY DIAGNOSTIC;  Surgeon: Alisa Wang MD;  [facility];  Service: General       Family History   Problem Relation Age of Onset    Diabetes Mother     Bipolar disorder Family     Schizophrenia Family     Diabetes type II Family      I have reviewed and agree with the history as documented  Social History     Tobacco Use    Smoking status: Current Every Day Smoker     Packs/day: 0 50     Years: 4 00     Pack years: 2 00     Types: Cigarettes    Smokeless tobacco: Never Used    Tobacco comment: Nicotine vapor use   Substance Use Topics    Alcohol use: No    Drug use: Not Currently     Comment: Marijuana use        Review of Systems   Constitutional: Negative for chills, diaphoresis and fever  Respiratory: Negative for cough, chest tightness and shortness of breath  Cardiovascular: Positive for chest pain ("It's very mild") and palpitations  Negative for leg swelling  Gastrointestinal: Positive for diarrhea, nausea and vomiting  Negative for abdominal pain  Musculoskeletal: Negative for back pain and neck pain  Skin: Positive for rash (Right elbow)  Negative for pallor  Neurological: Negative for dizziness, syncope, facial asymmetry, speech difficulty, weakness, light-headedness and numbness  All other systems reviewed and are negative  Physical Exam  Physical Exam   Constitutional: He is oriented to person, place, and time  He appears well-developed and well-nourished  No distress  HENT:   Head: Normocephalic  Mouth/Throat: Oropharynx is clear and moist and mucous membranes are normal    Neck: Normal range of motion  Neck supple  Cardiovascular: Regular rhythm, normal heart sounds and intact distal pulses  Tachycardia present  Exam reveals no gallop and no friction rub  No murmur heard  Pulmonary/Chest: Effort normal and breath sounds normal  No respiratory distress  He has no wheezes  He has no rales  Abdominal: Soft  Normal appearance and bowel sounds are normal  He exhibits no distension and no mass  There is no hepatosplenomegaly  There is no tenderness   There is no rigidity, no rebound, no guarding, no CVA tenderness, no tenderness at McBurney's point and negative Sanches's sign  Lymphadenopathy:     He has no cervical adenopathy  Neurological: He is alert and oriented to person, place, and time  Skin: Skin is warm, dry and intact  Petechiae (Medial left elbow) noted  No rash noted  No pallor  Nursing note and vitals reviewed  Vital Signs  ED Triage Vitals [02/10/19 1718]   Temperature Pulse Respirations Blood Pressure SpO2   98 3 °F (36 8 °C) (!) 123 16 116/64 97 %      Temp Source Heart Rate Source Patient Position - Orthostatic VS BP Location FiO2 (%)   Temporal Monitor Sitting Right arm --      Pain Score       No Pain           Vitals:    02/10/19 1718 02/10/19 1949   BP: 116/64 127/66   Pulse: (!) 123 86   Patient Position - Orthostatic VS: Sitting Lying       Visual Acuity      ED Medications  Medications   sodium chloride 0 9 % bolus 1,000 mL (0 mL Intravenous Stopped 2/10/19 1857)   metoclopramide (REGLAN) injection 10 mg (10 mg Intravenous Given 2/10/19 1808)   ketorolac (TORADOL) injection 15 mg (15 mg Intravenous Given 2/10/19 1808)   potassium chloride (K-DUR,KLOR-CON) CR tablet 40 mEq (40 mEq Oral Given 2/10/19 1857)       Diagnostic Studies  Results Reviewed     Procedure Component Value Units Date/Time    TSH, 3rd generation with Free T4 reflex [[id number]]  (Normal) Collected:  02/10/19 1805    Lab Status:  Final result Specimen:  Blood from Arm, Right Updated:  02/10/19 1844     TSH 3RD GENERATON 1 378 uIU/mL     Narrative:       Patients undergoing fluorescein dye angiography may retain small amounts of fluorescein in the body for 48-72 hours post procedure  Samples containing fluorescein can produce falsely depressed TSH values  If the patient had this procedure,a specimen should be resubmitted post fluorescein clearance      Troponin I [[id number]]  (Normal) Collected:  02/10/19 1805    Lab Status:  Final result Specimen:  Blood from Arm, Right Updated:  02/10/19 1837     Troponin I <0 02 ng/mL     D-Dimer [[id number]]  (Normal) Collected:  02/10/19 1805    Lab Status:  Final result Specimen:  Blood from Arm, Right Updated:  02/10/19 1836     D-Dimer, Quant 279 ng/ml (FEU)     Comprehensive metabolic panel [[id number]]  (Abnormal) Collected:  02/10/19 1805    Lab Status:  Final result Specimen:  Blood from Arm, Right Updated:  02/10/19 1836     Sodium 141 mmol/L      Potassium 3 2 mmol/L      Chloride 104 mmol/L      CO2 26 mmol/L      ANION GAP 11 mmol/L      BUN 9 mg/dL      Creatinine 1 06 mg/dL      Glucose 95 mg/dL      Calcium 9 9 mg/dL      AST 19 U/L      ALT 48 U/L      Alkaline Phosphatase 76 U/L      Total Protein 7 6 g/dL      Albumin 4 6 g/dL      Total Bilirubin 2 09 mg/dL      eGFR 98 ml/min/1 73sq m     Narrative:       National Kidney Disease Education Program recommendations are as follows:  GFR calculation is accurate only with a steady state creatinine  Chronic Kidney disease less than 60 ml/min/1 73 sq  meters  Kidney failure less than 15 ml/min/1 73 sq  meters      Protime-INR [[id number]]  (Abnormal) Collected:  02/10/19 1805    Lab Status:  Final result Specimen:  Blood from Arm, Right Updated:  02/10/19 1836     Protime 14 4 seconds      INR 1 11    APTT [[id number]]  (Normal) Collected:  02/10/19 1805    Lab Status:  Final result Specimen:  Blood from Arm, Right Updated:  02/10/19 1836     PTT 28 seconds     CBC and differential [[id number]]  (Abnormal) Collected:  02/10/19 1805    Lab Status:  Final result Specimen:  Blood from Arm, Right Updated:  02/10/19 1817     WBC 11 60 Thousand/uL      RBC 5 50 Million/uL      Hemoglobin 17 8 g/dL      Hematocrit 49 1 %      MCV 89 fL      MCH 32 4 pg      MCHC 36 3 g/dL      RDW 11 9 %      MPV 10 0 fL      Platelets 619 Thousands/uL      nRBC 0 /100 WBCs      Neutrophils Relative 80 %      Immat GRANS % 0 %      Lymphocytes Relative 15 %      Monocytes Relative 5 %      Eosinophils Relative 0 %      Basophils Relative 0 % Neutrophils Absolute 9 14 Thousands/µL      Immature Grans Absolute 0 04 Thousand/uL      Lymphocytes Absolute 1 72 Thousands/µL      Monocytes Absolute 0 61 Thousand/µL      Eosinophils Absolute 0 05 Thousand/µL      Basophils Absolute 0 04 Thousands/µL                  No orders to display              Procedures  ECG 12 Lead Documentation  Date/Time: 2/10/2019 6:01 PM  Performed by: DO Pasquale  Authorized by: DO Pasquale     Indications / Diagnosis:  Palpitations  ECG reviewed by me, the ED Provider: yes    Patient location:  Bedside  Previous ECG:     Previous ECG:  Compared to current    Comparison ECG info:  2/9/2019    Similarity:  No change  Rate:     ECG rate:  109    ECG rate assessment: tachycardic    Rhythm:     Rhythm: sinus tachycardia    Ectopy:     Ectopy: none    QRS:     QRS axis:  Normal    QRS intervals:  Normal  ST segments:     ST segments:  Normal  T waves:     T waves: normal             Phone Contacts  ED Phone Contact    ED Course  ED Course as of Feb 10 2017   Sun Feb 10, 2019   1850 Will replete   Potassium(!): 3 2   1918 Pt sleeping  HR is 76 on the monitor  Updated him on results  Pt asking for abx for his rash on his elbow because he believes a spider bit him  There is no erythema or warm  The rash is petechial   Will give pt neosporin ointment                                    MDM  Number of Diagnoses or Management Options     Amount and/or Complexity of Data Reviewed  Clinical lab tests: reviewed and ordered  Tests in the medicine section of CPT®: ordered and reviewed        Disposition  Final diagnoses:   Palpitations   Rash   Nausea and vomiting   Acute hypokalemia     Time reflects when diagnosis was documented in both MDM as applicable and the Disposition within this note     Time User Action Codes Description Comment    2/10/2019  7:19 PM Patricia Leak [R00 2] Palpitations     2/10/2019  7:20 PM Nadiya Plasencia 167 Rash     2/10/2019  7:20 PM Darrell Plasencia 48 [R11 2] Nausea and vomiting     2/10/2019  7:20 PM Darrell Plasencia 48 [E87 6] Acute hypokalemia       ED Disposition     ED Disposition Condition Date/Time Comment    Discharge Stable Sun Feb 10, 2019  7:19 PM Felicitas Boateng discharge to home/self care  Follow-up Information     Follow up With Specialties Details Why Contact Info    Brooklynn Rivera PA-C Family Medicine, Physician Assistant Schedule an appointment as soon as possible for a visit  For follow up [address] [place] [address]  [phone]            Discharge Medication List as of 2/10/2019  7:20 PM      START taking these medications    Details   metoclopramide (REGLAN) 10 mg tablet Take 1 tablet (10 mg total) by mouth every 6 (six) hours, Starting Sun 2/10/2019, Print      neomycin-bacitracin-polymyxin b (NEOSPORIN) ointment Apply topically 2 (two) times a day, Starting Sun 2/10/2019, Print         CONTINUE these medications which have NOT CHANGED    Details   sertraline (ZOLOFT) 25 mg tablet Take 25 mg by mouth daily Taking 1 5 tablets daily , Starting Mon 12/4/2017, Historical Med      HYDROcodone-acetaminophen (NORCO) 5-325 mg per tablet Take 1 tablet by mouth every 4 (four) hours as needed for pain for up to 15 doses Max Daily Amount: 6 tablets, Starting Thu 1/10/2019, Print           No discharge procedures on file      ED Provider  Electronically Signed by           Yoli Nick DO  02/10/19 2018

## 2019-02-10 NOTE — ED PROVIDER NOTES
History  Chief Complaint   Patient presents with    Rapid Heart Rate     Pt reports weaking up feeling like his heart was racing  Pt reports the  at his house earlier and he is concerned he may have inhaled some chemicals  Pt very anxious and restless in triage  C/o palpitations today, he woke up and felt his heart racing  He had an  at his house and feels like he may have been exposed to something  No fevers, no cough, no sob  Prior to Admission Medications   Prescriptions Last Dose Informant Patient Reported? Taking? HYDROcodone-acetaminophen (NORCO) 5-325 mg per tablet  Self No No   Sig: Take 1 tablet by mouth every 4 (four) hours as needed for pain for up to 15 doses Max Daily Amount: 6 tablets   Patient not taking: Reported on 1/22/2019    sertraline (ZOLOFT) 25 mg tablet  Self Yes No   Sig: Take 25 mg by mouth daily Taking 1 5 tablets daily       Facility-Administered Medications: None       Past Medical History:   Diagnosis Date    Anxiety     Last assessed 10/18/16    Asthma     Last assessed 03/15/13    Intermittent explosive disorder     Oppositional defiant disorder     [facility]     last assessed 12/04/17    Scoliosis        Past Surgical History:   Procedure Laterality Date    HERNIA REPAIR      NO PAST SURGERIES      LA LAP,APPENDECTOMY N/A 1/9/2019    Procedure: APPENDECTOMY LAPAROSCOPIC;  Surgeon: Mauro Trujillo MD;  Location: [facility];  Service: General    LA LAP,DIAGNOSTIC ABDOMEN N/A 1/9/2019    Procedure: LAPAROSCOPY DIAGNOSTIC;  Surgeon: Mauro Trujillo MD;  Location[facility];  Service: General       Family History   Problem Relation Age of Onset    Diabetes Mother     Bipolar disorder Family     Schizophrenia Family     Diabetes type II Family      I have reviewed and agree with the history as documented      Social History     Tobacco Use    Smoking status: Current Every Day Smoker     Packs/day: 0 50     Years: 4 00 Pack years: 2 00     Types: Cigarettes    Smokeless tobacco: Never Used    Tobacco comment: Nicotine vapor use   Substance Use Topics    Alcohol use: No    Drug use: Not Currently     Comment: Marijuana use        Review of Systems   Constitutional: Negative for appetite change, fatigue and fever  HENT: Negative for rhinorrhea and sore throat  Respiratory: Negative for cough, shortness of breath and wheezing  Cardiovascular: Positive for palpitations  Negative for chest pain and leg swelling  Gastrointestinal: Negative for abdominal pain, diarrhea and vomiting  Genitourinary: Negative for dysuria and flank pain  Musculoskeletal: Negative for back pain and neck pain  Skin: Negative for rash  Neurological: Negative for syncope and headaches  Psychiatric/Behavioral:        Mood normal       Physical Exam  Physical Exam   Constitutional: He is oriented to person, place, and time  He appears well-developed and well-nourished  HENT:   Head: Normocephalic and atraumatic  Neck: Normal range of motion  Neck supple  Cardiovascular: Normal rate and regular rhythm  Pulmonary/Chest: Effort normal and breath sounds normal    Abdominal: Soft  There is no tenderness  Musculoskeletal: Normal range of motion  Neurological: He is alert and oriented to person, place, and time  Skin: Skin is warm and dry  Nursing note and vitals reviewed        Vital Signs  ED Triage Vitals [02/09/19 2342]   Temperature Pulse Respirations Blood Pressure SpO2   98 5 °F (36 9 °C) (!) 123 22 (!) 215/129 99 %      Temp Source Heart Rate Source Patient Position - Orthostatic VS BP Location FiO2 (%)   Temporal Monitor Sitting Right arm --      Pain Score       No Pain           Vitals:    02/09/19 2342 02/10/19 0158   BP: (!) 215/129 156/81   Pulse: (!) 123 96   Patient Position - Orthostatic VS: Sitting Lying       Visual Acuity      ED Medications  Medications   sodium chloride 0 9 % bolus 1,000 mL (0 mL Intravenous Stopped 2/10/19 0145)       Diagnostic Studies  Results Reviewed     Procedure Component Value Units Date/Time    LAB RESULTS [[id number]] Resulted:  02/11/19 1430    Lab Status:  Final result Updated:  02/11/19 1430    Rapid drug screen, urine [[id number]]  (Normal) Collected:  02/10/19 0033    Lab Status:  Final result Specimen:  Urine Updated:  02/10/19 0339     Amph/Meth UR Negative     Barbiturate Ur Negative     Benzodiazepine Urine Negative     Cocaine Urine Negative     Methadone Urine Negative     Opiate Urine Negative     PCP Ur Negative     THC Urine Negative    Narrative:       FOR MEDICAL PURPOSES ONLY  IF CONFIRMATION NEEDED PLEASE CONTACT THE LAB WITHIN 5 DAYS    Drug Screen Cutoff Levels:  AMPHETAMINE/METHAMPHETAMINES  1000 ng/mL  BARBITURATES     200 ng/mL  BENZODIAZEPINES     200 ng/mL  COCAINE      300 ng/mL  METHADONE      300 ng/mL  OPIATES      300 ng/mL  PHENCYCLIDINE     25 ng/mL  THC       50 ng/mL    UA (URINE) with reflex to Microscopic [[id number]] Collected:  02/10/19 0033    Lab Status:  Final result Specimen:  Urine Updated:  02/10/19 0328     Color, UA Light Yellow     Clarity, UA Clear     Specific Gravity, UA 1 010     pH, UA 7 0     Leukocytes, UA Negative     Nitrite, UA Negative     Protein, UA Negative mg/dl      Glucose, UA Negative mg/dl      Ketones, UA Negative mg/dl      Urobilinogen, UA 0 2 E U /dl      Bilirubin, UA Negative     Blood, UA Negative    Ethanol [[id number]]  (Normal) Collected:  02/10/19 0033    Lab Status:  Final result Specimen:  Blood Updated:  02/10/19 0326     Ethanol Lvl <3 mg/dL     Comprehensive metabolic panel [[id number]]  (Abnormal) Collected:  02/10/19 0033    Lab Status:  Final result Specimen:  Blood Updated:  02/10/19 0325     Sodium 142 mmol/L      Potassium 3 0 mmol/L      Chloride 103 mmol/L      CO2 27 mmol/L      ANION GAP 12 mmol/L      BUN 8 mg/dL      Creatinine 1 04 mg/dL      Glucose 99 mg/dL      Calcium 9 8 mg/dL      AST 19 U/L ALT 53 U/L      Alkaline Phosphatase 85 U/L      Total Protein 8 0 g/dL      Albumin 4 8 g/dL      Total Bilirubin 0 92 mg/dL      eGFR 100 ml/min/1 73sq m     Narrative:       National Kidney Disease Education Program recommendations are as follows:  GFR calculation is accurate only with a steady state creatinine  Chronic Kidney disease less than 60 ml/min/1 73 sq  meters  Kidney failure less than 15 ml/min/1 73 sq  meters  Troponin I [[id number]]  (Normal) Collected:  02/10/19 0033    Lab Status:  Final result Specimen:  Blood Updated:  02/10/19 0324     Troponin I <0 02 ng/mL     CBC and differential [[id number]]  (Abnormal) Collected:  02/10/19 0033    Lab Status:  Final result Specimen:  Blood Updated:  02/10/19 0317     WBC 13 88 Thousand/uL      RBC 5 80 Million/uL      Hemoglobin 18 4 g/dL      Hematocrit 51 7 %      MCV 89 fL      MCH 31 7 pg      MCHC 35 6 g/dL      RDW 12 1 %      MPV 10 2 fL      Platelets 597 Thousands/uL      nRBC 0 /100 WBCs      Neutrophils Relative 82 %      Immat GRANS % 0 %      Lymphocytes Relative 13 %      Monocytes Relative 5 %      Eosinophils Relative 0 %      Basophils Relative 0 %      Neutrophils Absolute 11 32 Thousands/µL      Immature Grans Absolute 0 03 Thousand/uL      Lymphocytes Absolute 1 77 Thousands/µL      Monocytes Absolute 0 66 Thousand/µL      Eosinophils Absolute 0 06 Thousand/µL      Basophils Absolute 0 04 Thousands/µL                  No orders to display              Procedures  Procedures       Phone Contacts  ED Phone Contact    ED Course                               MDM  Number of Diagnoses or Management Options     Amount and/or Complexity of Data Reviewed  Clinical lab tests: ordered and reviewed  Tests in the radiology section of CPT®: ordered and reviewed    Risk of Complications, Morbidity, and/or Mortality  Presenting problems: moderate  General comments: Pt  Stable for outpt   Follow up        Disposition  Final diagnoses:   None     ED Disposition     ED Disposition Condition Date/Time Comment    Discharge  Sun Feb 10, 2019  3:00 AM Herminia Olson discharge to home/self care  Condition at discharge: Stable        Follow-up Information    None         Discharge Medication List as of 2/10/2019  3:01 AM      CONTINUE these medications which have NOT CHANGED    Details   HYDROcodone-acetaminophen (NORCO) 5-325 mg per tablet Take 1 tablet by mouth every 4 (four) hours as needed for pain for up to 15 doses Max Daily Amount: 6 tablets, Starting Thu 1/10/2019, Print      sertraline (ZOLOFT) 25 mg tablet Take 25 mg by mouth daily Taking 1 5 tablets daily , Starting Mon 12/4/2017, Historical Med      docusate sodium (COLACE) 100 mg capsule Take 1 capsule (100 mg total) by mouth 2 (two) times a day for 30 days, Starting Thu 1/10/2019, Until Sat 2/9/2019, Print           No discharge procedures on file      ED Provider  Electronically Signed by           Trell Milian MD  02/12/19 6361

## 2019-02-11 LAB
ATRIAL RATE: 115 BPM
QRS AXIS: 84 DEGREES
QRSD INTERVAL: 74 MS
QT INTERVAL: 330 MS
QTC INTERVAL: 444 MS
T WAVE AXIS: 55 DEGREES
VENTRICULAR RATE: 109 BPM

## 2019-02-11 PROCEDURE — 93010 ELECTROCARDIOGRAM REPORT: CPT | Performed by: INTERNAL MEDICINE

## 2019-02-13 ENCOUNTER — OFFICE VISIT (OUTPATIENT)
Dept: INTERNAL MEDICINE CLINIC | Facility: CLINIC | Age: 24
End: 2019-02-13
Payer: MEDICARE

## 2019-02-13 VITALS
DIASTOLIC BLOOD PRESSURE: 94 MMHG | HEART RATE: 100 BPM | BODY MASS INDEX: 22.75 KG/M2 | OXYGEN SATURATION: 97 % | TEMPERATURE: 97.8 F | HEIGHT: 72 IN | SYSTOLIC BLOOD PRESSURE: 144 MMHG | WEIGHT: 168 LBS
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
## 2019-02-13 DIAGNOSIS — R00.2 PALPITATION: ICD-10-CM

## 2019-02-13 DIAGNOSIS — E87.6 HYPOKALEMIA: ICD-10-CM

## 2019-02-13 DIAGNOSIS — R10.30 LOWER ABDOMINAL PAIN: ICD-10-CM
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
## 2019-02-13 PROCEDURE — 99202 OFFICE O/P NEW SF 15 MIN: CPT | Performed by: INTERNAL MEDICINE

## 2019-02-13 RX ORDER — POTASSIUM CHLORIDE 20 MEQ/1
20 TABLET, EXTENDED RELEASE ORAL DAILY
Qty: 7 TABLET | Refills: 0 | Status: SHIPPED | OUTPATIENT
Start: 2019-02-13 | End: 2019-05-08 | Stop reason: ALTCHOICE

## 2019-02-13 NOTE — PROGRESS NOTES
Assessment/Plan:    Bug bite  Healing well  Continue with neosporin for 3-additional days, then stop  Defer on oral antibiotics at this time  Abdominal pain  Stable  Continue to monitor  Palpitation  May be attributed to hypokalemia  Will start potassium supplementation, 20 mEq daily  Recheck BMP in 1-week  Diagnoses and all orders for this visit:    Bug bite, subsequent encounter    Lower abdominal pain    Hypokalemia  -     potassium chloride (K-DUR,KLOR-CON) 20 mEq tablet; Take 1 tablet (20 mEq total) by mouth daily  -     Basic metabolic panel; Future    Palpitation  -     potassium chloride (K-DUR,KLOR-CON) 20 mEq tablet; Take 1 tablet (20 mEq total) by mouth daily  -     Basic metabolic panel; Future          Subjective:      Patient ID: Gricel Rivera is a 24 y o  male     24 y o  Male presents for evaluation of rash and palpitations  He is accompanied by his , Álvaro Scales  The rash began 9 days ago and is located at his L elbow  He first noticed itching  4 days later, the skin color changed to red and then dark purple  Pt used neosporin which provided moderate relief  There was no drainage from the rash or pain in the area  He denies injury or bug bites, though there were bugs (pt states they were "Asian lady beetles")  in his apartment which were exterminated 1 week ago  5 days ago he began having palpitations  He notes episodes of a racing heartbeat and occasional skipped beats  The episodes occur approximately 3 times per day, last 20-60 minutes, and resolve on their own  Nothing in particular brings them on or causes them to resolve  Pt notes having chest pain which he describes as tightness during the episodes, as well as shortness of breath  He occasionaly feels sweats and anxiety during these episodes  Potassium during ER visit on 2/9/2019 was 3 0 and 3 2 on 2/10/2019's ER visit    5 days ago, pt began nausea, vomiting, and diarrhea   Pt went to the ED for evaluation 3 and 4 days ago, and he was given Reglan at that time, which provided relief  He had 3-4 watery bowel movements a day along with the nausea and vomiting, which resolved 3 days ago  For the past 2 days he has had RLQ abdominal pain  Pt mentions that he had his appendix removed 5 weeks ago, and the surgery went well with no complications  Pt mentions that his blood pressure is currently high  It was measured as 144/94  His blood pressure normally runs 120s/80s  The following portions of the patient's history were reviewed and updated as appropriate: allergies, current medications, past family history, past medical history, past social history, past surgical history and problem list     Review of Systems   Constitutional: Negative for activity change, appetite change, chills, diaphoresis, fatigue and fever  HENT: Negative  Negative for congestion, postnasal drip, rhinorrhea, sinus pressure, sinus pain, sneezing and sore throat  Eyes: Negative for visual disturbance  Respiratory: Positive for shortness of breath  Negative for apnea, cough, choking, chest tightness and wheezing  Cardiovascular: Positive for chest pain and palpitations  Negative for leg swelling  Gastrointestinal: Positive for diarrhea, nausea and vomiting  Negative for abdominal distention, abdominal pain, anal bleeding, blood in stool and constipation  Endocrine: Negative for cold intolerance and heat intolerance  Genitourinary: Negative for difficulty urinating, dysuria, frequency and hematuria  Musculoskeletal: Negative  Skin: Positive for rash  Neurological: Positive for headaches  Negative for dizziness, weakness, light-headedness and numbness  No loss of consciousness   Hematological: Negative for adenopathy  Psychiatric/Behavioral: Negative for agitation, sleep disturbance and suicidal ideas  All other systems reviewed and are negative          Objective:       /94 (BP Location: Left arm, Patient Position: Sitting, Cuff Size: Adult)   Pulse 100   Temp 97 8 °F (36 6 °C) (Oral)   Ht 5' 11 5" (1 816 m)   Wt 76 2 kg (168 lb)   SpO2 97% Comment: room air  BMI 23 10 kg/m²          Physical Exam   Constitutional: He is oriented to person, place, and time  He appears well-developed and well-nourished  No distress  HENT:   Head: Normocephalic and atraumatic  Eyes: Pupils are equal, round, and reactive to light  Conjunctivae and EOM are normal  Right eye exhibits no discharge  Left eye exhibits no discharge  No scleral icterus  Neck: Normal range of motion  Neck supple  No JVD present  No thyromegaly present  Cardiovascular: Normal rate, regular rhythm, normal heart sounds and intact distal pulses  Exam reveals no gallop and no friction rub  No murmur heard  Pulmonary/Chest: Effort normal and breath sounds normal  No respiratory distress  He has no wheezes  He has no rales  He exhibits no tenderness  Abdominal: Soft  Bowel sounds are normal  He exhibits no distension and no mass  There is no tenderness  There is no rebound and no guarding  Well-healing surgical incision sites   Musculoskeletal: Normal range of motion  He exhibits no edema, tenderness or deformity  Lymphadenopathy:     He has no cervical adenopathy  Neurological: He is alert and oriented to person, place, and time  He has normal reflexes  No cranial nerve deficit  Coordination normal    Skin: Skin is warm and dry  No rash noted  He is not diaphoretic  No erythema  No pallor  Mild erythema over the L elbow with some 1mm papules   Psychiatric: He has a normal mood and affect   His behavior is normal  Judgment and thought content normal

## 2019-02-13 NOTE — ASSESSMENT & PLAN NOTE
May be attributed to hypokalemia  Will start potassium supplementation, 20 mEq daily  Recheck BMP in 1-week

## 2019-02-13 NOTE — ASSESSMENT & PLAN NOTE
Healing well  Continue with neosporin for 3-additional days, then stop  Defer on oral antibiotics at this time

## 2019-02-13 NOTE — PROGRESS NOTES
MEDICAL STUDENT  Inpatient Progress Note for TRAINING ONLY  Not Part of Legal Medical Record       Progress Note - Claudia Coello 24 y o  male MRN: [id number]    Unit/Bed#:  Encounter: [id number]      Assessment:  ***    Plan:  ***    Subjective:   ***    Objective:     Vitals: Blood pressure 144/94, pulse 100, temperature 97 8 °F (36 6 °C), temperature source Oral, height 5' 11 5" (1 816 m), weight 76 2 kg (168 lb), SpO2 97 %  ,Body mass index is 23 1 kg/m²      [unfilled]    Physical Exam: {Exam, Complete:83351}     Invasive Devices          None          Lab, Imaging and other studies: {Results Review Statement:63499}  VTE Pharmacologic Prophylaxis: {Pharmacologic VTE Prophylaxis:929554116}  VTE Mechanical Prophylaxis: {Mechanical VTE Prophylaxis:15266}

## 2019-02-20 ENCOUNTER — CLINICAL SUPPORT (OUTPATIENT)
Dept: INTERNAL MEDICINE CLINIC | Facility: CLINIC | Age: 24
End: 2019-02-20
Payer: MEDICARE

## 2019-02-20 DIAGNOSIS — E87.6 HYPOKALEMIA: Primary | ICD-10-CM

## 2019-02-20 DIAGNOSIS — Z11.59 ENCOUNTER FOR HEPATITIS C VIRUS SCREENING TEST FOR HIGH RISK PATIENT: ICD-10-CM

## 2019-02-20 DIAGNOSIS — Z91.89 ENCOUNTER FOR HEPATITIS C VIRUS SCREENING TEST FOR HIGH RISK PATIENT: ICD-10-CM

## 2019-02-20 LAB
ANION GAP SERPL CALCULATED.3IONS-SCNC: 9 MMOL/L (ref 4–13)
BUN SERPL-MCNC: 7 MG/DL (ref 5–25)
CALCIUM SERPL-MCNC: 9.7 MG/DL (ref 8.3–10.1)
CHLORIDE SERPL-SCNC: 102 MMOL/L (ref 100–108)
CO2 SERPL-SCNC: 28 MMOL/L (ref 21–32)
CREAT SERPL-MCNC: 0.98 MG/DL (ref 0.6–1.3)
GFR SERPL CREATININE-BSD FRML MDRD: 107 ML/MIN/1.73SQ M
GLUCOSE P FAST SERPL-MCNC: 89 MG/DL (ref 65–99)
POTASSIUM SERPL-SCNC: 4.2 MMOL/L (ref 3.5–5.3)
SODIUM SERPL-SCNC: 139 MMOL/L (ref 136–145)

## 2019-02-20 PROCEDURE — 36415 COLL VENOUS BLD VENIPUNCTURE: CPT

## 2019-02-20 PROCEDURE — 80048 BASIC METABOLIC PNL TOTAL CA: CPT | Performed by: INTERNAL MEDICINE

## 2019-02-20 PROCEDURE — 86803 HEPATITIS C AB TEST: CPT | Performed by: INTERNAL MEDICINE

## 2019-02-21 LAB — HCV AB SER QL: NORMAL

## 2019-03-06 ENCOUNTER — OFFICE VISIT (OUTPATIENT)
Dept: INTERNAL MEDICINE CLINIC | Facility: CLINIC | Age: 24
End: 2019-03-06
Payer: MEDICARE

## 2019-03-06 VITALS
BODY MASS INDEX: 21.86 KG/M2 | DIASTOLIC BLOOD PRESSURE: 100 MMHG | HEART RATE: 72 BPM | SYSTOLIC BLOOD PRESSURE: 158 MMHG | OXYGEN SATURATION: 98 % | TEMPERATURE: 98.4 F | HEIGHT: 72 IN | WEIGHT: 161.4 LBS

## 2019-03-06 DIAGNOSIS — I10 ESSENTIAL HYPERTENSION: ICD-10-CM

## 2019-03-06 DIAGNOSIS — E87.6 HYPOKALEMIA: ICD-10-CM

## 2019-03-06 DIAGNOSIS — Z11.59 ENCOUNTER FOR HEPATITIS C VIRUS SCREENING TEST FOR HIGH RISK PATIENT: ICD-10-CM

## 2019-03-06 DIAGNOSIS — Z91.89 ENCOUNTER FOR HEPATITIS C VIRUS SCREENING TEST FOR HIGH RISK PATIENT: ICD-10-CM

## 2019-03-06 DIAGNOSIS — F95.2 TOURETTE'S SYNDROME: ICD-10-CM

## 2019-03-06 DIAGNOSIS — R00.2 PALPITATION: Primary | ICD-10-CM

## 2019-03-06 DIAGNOSIS — R42 DIZZINESS: ICD-10-CM

## 2019-03-06 DIAGNOSIS — F20.9 SCHIZOPHRENIA, UNSPECIFIED TYPE (HCC): ICD-10-CM

## 2019-03-06 PROCEDURE — 99214 OFFICE O/P EST MOD 30 MIN: CPT | Performed by: NURSE PRACTITIONER

## 2019-03-06 RX ORDER — LISINOPRIL 5 MG/1
5 TABLET ORAL DAILY
Qty: 30 TABLET | Refills: 0 | Status: SHIPPED | OUTPATIENT
Start: 2019-03-06 | End: 2019-03-20 | Stop reason: SDUPTHER

## 2019-03-06 RX ORDER — FLUTICASONE PROPIONATE 50 MCG
1 SPRAY, SUSPENSION (ML) NASAL DAILY
Qty: 1 BOTTLE | Refills: 0 | Status: SHIPPED | OUTPATIENT
Start: 2019-03-06 | End: 2019-05-08 | Stop reason: ALTCHOICE

## 2019-03-06 NOTE — ASSESSMENT & PLAN NOTE
Patient currently not taking his Zoloft for about 2 weeks, I discussed with patient the importance of not abruptly discontinuing this medication, this medication needs to be tapered  Patient's caregiver does state that patient has history of noncompliance with medications, I advised patient and caregiver to get a pillbox to help patient take his medications consistently

## 2019-03-06 NOTE — ASSESSMENT & PLAN NOTE
Patient potassium normalized at 4 2, will continue to monitor  Patient continues with palpations which he was worked up in the [place] [id number] for  Will get 24 hour Holter monitor and follow-up with patient in 2 weeks

## 2019-03-06 NOTE — PROGRESS NOTES
Assessment/Plan:    Hypertension  Patient BP elevated at past two appointments, patient is a smoker and has a sedentary lifestyle  Will start patient on low dose lisinopril 5mg tablet daily  Advised patient to change positions slowly  Continue to monitor blood pressure at home  Goal BP is < 130/80  Contact our office for consistent elevations  Recommend low sodium diet  Exercise 30 minutes three times a week as tolerated  Recommend yearly eye exam   Will follow-up with patient in two weeks  Schizophrenia [facility])  Patient currently not taking his Zoloft for about 2 weeks, I discussed with patient the importance of not abruptly discontinuing this medication, this medication needs to be tapered  Patient's caregiver does state that patient has history of noncompliance with medications, I advised patient and caregiver to get a pillbox to help patient take his medications consistently  Palpitation  Patient potassium normalized at 4 2, will continue to monitor  Patient continues with palpations which he was worked up in the [place] [id number] for  Will get 24 hour Holter monitor and follow-up with patient in 2 weeks  Hypokalemia  Patient's most recent potassium level 4 2  Encounter for hepatitis C virus screening test for high risk patient  Hepatitis-C nonreactive  Diagnoses and all orders for this visit:    Palpitation  -     Holter monitor - 24 hour; Future    Essential hypertension  -     Lipid panel  -     lisinopril (ZESTRIL) 5 mg tablet; Take 1 tablet (5 mg total) by mouth daily    Dizziness  -     fluticasone (FLONASE) 50 mcg/act nasal spray; 1 spray into each nostril daily    Tourette's syndrome    Schizophrenia, unspecified type ([facility] [address] )    Hypokalemia    Encounter for hepatitis C virus screening test for high risk patient          Subjective:      Patient ID: Felicitas Boateng is a 21 y o  male  Patient presents today to establish care with our practice     Patient had complaints of palpitations at previous appointment, patient was noted to be hypokalemic, and was started on potassium supplementation, repeat potassium was done and is now normal at 4 2  Has been eating more foods with potassium  Originally he stated that his palpitations had gotten better however they have now returned  He describes them as a flip-flopping sensation  Patient states that his palpitations do not correlate with anything  The patient was noted to have elevated blood pressure his previous 2 visits  With further questioning I found out the patient had stopped his Zoloft approximately 2 weeks ago, and he is not compliant with medications  Patient does have a care worker that works with him about 10 hours a week which he with him to his appointment today, her name is José Miguel Encinas  Patient also states he has complaints of dizziness and lightheadedness for approximately 1 week, he states that this has only happened about 1-2 times  He denies getting lightheaded with change of position  Patient denies any changes to his diet, he states he feels well hydrated  Patient currently follows psychiatry  Tobacco abuse-trying to quit, since age 12years old (1-2 packs a day)  Alcohol use- 2 years no alcohol  Illicit drug use-denies    Denies exercise, very sedentary lifestyle, plays video games most the day  Dizziness   This is a new (about 2-3 times) problem  Episode onset: 6-7 days  Pertinent negatives include no abdominal pain, arthralgias, chest pain, chills, congestion, coughing, diaphoresis, fever, headaches, nausea, neck pain, numbness, rash, sore throat, vomiting or weakness  The following portions of the patient's history were reviewed and updated as appropriate: allergies, current medications, past family history, past medical history, past social history, past surgical history and problem list     Review of Systems   Constitutional: Negative for activity change, appetite change, chills, diaphoresis and fever  HENT: Positive for rhinorrhea  Negative for congestion, ear discharge, ear pain, postnasal drip, sinus pressure, sinus pain and sore throat  Eyes: Negative for pain, discharge, itching and visual disturbance  Respiratory: Negative for cough, chest tightness, shortness of breath and wheezing  Cardiovascular: Negative for chest pain, palpitations and leg swelling  Gastrointestinal: Negative for abdominal pain, blood in stool, constipation, diarrhea, nausea and vomiting  Endocrine: Negative for polydipsia, polyphagia and polyuria  Genitourinary: Negative for difficulty urinating, dysuria, hematuria and urgency  Musculoskeletal: Negative for arthralgias, back pain and neck pain  Skin: Negative for rash and wound  Neurological: Positive for dizziness and light-headedness  Negative for weakness, numbness and headaches           Past Medical History:   Diagnosis Date    Anxiety     Last assessed 10/18/16    Asthma     Last assessed 03/15/13    Intermittent explosive disorder     Oppositional defiant disorder     [facility]     last assessed 12/04/17    Scoliosis          Current Outpatient Medications:     sertraline (ZOLOFT) 25 mg tablet, Take 25 mg by mouth daily Taking 1 5 tablets daily , Disp: , Rfl:     fluticasone (FLONASE) 50 mcg/act nasal spray, 1 spray into each nostril daily, Disp: 1 Bottle, Rfl: 0    lisinopril (ZESTRIL) 5 mg tablet, Take 1 tablet (5 mg total) by mouth daily, Disp: 30 tablet, Rfl: 0    potassium chloride (K-DUR,KLOR-CON) 20 mEq tablet, Take 1 tablet (20 mEq total) by mouth daily (Patient not taking: Reported on 3/6/2019), Disp: 7 tablet, Rfl: 0    No Known Allergies    Social History   Past Surgical History:   Procedure Laterality Date    HERNIA REPAIR      NO PAST SURGERIES      AR LAP,APPENDECTOMY N/A 1/9/2019    Procedure: APPENDECTOMY LAPAROSCOPIC;  Surgeon: Gopal Mckenna MD;  Location: [facility];  Service: General     [facility] N/A 1/9/2019    Procedure: LAPAROSCOPY DIAGNOSTIC;  Surgeon: Alexis Onofre MD;  Location: [facility] OR;  Service: General     Family History   Problem Relation Age of Onset    Diabetes Mother     Bipolar disorder Family     Schizophrenia Family     Diabetes type II Family        Objective:  /100 (BP Location: Left arm, Patient Position: Sitting, Cuff Size: Standard)   Pulse 72   Temp 98 4 °F (36 9 °C)   Ht 6' (1 829 m)   Wt 73 2 kg (161 lb 6 4 oz)   SpO2 98%   BMI 21 89 kg/m²     Recent Results (from the past 1344 hour(s))   Tissue Exam    Collection Time: 01/09/19  4:29 PM   Result Value Ref Range    Case Report       Surgical Pathology Report                         Case: [id number]                                   Authorizing Provider:  Alexis Onofre MD        Collected:           01/09/2019 1629              Ordering Location:     [facility]        Received:            01/09/2019 [address] [facility] Operating Room                                                     Pathologist:           Arthur Harden MD                                                        Specimen:    Appendix                                                                                   Final Diagnosis       A  Appendix, appendectomy:  -  Acute appendicitis with periappendicitis  Additional Information       All controls performed with the immunohistochemical stains reported above reacted appropriately  These tests were developed and their performance characteristics determined by [facility] or [facility]  They may not be cleared or approved by the [place]  Food and Drug Administration  The FDA has determined that such clearance or approval is not necessary  These tests are used for clinical purposes  They should not be regarded as investigational or for research   This laboratory has been approved by CLIA 88, designated as a high-complexity laboratory and is qualified to perform these tests  Gross Description        A  The specimen is received in formalin, labeled with the patient's name and hospital number, and is designated "appendix "  The specimen consists of a vermiform appendix which measures 6 0 cm in length by 0 5 cm in average diameter  The exposed serosal surface is smooth, focally shaggy purple tan gray  The resected margin is stapled, staple line removed and exposed surface inked blue  No perforations are noted  The wall measures up to 0 2 cm in thickness  The lumen contains minimal material   Representative sections  Two cassettes  Note: The estimated total formalin fixation time based upon information provided by the submitting clinician and the standard processing schedule is less than 72 hours                   Jo                                 CBC and differential    Collection Time: 01/10/19  5:51 AM   Result Value Ref Range    WBC 17 37 (H) 4 31 - 10 16 Thousand/uL    RBC 4 61 3 88 - 5 62 Million/uL    Hemoglobin 14 7 12 0 - 17 0 g/dL    Hematocrit 42 1 36 5 - 49 3 %    MCV 91 82 - 98 fL    MCH 31 9 26 8 - 34 3 pg    MCHC 34 9 31 4 - 37 4 g/dL    RDW 12 1 11 6 - 15 1 %    MPV 10 6 8 9 - 12 7 fL    Platelets 171 410 - 989 Thousands/uL    nRBC 0 /100 WBCs    Neutrophils Relative 87 (H) 43 - 75 %    Immat GRANS % 1 0 - 2 %    Lymphocytes Relative 7 (L) 14 - 44 %    Monocytes Relative 5 4 - 12 %    Eosinophils Relative 0 0 - 6 %    Basophils Relative 0 0 - 1 %    Neutrophils Absolute 15 28 (H) 1 85 - 7 62 Thousands/µL    Immature Grans Absolute 0 11 0 00 - 0 20 Thousand/uL    Lymphocytes Absolute 1 18 0 60 - 4 47 Thousands/µL    Monocytes Absolute 0 79 0 17 - 1 22 Thousand/µL    Eosinophils Absolute 0 00 0 00 - 0 61 Thousand/µL    Basophils Absolute 0 01 0 00 - 0 10 Thousands/µL   Comprehensive metabolic panel    Collection Time: 01/10/19  5:51 AM   Result Value Ref Range    Sodium 138 136 - 145 mmol/L Potassium 4 2 3 5 - 5 3 mmol/L    Chloride 103 100 - 108 mmol/L    CO2 25 21 - 32 mmol/L    ANION GAP 10 4 - 13 mmol/L    BUN 11 5 - 25 mg/dL    Creatinine 0 97 0 60 - 1 30 mg/dL    Glucose 136 65 - 140 mg/dL    Calcium 8 6 8 3 - 10 1 mg/dL    AST 11 5 - 45 U/L    ALT 19 12 - 78 U/L    Alkaline Phosphatase 51 46 - 116 U/L    Total Protein 6 4 6 4 - 8 2 g/dL    Albumin 3 6 3 5 - 5 0 g/dL    Total Bilirubin 1 25 (H) 0 20 - 1 00 mg/dL    eGFR 109 ml/min/1 73sq m   CBC and differential    Collection Time: 01/11/19  6:22 AM   Result Value Ref Range    WBC 9 75 4 31 - 10 16 Thousand/uL    RBC 4 48 3 88 - 5 62 Million/uL    Hemoglobin 14 5 12 0 - 17 0 g/dL    Hematocrit 41 4 36 5 - 49 3 %    MCV 92 82 - 98 fL    MCH 32 4 26 8 - 34 3 pg    MCHC 35 0 31 4 - 37 4 g/dL    RDW 12 2 11 6 - 15 1 %    MPV 10 5 8 9 - 12 7 fL    Platelets 039 901 - 430 Thousands/uL    nRBC 0 /100 WBCs    Neutrophils Relative 53 43 - 75 %    Immat GRANS % 0 0 - 2 %    Lymphocytes Relative 39 14 - 44 %    Monocytes Relative 8 4 - 12 %    Eosinophils Relative 0 0 - 6 %    Basophils Relative 0 0 - 1 %    Neutrophils Absolute 5 06 1 85 - 7 62 Thousands/µL    Immature Grans Absolute 0 02 0 00 - 0 20 Thousand/uL    Lymphocytes Absolute 3 82 0 60 - 4 47 Thousands/µL    Monocytes Absolute 0 79 0 17 - 1 22 Thousand/µL    Eosinophils Absolute 0 04 0 00 - 0 61 Thousand/µL    Basophils Absolute 0 02 0 00 - 0 10 Thousands/µL   ECG 12 lead    Collection Time: 02/09/19 11:53 PM   Result Value Ref Range    Ventricular Rate 117 BPM    Atrial Rate 117 BPM    MA Interval 184 ms    QRSD Interval 80 ms    QT Interval 310 ms    QTC Interval 432 ms    P Axis 65 degrees    QRS Axis 87 degrees    T Wave Axis 31 degrees   CBC and differential    Collection Time: 02/10/19 12:33 AM   Result Value Ref Range    WBC 13 88 (H) 4 31 - 10 16 Thousand/uL    RBC 5 80 (H) 3 88 - 5 62 Million/uL    Hemoglobin 18 4 (H) 12 0 - 17 0 g/dL    Hematocrit 51 7 (H) 36 5 - 49 3 %    MCV 89 82 - 98 fL    MCH 31 7 26 8 - 34 3 pg    MCHC 35 6 31 4 - 37 4 g/dL    RDW 12 1 11 6 - 15 1 %    MPV 10 2 8 9 - 12 7 fL    Platelets 053 249 - 368 Thousands/uL    nRBC 0 /100 WBCs    Neutrophils Relative 82 (H) 43 - 75 %    Immat GRANS % 0 0 - 2 %    Lymphocytes Relative 13 (L) 14 - 44 %    Monocytes Relative 5 4 - 12 %    Eosinophils Relative 0 0 - 6 %    Basophils Relative 0 0 - 1 %    Neutrophils Absolute 11 32 (H) 1 85 - 7 62 Thousands/µL    Immature Grans Absolute 0 03 0 00 - 0 20 Thousand/uL    Lymphocytes Absolute 1 77 0 60 - 4 47 Thousands/µL    Monocytes Absolute 0 66 0 17 - 1 22 Thousand/µL    Eosinophils Absolute 0 06 0 00 - 0 61 Thousand/µL    Basophils Absolute 0 04 0 00 - 0 10 Thousands/µL   UA (URINE) with reflex to Microscopic    Collection Time: 02/10/19 12:33 AM   Result Value Ref Range    Color, UA Light Yellow     Clarity, UA Clear     Specific [place], UA 1 010 1 003 - 1 030    pH, UA 7 0 4 5 - 8 0    Leukocytes, UA Negative Negative    Nitrite, UA Negative Negative    Protein, UA Negative Negative mg/dl    Glucose, UA Negative Negative mg/dl    Ketones, UA Negative Negative mg/dl    Urobilinogen, UA 0 2 0 2, 1 0 E U /dl E U /dl    Bilirubin, UA Negative Negative    Blood, UA Negative Negative   Ethanol    Collection Time: 02/10/19 12:33 AM   Result Value Ref Range    Ethanol Lvl <3 0 - 3 mg/dL   Rapid drug screen, urine    Collection Time: 02/10/19 12:33 AM   Result Value Ref Range    Amph/Meth UR Negative Negative    Barbiturate Ur Negative Negative    Benzodiazepine Urine Negative Negative    Cocaine Urine Negative Negative    Methadone Urine Negative Negative    Opiate Urine Negative Negative    PCP Ur Negative Negative    THC Urine Negative Negative   Comprehensive metabolic panel    Collection Time: 02/10/19 12:33 AM   Result Value Ref Range    Sodium 142 136 - 145 mmol/L    Potassium 3 0 (L) 3 5 - 5 3 mmol/L    Chloride 103 100 - 108 mmol/L    CO2 27 21 - 32 mmol/L    ANION GAP 12 4 - 13 mmol/L    BUN 8 5 - 25 mg/dL    Creatinine 1 04 0 60 - 1 30 mg/dL    Glucose 99 65 - 140 mg/dL    Calcium 9 8 8 3 - 10 1 mg/dL    AST 19 5 - 45 U/L    ALT 53 12 - 78 U/L    Alkaline Phosphatase 85 46 - 116 U/L    Total Protein 8 0 6 4 - 8 2 g/dL    Albumin 4 8 3 5 - 5 0 g/dL    Total Bilirubin 0 92 0 20 - 1 00 mg/dL    eGFR 100 ml/min/1 73sq m   Troponin I    Collection Time: 02/10/19 12:33 AM   Result Value Ref Range    Troponin I <0 02 <=0 04 ng/mL   ECG 12 lead    Collection Time: 02/10/19  6:00 PM   Result Value Ref Range    Ventricular Rate 109 BPM    Atrial Rate 115 BPM    DE Interval  ms    QRSD Interval 74 ms    QT Interval 330 ms    QTC Interval 444 ms    P Axis  degrees    QRS Axis 84 degrees    T Wave Axis 55 degrees   CBC and differential    Collection Time: 02/10/19  6:05 PM   Result Value Ref Range    WBC 11 60 (H) 4 31 - 10 16 Thousand/uL    RBC 5 50 3 88 - 5 62 Million/uL    Hemoglobin 17 8 (H) 12 0 - 17 0 g/dL    Hematocrit 49 1 36 5 - 49 3 %    MCV 89 82 - 98 fL    MCH 32 4 26 8 - 34 3 pg    MCHC 36 3 31 4 - 37 4 g/dL    RDW 11 9 11 6 - 15 1 %    MPV 10 0 8 9 - 12 7 fL    Platelets 139 402 - 253 Thousands/uL    nRBC 0 /100 WBCs    Neutrophils Relative 80 (H) 43 - 75 %    Immat GRANS % 0 0 - 2 %    Lymphocytes Relative 15 14 - 44 %    Monocytes Relative 5 4 - 12 %    Eosinophils Relative 0 0 - 6 %    Basophils Relative 0 0 - 1 %    Neutrophils Absolute 9 14 (H) 1 85 - 7 62 Thousands/µL    Immature Grans Absolute 0 04 0 00 - 0 20 Thousand/uL    Lymphocytes Absolute 1 72 0 60 - 4 47 Thousands/µL    Monocytes Absolute 0 61 0 17 - 1 22 Thousand/µL    Eosinophils Absolute 0 05 0 00 - 0 61 Thousand/µL    Basophils Absolute 0 04 0 00 - 0 10 Thousands/µL   D-Dimer    Collection Time: 02/10/19  6:05 PM   Result Value Ref Range    D-Dimer, Quant 279 <500 ng/ml (FEU)   Comprehensive metabolic panel    Collection Time: 02/10/19  6:05 PM   Result Value Ref Range    Sodium 141 136 - 145 mmol/L    Potassium 3 2 (L) 3 5 - 5 3 mmol/L    Chloride 104 100 - 108 mmol/L    CO2 26 21 - 32 mmol/L    ANION GAP 11 4 - 13 mmol/L    BUN 9 5 - 25 mg/dL    Creatinine 1 06 0 60 - 1 30 mg/dL    Glucose 95 65 - 140 mg/dL    Calcium 9 9 8 3 - 10 1 mg/dL    AST 19 5 - 45 U/L    ALT 48 12 - 78 U/L    Alkaline Phosphatase 76 46 - 116 U/L    Total Protein 7 6 6 4 - 8 2 g/dL    Albumin 4 6 3 5 - 5 0 g/dL    Total Bilirubin 2 09 (H) 0 20 - 1 00 mg/dL    eGFR 98 ml/min/1 73sq m   Protime-INR    Collection Time: 02/10/19  6:05 PM   Result Value Ref Range    Protime 14 4 (H) 11 8 - 14 2 seconds    INR 1 11 0 86 - 1 17   APTT    Collection Time: 02/10/19  6:05 PM   Result Value Ref Range    PTT 28 26 - 38 seconds   Troponin I    Collection Time: 02/10/19  6:05 PM   Result Value Ref Range    Troponin I <0 02 <=0 04 ng/mL   TSH, 3rd generation with Free T4 reflex    Collection Time: 02/10/19  6:05 PM   Result Value Ref Range    TSH 3RD GENERATON 1 378 0 358 - 3 740 uIU/mL   Hepatitis C antibody    Collection Time: 02/20/19  9:03 AM   Result Value Ref Range    Hepatitis C Ab Non-reactive Non-reactive   Basic metabolic panel    Collection Time: 02/20/19  9:03 AM   Result Value Ref Range    Sodium 139 136 - 145 mmol/L    Potassium 4 2 3 5 - 5 3 mmol/L    Chloride 102 100 - 108 mmol/L    CO2 28 21 - 32 mmol/L    ANION GAP 9 4 - 13 mmol/L    BUN 7 5 - 25 mg/dL    Creatinine 0 98 0 60 - 1 30 mg/dL    Glucose, Fasting 89 65 - 99 mg/dL    Calcium 9 7 8 3 - 10 1 mg/dL    eGFR 107 ml/min/1 73sq m            Physical Exam   Constitutional: He is oriented to person, place, and time  He appears well-developed and well-nourished  No distress  HENT:   Head: Normocephalic and atraumatic  Right Ear: External ear normal  Tympanic membrane is bulging  Tympanic membrane is not erythematous  A middle ear effusion is present  Left Ear: External ear normal  Tympanic membrane is bulging  Tympanic membrane is not erythematous  A middle ear effusion is present     Nose: Nose normal    Mouth/Throat: Oropharynx is clear and moist  No oropharyngeal exudate  Eyes: Pupils are equal, round, and reactive to light  Conjunctivae and EOM are normal  Right eye exhibits no discharge  Left eye exhibits no discharge  Neck: Normal range of motion  Neck supple  No thyromegaly present  Cardiovascular: Normal rate, regular rhythm, normal heart sounds and intact distal pulses  Exam reveals no gallop and no friction rub  No murmur heard  Pulmonary/Chest: Effort normal and breath sounds normal  No stridor  No respiratory distress  He has no wheezes  He has no rales  Abdominal: Soft  Bowel sounds are normal  He exhibits no distension  There is no tenderness  Lymphadenopathy:     He has no cervical adenopathy  Neurological: He is alert and oriented to person, place, and time  Skin: Skin is warm and dry  No rash noted  He is not diaphoretic  No erythema  Psychiatric: He has a normal mood and affect   His behavior is normal  Judgment and thought content normal

## 2019-03-06 NOTE — ASSESSMENT & PLAN NOTE
Patient BP elevated at past two appointments, patient is a smoker and has a sedentary lifestyle  Will start patient on low dose lisinopril 5mg tablet daily  Advised patient to change positions slowly  Continue to monitor blood pressure at home  Goal BP is < 130/80  Contact our office for consistent elevations  Recommend low sodium diet  Exercise 30 minutes three times a week as tolerated  Recommend yearly eye exam   Will follow-up with patient in two weeks

## 2019-03-11 ENCOUNTER — HOSPITAL ENCOUNTER (OUTPATIENT)
Dept: NON INVASIVE DIAGNOSTICS | Facility: HOSPITAL | Age: 24
Discharge: HOME | End: 2019-03-11
Payer: MEDICARE

## 2019-03-11 DIAGNOSIS — R00.2 PALPITATION: ICD-10-CM

## 2019-03-11 PROCEDURE — 93226 XTRNL ECG REC<48 HR SCAN A/R: CPT

## 2019-03-11 PROCEDURE — 93225 XTRNL ECG REC<48 HRS REC: CPT

## 2019-03-14 PROCEDURE — 93227 XTRNL ECG REC<48 HR R&I: CPT | Performed by: INTERNAL MEDICINE

## 2019-03-20 ENCOUNTER — OFFICE VISIT (OUTPATIENT)
Dept: INTERNAL MEDICINE CLINIC | Facility: CLINIC | Age: 24
End: 2019-03-20
Payer: MEDICARE

## 2019-03-20 VITALS
OXYGEN SATURATION: 97 % | BODY MASS INDEX: 22.27 KG/M2 | HEART RATE: 79 BPM | DIASTOLIC BLOOD PRESSURE: 78 MMHG | WEIGHT: 164.4 LBS | SYSTOLIC BLOOD PRESSURE: 126 MMHG | HEIGHT: 72 IN | TEMPERATURE: 97.5 F

## 2019-03-20 DIAGNOSIS — I10 ESSENTIAL HYPERTENSION: Primary | ICD-10-CM

## 2019-03-20 DIAGNOSIS — F20.9 SCHIZOPHRENIA, UNSPECIFIED TYPE (HCC): ICD-10-CM

## 2019-03-20 DIAGNOSIS — E87.6 HYPOKALEMIA: ICD-10-CM

## 2019-03-20 DIAGNOSIS — R00.2 PALPITATION: ICD-10-CM

## 2019-03-20 PROCEDURE — 99213 OFFICE O/P EST LOW 20 MIN: CPT | Performed by: NURSE PRACTITIONER

## 2019-03-20 RX ORDER — SERTRALINE HYDROCHLORIDE 25 MG/1
25 TABLET, FILM COATED ORAL DAILY
Qty: 30 TABLET | Refills: 0 | Status: SHIPPED | OUTPATIENT
Start: 2019-03-20 | End: 2019-10-09 | Stop reason: ALTCHOICE

## 2019-03-20 RX ORDER — LISINOPRIL 5 MG/1
5 TABLET ORAL DAILY
Qty: 30 TABLET | Refills: 0 | Status: SHIPPED | OUTPATIENT
Start: 2019-03-20 | End: 2019-06-25 | Stop reason: SDUPTHER

## 2019-03-20 NOTE — ASSESSMENT & PLAN NOTE
Patient's potassium normalized at 4 2, will continue to monitor his potassium level  Patient did do 24 hour Holter monitor with no significant findings  Patient states that with starting the blood pressure medication, and restarting his Zoloft he noticed that the palpitations in his heart have significantly decreased

## 2019-03-20 NOTE — PROGRESS NOTES
Assessment/Plan:    Hypertension  Patient is to continue to take lisinopril, patient's blood pressure has much improved  Continue to monitor blood pressure at home  Goal BP is < 130/80  Contact our office for consistent elevations  Recommend low sodium diet  Exercise 30 minutes three times a week as tolerated  Recommend yearly eye exam       Schizophrenia [facility])  Patient currently states he has been taking his Zoloft consistently, I discussed the importance of not abruptly discontinuing this medication  I continue to advised patient to get a pillbox to help patient remember to take his medications consistently  Patient currently follows psychiatry  Hypokalemia  Patient's most recent potassium level 4 2  Palpitation  Patient's potassium normalized at 4 2, will continue to monitor his potassium level  Patient did do 24 hour Holter monitor with no significant findings  Patient states that with starting the blood pressure medication, and restarting his Zoloft he noticed that the palpitations in his heart have significantly decreased  Diagnoses and all orders for this visit:    Essential hypertension  -     CBC and differential; Future  -     Comprehensive metabolic panel; Future  -     lisinopril (ZESTRIL) 5 mg tablet; Take 1 tablet (5 mg total) by mouth daily    Schizophrenia, unspecified type (HCC)  -     sertraline (ZOLOFT) 25 mg tablet; Take 1 tablet (25 mg total) by mouth daily    Hypokalemia    Palpitation          Subjective:      Patient ID: Josie Miller is a 21 y o  male  Patient presents today to follow-up on palpitations, patient states that since he has restarted his Zoloft and started lisinopril his palpitations have been much less  Patient does report that since his previous visit he has stop smoking      Note from previous appointment:  Patient had complaints of palpitations at previous appointment, patient was noted to be hypokalemic, and was started on potassium supplementation, repeat potassium was done and is now normal at 4 2  Has been eating more foods with potassium  Originally he stated that his palpitations had gotten better however they have now returned  He describes them as a "flip-flopping" sensation  Patient states that his palpitations do not correlate with anything  The patient was noted to have elevated blood pressure his previous 2 visits  With further questioning I found out the patient had stopped his Zoloft approximately 2 weeks ago, and he is not compliant with medications  Patient does have a care worker that works with him about 10 hours a week which he with him to his appointment today, her name is Eliel Wootennt  Patient also states he has complaints of dizziness and lightheadedness for approximately 1 week, he states that this has only happened about 1-2 times  He denies getting lightheaded with change of position  Patient denies any changes to his diet, he states he feels well hydrated  Patient currently follows psychiatry  Quit smoking about 12 days ago  Tobacco abuse-trying to quit, since age 12years old (1-2 packs a day)  Alcohol use- 2 years no alcohol  Illicit drug use-denies    Denies exercise, very sedentary lifestyle, plays video games most the day  The following portions of the patient's history were reviewed and updated as appropriate: allergies, current medications, past family history, past medical history, past social history, past surgical history and problem list     Review of Systems   Constitutional: Negative for activity change, appetite change, chills, diaphoresis and fever  HENT: Negative for congestion, ear discharge, ear pain, postnasal drip, rhinorrhea, sinus pressure, sinus pain and sore throat  Eyes: Negative for pain, discharge, itching and visual disturbance  Respiratory: Negative for cough, chest tightness, shortness of breath and wheezing      Cardiovascular: Negative for chest pain, palpitations and leg swelling  Gastrointestinal: Negative for abdominal pain, constipation, diarrhea, nausea and vomiting  Endocrine: Negative for polydipsia, polyphagia and polyuria  Genitourinary: Negative for difficulty urinating, dysuria and urgency  Musculoskeletal: Negative for arthralgias, back pain and neck pain  Skin: Negative for rash and wound  Neurological: Negative for dizziness, weakness, numbness and headaches           Past Medical History:   Diagnosis Date    Anxiety     Last assessed 10/18/16    Asthma     Last assessed 03/15/13    Intermittent explosive disorder     Oppositional defiant disorder     [facility]     last assessed 12/04/17    Scoliosis          Current Outpatient Medications:     fluticasone (FLONASE) 50 mcg/act nasal spray, 1 spray into each nostril daily, Disp: 1 Bottle, Rfl: 0    lisinopril (ZESTRIL) 5 mg tablet, Take 1 tablet (5 mg total) by mouth daily, Disp: 30 tablet, Rfl: 0    sertraline (ZOLOFT) 25 mg tablet, Take 1 tablet (25 mg total) by mouth daily, Disp: 30 tablet, Rfl: 0    potassium chloride (K-DUR,KLOR-CON) 20 mEq tablet, Take 1 tablet (20 mEq total) by mouth daily (Patient not taking: Reported on 3/6/2019), Disp: 7 tablet, Rfl: 0    No Known Allergies    Social History   Past Surgical History:   Procedure Laterality Date    HERNIA REPAIR      NO PAST SURGERIES      CT LAP,APPENDECTOMY N/A 1/9/2019    Procedure: [place];  Surgeon: Rito Wilson MD;  [facility]: [facility];  Service: General    CT LAP,DIAGNOSTIC ABDOMEN N/A 1/9/2019    Procedure: LAPAROSCOPY DIAGNOSTIC;  Surgeon: Rito Wilson MD;  [facility];  Service: General     Family History   Problem Relation Age of Onset    Diabetes Mother     Bipolar disorder Family     Schizophrenia Family     Diabetes type II Family        Objective:  /78 (BP Location: Left arm, Patient Position: Sitting, Cuff Size: Standard)   Pulse 79   Temp 97 5 °F (36 4 °C) (Oral)   Ht 6' (1 829 m)   Wt 74 6 kg (164 lb 6 4 oz)   SpO2 97%   BMI 22 30 kg/m²     Recent Results (from the past 1344 hour(s))   ECG 12 lead    Collection Time: 02/09/19 11:53 PM   Result Value Ref Range    Ventricular Rate 117 BPM    Atrial Rate 117 BPM    FL Interval 184 ms    QRSD Interval 80 ms    QT Interval 310 ms    QTC Interval 432 ms    P Axis 65 degrees    QRS Axis 87 degrees    T Wave Axis 31 degrees   CBC and differential    Collection Time: 02/10/19 12:33 AM   Result Value Ref Range    WBC 13 88 (H) 4 31 - 10 16 Thousand/uL    RBC 5 80 (H) 3 88 - 5 62 Million/uL    Hemoglobin 18 4 (H) 12 0 - 17 0 g/dL    Hematocrit 51 7 (H) 36 5 - 49 3 %    MCV 89 82 - 98 fL    MCH 31 7 26 8 - 34 3 pg    MCHC 35 6 31 4 - 37 4 g/dL    RDW 12 1 11 6 - 15 1 %    MPV 10 2 8 9 - 12 7 fL    Platelets 925 924 - 809 Thousands/uL    nRBC 0 /100 WBCs    Neutrophils Relative 82 (H) 43 - 75 %    Immat GRANS % 0 0 - 2 %    Lymphocytes Relative 13 (L) 14 - 44 %    Monocytes Relative 5 4 - 12 %    Eosinophils Relative 0 0 - 6 %    Basophils Relative 0 0 - 1 %    Neutrophils Absolute 11 32 (H) 1 85 - 7 62 Thousands/µL    Immature Grans Absolute 0 03 0 00 - 0 20 Thousand/uL    Lymphocytes Absolute 1 77 0 60 - 4 47 Thousands/µL    Monocytes Absolute 0 66 0 17 - 1 22 Thousand/µL    Eosinophils Absolute 0 06 0 00 - 0 61 Thousand/µL    Basophils Absolute 0 04 0 00 - 0 10 Thousands/µL   UA (URINE) with reflex to Microscopic    Collection Time: 02/10/19 12:33 AM   Result Value Ref Range    Color, UA Light Yellow     Clarity, UA Clear     Specific [place], UA 1 010 1 003 - 1 030    pH, UA 7 0 4 5 - 8 0    Leukocytes, UA Negative Negative    Nitrite, UA Negative Negative    Protein, UA Negative Negative mg/dl    Glucose, UA Negative Negative mg/dl    Ketones, UA Negative Negative mg/dl    Urobilinogen, UA 0 2 0 2, 1 0 E U /dl E U /dl    Bilirubin, UA Negative Negative    Blood, UA Negative Negative   Ethanol    Collection Time: 02/10/19 12:33 AM   Result Value Ref Range    Ethanol Lvl <3 0 - 3 mg/dL   Rapid drug screen, urine    Collection Time: 02/10/19 12:33 AM   Result Value Ref Range    Amph/Meth UR Negative Negative    Barbiturate Ur Negative Negative    Benzodiazepine Urine Negative Negative    Cocaine Urine Negative Negative    Methadone Urine Negative Negative    Opiate Urine Negative Negative    PCP Ur Negative Negative    THC Urine Negative Negative   Comprehensive metabolic panel    Collection Time: 02/10/19 12:33 AM   Result Value Ref Range    Sodium 142 136 - 145 mmol/L    Potassium 3 0 (L) 3 5 - 5 3 mmol/L    Chloride 103 100 - 108 mmol/L    CO2 27 21 - 32 mmol/L    ANION GAP 12 4 - 13 mmol/L    BUN 8 5 - 25 mg/dL    Creatinine 1 04 0 60 - 1 30 mg/dL    Glucose 99 65 - 140 mg/dL    Calcium 9 8 8 3 - 10 1 mg/dL    AST 19 5 - 45 U/L    ALT 53 12 - 78 U/L    Alkaline Phosphatase 85 46 - 116 U/L    Total Protein 8 0 6 4 - 8 2 g/dL    Albumin 4 8 3 5 - 5 0 g/dL    Total Bilirubin 0 92 0 20 - 1 00 mg/dL    eGFR 100 ml/min/1 73sq m   Troponin I    Collection Time: 02/10/19 12:33 AM   Result Value Ref Range    Troponin I <0 02 <=0 04 ng/mL   ECG 12 lead    Collection Time: 02/10/19  6:00 PM   Result Value Ref Range    Ventricular Rate 109 BPM    Atrial Rate 115 BPM    ME Interval  ms    QRSD Interval 74 ms    QT Interval 330 ms    QTC Interval 444 ms    P Axis  degrees    QRS Axis 84 degrees    T Wave Axis 55 degrees   CBC and differential    Collection Time: 02/10/19  6:05 PM   Result Value Ref Range    WBC 11 60 (H) 4 31 - 10 16 Thousand/uL    RBC 5 50 3 88 - 5 62 Million/uL    Hemoglobin 17 8 (H) 12 0 - 17 0 g/dL    Hematocrit 49 1 36 5 - 49 3 %    MCV 89 82 - 98 fL    MCH 32 4 26 8 - 34 3 pg    MCHC 36 3 31 4 - 37 4 g/dL    RDW 11 9 11 6 - 15 1 %    MPV 10 0 8 9 - 12 7 fL    Platelets 879 215 - 986 Thousands/uL    nRBC 0 /100 WBCs    Neutrophils Relative 80 (H) 43 - 75 %    Immat GRANS % 0 0 - 2 %    Lymphocytes Relative 15 14 - 44 % Monocytes Relative 5 4 - 12 %    Eosinophils Relative 0 0 - 6 %    Basophils Relative 0 0 - 1 %    Neutrophils Absolute 9 14 (H) 1 85 - 7 62 Thousands/µL    Immature Grans Absolute 0 04 0 00 - 0 20 Thousand/uL    Lymphocytes Absolute 1 72 0 60 - 4 47 Thousands/µL    Monocytes Absolute 0 61 0 17 - 1 22 Thousand/µL    Eosinophils Absolute 0 05 0 00 - 0 61 Thousand/µL    Basophils Absolute 0 04 0 00 - 0 10 Thousands/µL   D-Dimer    Collection Time: 02/10/19  6:05 PM   Result Value Ref Range    D-Dimer, Quant 279 <500 ng/ml (FEU)   Comprehensive metabolic panel    Collection Time: 02/10/19  6:05 PM   Result Value Ref Range    Sodium 141 136 - 145 mmol/L    Potassium 3 2 (L) 3 5 - 5 3 mmol/L    Chloride 104 100 - 108 mmol/L    CO2 26 21 - 32 mmol/L    ANION GAP 11 4 - 13 mmol/L    BUN 9 5 - 25 mg/dL    Creatinine 1 06 0 60 - 1 30 mg/dL    Glucose 95 65 - 140 mg/dL    Calcium 9 9 8 3 - 10 1 mg/dL    AST 19 5 - 45 U/L    ALT 48 12 - 78 U/L    Alkaline Phosphatase 76 46 - 116 U/L    Total Protein 7 6 6 4 - 8 2 g/dL    Albumin 4 6 3 5 - 5 0 g/dL    Total Bilirubin 2 09 (H) 0 20 - 1 00 mg/dL    eGFR 98 ml/min/1 73sq m   Protime-INR    Collection Time: 02/10/19  6:05 PM   Result Value Ref Range    Protime 14 4 (H) 11 8 - 14 2 seconds    INR 1 11 0 86 - 1 17   APTT    Collection Time: 02/10/19  6:05 PM   Result Value Ref Range    PTT 28 26 - 38 seconds   Troponin I    Collection Time: 02/10/19  6:05 PM   Result Value Ref Range    Troponin I <0 02 <=0 04 ng/mL   TSH, 3rd generation with Free T4 reflex    Collection Time: 02/10/19  6:05 PM   Result Value Ref Range    TSH 3RD GENERATON 1 378 0 358 - 3 740 uIU/mL   Hepatitis C antibody    Collection Time: 02/20/19  9:03 AM   Result Value Ref Range    Hepatitis C Ab Non-reactive Non-reactive   Basic metabolic panel    Collection Time: 02/20/19  9:03 AM   Result Value Ref Range    Sodium 139 136 - 145 mmol/L    Potassium 4 2 3 5 - 5 3 mmol/L    Chloride 102 100 - 108 mmol/L    CO2 28 21 - 32 mmol/L    ANION GAP 9 4 - 13 mmol/L    BUN 7 5 - 25 mg/dL    Creatinine 0 98 0 60 - 1 30 mg/dL    Glucose, Fasting 89 65 - 99 mg/dL    Calcium 9 7 8 3 - 10 1 mg/dL    eGFR 107 ml/min/1 73sq m            Physical Exam   Constitutional: He is oriented to person, place, and time  He appears well-developed and well-nourished  No distress  HENT:   Head: Normocephalic and atraumatic  Right Ear: External ear normal  Tympanic membrane is bulging  Tympanic membrane is not erythematous  A middle ear effusion is present  Left Ear: External ear normal  Tympanic membrane is bulging  Tympanic membrane is not erythematous  A middle ear effusion is present  Nose: Nose normal    Mouth/Throat: Oropharynx is clear and moist  No oropharyngeal exudate  Eyes: Pupils are equal, round, and reactive to light  Conjunctivae and EOM are normal  Right eye exhibits no discharge  Left eye exhibits no discharge  Neck: Normal range of motion  Neck supple  No thyromegaly present  Cardiovascular: Normal rate, regular rhythm, normal heart sounds and intact distal pulses  Exam reveals no gallop and no friction rub  No murmur heard  Pulmonary/Chest: Effort normal and breath sounds normal  No stridor  No respiratory distress  He has no wheezes  He has no rales  Abdominal: Soft  Bowel sounds are normal  He exhibits no distension  There is no tenderness  Lymphadenopathy:     He has no cervical adenopathy  Neurological: He is alert and oriented to person, place, and time  Skin: Skin is warm and dry  No rash noted  He is not diaphoretic  No erythema  Psychiatric: He has a normal mood and affect   His behavior is normal  Judgment and thought content normal

## 2019-03-20 NOTE — ASSESSMENT & PLAN NOTE
Patient is to continue to take lisinopril, patient's blood pressure has much improved  Continue to monitor blood pressure at home  Goal BP is < 130/80  Contact our office for consistent elevations  Recommend low sodium diet  Exercise 30 minutes three times a week as tolerated    Recommend yearly eye exam

## 2019-03-20 NOTE — ASSESSMENT & PLAN NOTE
Patient currently states he has been taking his Zoloft consistently, I discussed the importance of not abruptly discontinuing this medication  I continue to advised patient to get a pillbox to help patient remember to take his medications consistently  Patient currently follows psychiatry

## 2019-04-15 ENCOUNTER — CLINICAL SUPPORT (OUTPATIENT)
Dept: INTERNAL MEDICINE CLINIC | Facility: CLINIC | Age: 24
End: 2019-04-15
Payer: MEDICARE

## 2019-04-15 DIAGNOSIS — I10 ESSENTIAL HYPERTENSION: Primary | ICD-10-CM

## 2019-04-15 LAB
ALBUMIN SERPL BCP-MCNC: 3.8 G/DL (ref 3.5–5)
ALP SERPL-CCNC: 50 U/L (ref 46–116)
ALT SERPL W P-5'-P-CCNC: 17 U/L (ref 12–78)
ANION GAP SERPL CALCULATED.3IONS-SCNC: 2 MMOL/L (ref 4–13)
AST SERPL W P-5'-P-CCNC: 12 U/L (ref 5–45)
BASOPHILS # BLD AUTO: 0.05 THOUSANDS/ΜL (ref 0–0.1)
BASOPHILS NFR BLD AUTO: 1 % (ref 0–1)
BILIRUB SERPL-MCNC: 0.51 MG/DL (ref 0.2–1)
BUN SERPL-MCNC: 11 MG/DL (ref 5–25)
CALCIUM SERPL-MCNC: 8.4 MG/DL (ref 8.3–10.1)
CHLORIDE SERPL-SCNC: 106 MMOL/L (ref 100–108)
CHOLEST SERPL-MCNC: 231 MG/DL (ref 50–200)
CO2 SERPL-SCNC: 29 MMOL/L (ref 21–32)
CREAT SERPL-MCNC: 0.72 MG/DL (ref 0.6–1.3)
EOSINOPHIL # BLD AUTO: 0.1 THOUSAND/ΜL (ref 0–0.61)
EOSINOPHIL NFR BLD AUTO: 2 % (ref 0–6)
ERYTHROCYTE [DISTWIDTH] IN BLOOD BY AUTOMATED COUNT: 12.4 % (ref 11.6–15.1)
GFR SERPL CREATININE-BSD FRML MDRD: 130 ML/MIN/1.73SQ M
GLUCOSE P FAST SERPL-MCNC: 81 MG/DL (ref 65–99)
HCT VFR BLD AUTO: 38.4 % (ref 36.5–49.3)
HDLC SERPL-MCNC: 71 MG/DL (ref 40–60)
HGB BLD-MCNC: 12.1 G/DL (ref 12–17)
IMM GRANULOCYTES # BLD AUTO: 0.01 THOUSAND/UL (ref 0–0.2)
IMM GRANULOCYTES NFR BLD AUTO: 0 % (ref 0–2)
LDLC SERPL CALC-MCNC: 148 MG/DL (ref 0–100)
LYMPHOCYTES # BLD AUTO: 1.12 THOUSANDS/ΜL (ref 0.6–4.47)
LYMPHOCYTES NFR BLD AUTO: 25 % (ref 14–44)
MCH RBC QN AUTO: 29.2 PG (ref 26.8–34.3)
MCHC RBC AUTO-ENTMCNC: 31.5 G/DL (ref 31.4–37.4)
MCV RBC AUTO: 93 FL (ref 82–98)
MONOCYTES # BLD AUTO: 0.51 THOUSAND/ΜL (ref 0.17–1.22)
MONOCYTES NFR BLD AUTO: 12 % (ref 4–12)
NEUTROPHILS # BLD AUTO: 2.65 THOUSANDS/ΜL (ref 1.85–7.62)
NEUTS SEG NFR BLD AUTO: 60 % (ref 43–75)
NONHDLC SERPL-MCNC: 160 MG/DL
NRBC BLD AUTO-RTO: 0 /100 WBCS
PMV BLD AUTO: 14.4 FL (ref 8.9–12.7)
POTASSIUM SERPL-SCNC: 4.3 MMOL/L (ref 3.5–5.3)
PROT SERPL-MCNC: 6.7 G/DL (ref 6.4–8.2)
RBC # BLD AUTO: 4.14 MILLION/UL (ref 3.88–5.62)
SODIUM SERPL-SCNC: 137 MMOL/L (ref 136–145)
TRIGL SERPL-MCNC: 58 MG/DL
WBC # BLD AUTO: 4.44 THOUSAND/UL (ref 4.31–10.16)

## 2019-04-15 PROCEDURE — 80061 LIPID PANEL: CPT | Performed by: NURSE PRACTITIONER

## 2019-04-15 PROCEDURE — 36415 COLL VENOUS BLD VENIPUNCTURE: CPT

## 2019-04-15 PROCEDURE — 85025 COMPLETE CBC W/AUTO DIFF WBC: CPT | Performed by: NURSE PRACTITIONER

## 2019-04-15 PROCEDURE — 80053 COMPREHEN METABOLIC PANEL: CPT | Performed by: NURSE PRACTITIONER

## 2019-04-18 ENCOUNTER — OFFICE VISIT (OUTPATIENT)
Dept: INTERNAL MEDICINE CLINIC | Age: 24
End: 2019-04-18
Payer: MEDICARE

## 2019-04-18 VITALS
HEIGHT: 72 IN | OXYGEN SATURATION: 97 % | WEIGHT: 159.4 LBS | TEMPERATURE: 97.2 F | BODY MASS INDEX: 21.59 KG/M2 | SYSTOLIC BLOOD PRESSURE: 144 MMHG | HEART RATE: 82 BPM | DIASTOLIC BLOOD PRESSURE: 68 MMHG

## 2019-04-18 DIAGNOSIS — I10 ESSENTIAL HYPERTENSION: ICD-10-CM

## 2019-04-18 DIAGNOSIS — E87.6 HYPOKALEMIA: Primary | ICD-10-CM

## 2019-04-18 PROCEDURE — 99213 OFFICE O/P EST LOW 20 MIN: CPT | Performed by: NURSE PRACTITIONER

## 2019-05-03 ENCOUNTER — TELEPHONE (OUTPATIENT)
Dept: INTERNAL MEDICINE CLINIC | Facility: CLINIC | Age: 24
End: 2019-05-03

## 2019-05-08 ENCOUNTER — OFFICE VISIT (OUTPATIENT)
Dept: INTERNAL MEDICINE CLINIC | Facility: CLINIC | Age: 24
End: 2019-05-08
Payer: MEDICARE

## 2019-05-08 VITALS
HEART RATE: 60 BPM | DIASTOLIC BLOOD PRESSURE: 90 MMHG | HEIGHT: 72 IN | OXYGEN SATURATION: 99 % | SYSTOLIC BLOOD PRESSURE: 130 MMHG | TEMPERATURE: 97.6 F | WEIGHT: 156.4 LBS | BODY MASS INDEX: 21.18 KG/M2

## 2019-05-08 DIAGNOSIS — K21.9 GASTROESOPHAGEAL REFLUX DISEASE WITHOUT ESOPHAGITIS: Primary | ICD-10-CM

## 2019-05-08 PROCEDURE — 99213 OFFICE O/P EST LOW 20 MIN: CPT | Performed by: NURSE PRACTITIONER

## 2019-05-14 ENCOUNTER — APPOINTMENT (EMERGENCY)
Dept: CT IMAGING | Facility: HOSPITAL | Age: 24
End: 2019-05-14
Payer: MEDICARE

## 2019-05-14 ENCOUNTER — HOSPITAL ENCOUNTER (EMERGENCY)
Facility: HOSPITAL | Age: 24
Discharge: HOME | End: 2019-05-14
Attending: EMERGENCY MEDICINE | Admitting: EMERGENCY MEDICINE
Payer: MEDICARE

## 2019-05-14 VITALS
RESPIRATION RATE: 17 BRPM | TEMPERATURE: 98.7 F | BODY MASS INDEX: 21.53 KG/M2 | SYSTOLIC BLOOD PRESSURE: 151 MMHG | DIASTOLIC BLOOD PRESSURE: 91 MMHG | OXYGEN SATURATION: 99 % | WEIGHT: 158.73 LBS | HEART RATE: 116 BPM

## 2019-05-14 DIAGNOSIS — K59.00 CONSTIPATION: ICD-10-CM

## 2019-05-14 DIAGNOSIS — R10.9 ABDOMINAL PAIN: Primary | ICD-10-CM

## 2019-05-14 DIAGNOSIS — R11.0 NAUSEA: ICD-10-CM

## 2019-05-14 LAB
ALBUMIN SERPL BCP-MCNC: 5 G/DL (ref 3.5–5)
ALP SERPL-CCNC: 65 U/L (ref 46–116)
ALT SERPL W P-5'-P-CCNC: 73 U/L (ref 12–78)
ANION GAP SERPL CALCULATED.3IONS-SCNC: 8 MMOL/L (ref 4–13)
AST SERPL W P-5'-P-CCNC: 47 U/L (ref 5–45)
BASOPHILS # BLD AUTO: 0.02 THOUSANDS/ΜL (ref 0–0.1)
BASOPHILS NFR BLD AUTO: 0 % (ref 0–1)
BILIRUB SERPL-MCNC: 1.29 MG/DL (ref 0.2–1)
BILIRUB UR QL STRIP: NEGATIVE
BUN SERPL-MCNC: 10 MG/DL (ref 5–25)
CALCIUM SERPL-MCNC: 9.9 MG/DL (ref 8.3–10.1)
CHLORIDE SERPL-SCNC: 101 MMOL/L (ref 100–108)
CLARITY UR: CLEAR
CO2 SERPL-SCNC: 29 MMOL/L (ref 21–32)
COLOR UR: YELLOW
COLOR, POC: YELLOW
CREAT SERPL-MCNC: 0.87 MG/DL (ref 0.6–1.3)
EOSINOPHIL # BLD AUTO: 0.07 THOUSAND/ΜL (ref 0–0.61)
EOSINOPHIL NFR BLD AUTO: 1 % (ref 0–6)
ERYTHROCYTE [DISTWIDTH] IN BLOOD BY AUTOMATED COUNT: 12 % (ref 11.6–15.1)
GFR SERPL CREATININE-BSD FRML MDRD: 120 ML/MIN/1.73SQ M
GLUCOSE SERPL-MCNC: 100 MG/DL (ref 65–140)
GLUCOSE UR STRIP-MCNC: NEGATIVE MG/DL
HCT VFR BLD AUTO: 49.7 % (ref 36.5–49.3)
HGB BLD-MCNC: 17.8 G/DL (ref 12–17)
HGB UR QL STRIP.AUTO: NEGATIVE
IMM GRANULOCYTES # BLD AUTO: 0.01 THOUSAND/UL (ref 0–0.2)
IMM GRANULOCYTES NFR BLD AUTO: 0 % (ref 0–2)
KETONES UR STRIP-MCNC: NEGATIVE MG/DL
LEUKOCYTE ESTERASE UR QL STRIP: NEGATIVE
LIPASE SERPL-CCNC: 283 U/L (ref 73–393)
LYMPHOCYTES # BLD AUTO: 1.86 THOUSANDS/ΜL (ref 0.6–4.47)
LYMPHOCYTES NFR BLD AUTO: 33 % (ref 14–44)
MCH RBC QN AUTO: 32.1 PG (ref 26.8–34.3)
MCHC RBC AUTO-ENTMCNC: 35.8 G/DL (ref 31.4–37.4)
MCV RBC AUTO: 90 FL (ref 82–98)
MONOCYTES # BLD AUTO: 0.57 THOUSAND/ΜL (ref 0.17–1.22)
MONOCYTES NFR BLD AUTO: 10 % (ref 4–12)
NEUTROPHILS # BLD AUTO: 3.11 THOUSANDS/ΜL (ref 1.85–7.62)
NEUTS SEG NFR BLD AUTO: 56 % (ref 43–75)
NITRITE UR QL STRIP: NEGATIVE
NRBC BLD AUTO-RTO: 0 /100 WBCS
PH UR STRIP.AUTO: 7 [PH] (ref 4.5–8)
PLATELET # BLD AUTO: 201 THOUSANDS/UL (ref 149–390)
PMV BLD AUTO: 9.9 FL (ref 8.9–12.7)
POTASSIUM SERPL-SCNC: 3.5 MMOL/L (ref 3.5–5.3)
PROT SERPL-MCNC: 8.3 G/DL (ref 6.4–8.2)
PROT UR STRIP-MCNC: NEGATIVE MG/DL
RBC # BLD AUTO: 5.55 MILLION/UL (ref 3.88–5.62)
SODIUM SERPL-SCNC: 138 MMOL/L (ref 136–145)
SP GR UR STRIP.AUTO: 1.01 (ref 1–1.03)
UROBILINOGEN UR QL STRIP.AUTO: 0.2 E.U./DL
WBC # BLD AUTO: 5.64 THOUSAND/UL (ref 4.31–10.16)

## 2019-05-14 PROCEDURE — 83690 ASSAY OF LIPASE: CPT | Performed by: EMERGENCY MEDICINE

## 2019-05-14 PROCEDURE — 80053 COMPREHEN METABOLIC PANEL: CPT | Performed by: EMERGENCY MEDICINE

## 2019-05-14 PROCEDURE — 81003 URINALYSIS AUTO W/O SCOPE: CPT

## 2019-05-14 PROCEDURE — 74177 CT ABD & PELVIS W/CONTRAST: CPT

## 2019-05-14 PROCEDURE — 87591 N.GONORRHOEAE DNA AMP PROB: CPT | Performed by: EMERGENCY MEDICINE

## 2019-05-14 PROCEDURE — 87491 CHLMYD TRACH DNA AMP PROBE: CPT | Performed by: EMERGENCY MEDICINE

## 2019-05-14 PROCEDURE — 96361 HYDRATE IV INFUSION ADD-ON: CPT

## 2019-05-14 PROCEDURE — 99284 EMERGENCY DEPT VISIT MOD MDM: CPT

## 2019-05-14 PROCEDURE — 85025 COMPLETE CBC W/AUTO DIFF WBC: CPT | Performed by: EMERGENCY MEDICINE

## 2019-05-14 PROCEDURE — 99284 EMERGENCY DEPT VISIT MOD MDM: CPT | Performed by: EMERGENCY MEDICINE

## 2019-05-14 PROCEDURE — 96374 THER/PROPH/DIAG INJ IV PUSH: CPT

## 2019-05-14 PROCEDURE — 96375 TX/PRO/DX INJ NEW DRUG ADDON: CPT

## 2019-05-14 PROCEDURE — 36415 COLL VENOUS BLD VENIPUNCTURE: CPT | Performed by: EMERGENCY MEDICINE

## 2019-05-14 RX ORDER — POLYETHYLENE GLYCOL 3350 17 G/17G
17 POWDER, FOR SOLUTION ORAL DAILY
Qty: 7 EACH | Refills: 0 | Status: SHIPPED | OUTPATIENT
Start: 2019-05-14 | End: 2019-10-09 | Stop reason: ALTCHOICE

## 2019-05-14 RX ORDER — ONDANSETRON 4 MG/1
4 TABLET, FILM COATED ORAL EVERY 6 HOURS
Qty: 6 TABLET | Refills: 0 | Status: SHIPPED | OUTPATIENT
Start: 2019-05-14 | End: 2019-10-09 | Stop reason: ALTCHOICE

## 2019-05-14 RX ORDER — KETOROLAC TROMETHAMINE 30 MG/ML
15 INJECTION, SOLUTION INTRAMUSCULAR; INTRAVENOUS ONCE
Status: COMPLETED | OUTPATIENT
Start: 2019-05-14 | End: 2019-05-14

## 2019-05-14 RX ORDER — METOCLOPRAMIDE HYDROCHLORIDE 5 MG/ML
10 INJECTION INTRAMUSCULAR; INTRAVENOUS ONCE
Status: COMPLETED | OUTPATIENT
Start: 2019-05-14 | End: 2019-05-14

## 2019-05-14 RX ADMIN — SODIUM CHLORIDE 1000 ML: 0.9 INJECTION, SOLUTION INTRAVENOUS at 12:54

## 2019-05-14 RX ADMIN — METOCLOPRAMIDE 10 MG: 5 INJECTION, SOLUTION INTRAMUSCULAR; INTRAVENOUS at 12:55

## 2019-05-14 RX ADMIN — IOHEXOL 100 ML: 350 INJECTION, SOLUTION INTRAVENOUS at 13:53

## 2019-05-14 RX ADMIN — KETOROLAC TROMETHAMINE 15 MG: 30 INJECTION, SOLUTION INTRAMUSCULAR at 12:54

## 2019-05-15 LAB
C TRACH DNA SPEC QL NAA+PROBE: NEGATIVE
N GONORRHOEA DNA SPEC QL NAA+PROBE: NEGATIVE

## 2019-05-23 ENCOUNTER — TELEPHONE (OUTPATIENT)
Dept: INTERNAL MEDICINE CLINIC | Age: 24
End: 2019-05-23

## 2019-06-13 ENCOUNTER — OFFICE VISIT (OUTPATIENT)
Dept: INTERNAL MEDICINE CLINIC | Facility: CLINIC | Age: 24
End: 2019-06-13
Payer: MEDICARE

## 2019-06-13 VITALS
OXYGEN SATURATION: 98 % | WEIGHT: 158 LBS | HEIGHT: 72 IN | DIASTOLIC BLOOD PRESSURE: 82 MMHG | SYSTOLIC BLOOD PRESSURE: 150 MMHG | HEART RATE: 86 BPM | TEMPERATURE: 98.4 F | BODY MASS INDEX: 21.4 KG/M2

## 2019-06-13 DIAGNOSIS — I10 ESSENTIAL HYPERTENSION: ICD-10-CM

## 2019-06-13 DIAGNOSIS — F20.9 SCHIZOPHRENIA, UNSPECIFIED TYPE (HCC): Primary | ICD-10-CM

## 2019-06-13 DIAGNOSIS — J30.2 SEASONAL ALLERGIES: ICD-10-CM

## 2019-06-13 PROCEDURE — 99214 OFFICE O/P EST MOD 30 MIN: CPT | Performed by: NURSE PRACTITIONER

## 2019-06-14 PROBLEM — J30.2 SEASONAL ALLERGIES: Status: ACTIVE | Noted: 2019-06-14

## 2019-06-25 DIAGNOSIS — I10 ESSENTIAL HYPERTENSION: ICD-10-CM

## 2019-06-26 RX ORDER — LISINOPRIL 5 MG/1
5 TABLET ORAL DAILY
Qty: 90 TABLET | Refills: 1 | Status: SHIPPED | OUTPATIENT
Start: 2019-06-26 | End: 2019-08-05 | Stop reason: SDUPTHER

## 2019-08-05 ENCOUNTER — OFFICE VISIT (OUTPATIENT)
Dept: INTERNAL MEDICINE CLINIC | Age: 24
End: 2019-08-05

## 2019-08-05 VITALS
HEIGHT: 72 IN | DIASTOLIC BLOOD PRESSURE: 90 MMHG | TEMPERATURE: 97.9 F | WEIGHT: 155 LBS | SYSTOLIC BLOOD PRESSURE: 140 MMHG | HEART RATE: 78 BPM | BODY MASS INDEX: 20.99 KG/M2 | OXYGEN SATURATION: 98 %

## 2019-08-05 DIAGNOSIS — F20.9 SCHIZOPHRENIA, UNSPECIFIED TYPE (HCC): ICD-10-CM

## 2019-08-05 DIAGNOSIS — K21.9 GASTROESOPHAGEAL REFLUX DISEASE WITHOUT ESOPHAGITIS: ICD-10-CM

## 2019-08-05 DIAGNOSIS — F95.2 TOURETTE'S SYNDROME: ICD-10-CM

## 2019-08-05 DIAGNOSIS — I10 ESSENTIAL HYPERTENSION: ICD-10-CM

## 2019-08-05 DIAGNOSIS — E78.2 MIXED HYPERLIPIDEMIA: Primary | ICD-10-CM

## 2019-08-05 PROCEDURE — 99213 OFFICE O/P EST LOW 20 MIN: CPT | Performed by: NURSE PRACTITIONER

## 2019-08-05 RX ORDER — LISINOPRIL 5 MG/1
5 TABLET ORAL DAILY
Qty: 90 TABLET | Refills: 0 | Status: SHIPPED | OUTPATIENT
Start: 2019-08-05 | End: 2020-03-02 | Stop reason: ALTCHOICE

## 2019-08-05 NOTE — PROGRESS NOTES
Assessment/Plan:    Hypertension    Patient has been noncompliant with his blood pressure medication, patient's blood pressure 140/90 today heart rate 78 had long discussion with patient on the importance of continuing to take his blood pressure medication  Continue current regimen -   Continue to monitor blood pressure at home  Goal BP is < 130/80  Contact our office for consistent elevations  Recommend low sodium diet  Exercise 30 minutes three times a week as tolerated  Recommend yearly eye exam       Schizophrenia [facility])    Patient has been noncompliant with his Zoloft, he does not take his medication, patient is currently being followed closely by Psychology, patient was given referral to psychologist   Patient currently has psychologist but would like a 2nd opinion  Gastroesophageal reflux disease without esophagitis   You may use OTC tums as needed  Recommend small frequent meals throughout the day  Avoid aggravating foods - spicy foods, acidic foods - such as tomato and citrus  Avoid alcohol  Do not lay down for at least 30 mins after eating  Diagnoses and all orders for this visit:    Mixed hyperlipidemia  -     Lipid panel    Essential hypertension  -     lisinopril (ZESTRIL) 5 mg tablet; Take 1 tablet (5 mg total) by mouth daily  -     CBC and differential  -     Comprehensive metabolic panel; Future    Schizophrenia, unspecified type [facility])  -     Ambulatory referral to Psychiatry; Future    Tourette's syndrome  -     Ambulatory referral to Psychiatry; Future    Gastroesophageal reflux disease without esophagitis          Subjective:      Patient ID: Deisy Hannah is a 21 y o  male  Patient presents today to follow-up on hypertension and schizophrenia  Patient currently follow Psychology as well as Psychiatry, patient was started on Zoloft a little while back but has stopped taking it approximately 2-3 months ago as he states he does not need it"    Patient states he has been going to the gym more which has been helping  However patient has complaints of trouble with concentration  Patient has no other new complaints  Patient also reports he has not been taking his blood pressure medication  Hypertension   This is a chronic problem  The current episode started more than 1 year ago  The problem has been waxing and waning since onset  The problem is uncontrolled  Pertinent negatives include no chest pain, headaches, neck pain, palpitations or shortness of breath  Risk factors for coronary artery disease include male gender  Compliance problems include exercise and diet  The following portions of the patient's history were reviewed and updated as appropriate: allergies, current medications, past family history, past medical history, past social history, past surgical history and problem list     Review of Systems   Constitutional: Negative for activity change, appetite change, chills, diaphoresis and fever  HENT: Negative for congestion, ear discharge, ear pain, postnasal drip, rhinorrhea, sinus pressure, sinus pain and sore throat  Eyes: Negative for pain, discharge, itching and visual disturbance  Respiratory: Negative for cough, chest tightness, shortness of breath and wheezing  Cardiovascular: Negative for chest pain, palpitations and leg swelling  Gastrointestinal: Negative for abdominal pain, constipation, diarrhea, nausea and vomiting  Endocrine: Negative for polydipsia, polyphagia and polyuria  Genitourinary: Negative for difficulty urinating, dysuria, hematuria and urgency  Musculoskeletal: Negative for arthralgias, back pain and neck pain  Skin: Negative for rash and wound  Neurological: Negative for dizziness, weakness, numbness and headaches           Past Medical History:   Diagnosis Date    Anxiety     Last assessed 10/18/16    Asthma     Last assessed 03/15/13    Closed fracture of nasal bone 4/24/2018    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    Intermittent explosive disorder     Oppositional defiant disorder     Palpitation 2/13/2019    [facility]     last assessed 12/04/17    Scoliosis          Current Outpatient Medications:     lisinopril (ZESTRIL) 5 mg tablet, Take 1 tablet (5 mg total) by mouth daily, Disp: 90 tablet, Rfl: 0    ondansetron (ZOFRAN) 4 mg tablet, Take 1 tablet (4 mg total) by mouth every 6 (six) hours (Patient not taking: Reported on 8/5/2019), Disp: 6 tablet, Rfl: 0    polyethylene glycol (MIRALAX) 17 g packet, Take 17 g by mouth daily (Patient not taking: Reported on 8/5/2019), Disp: 7 each, Rfl: 0    sertraline (ZOLOFT) 25 mg tablet, Take 1 tablet (25 mg total) by mouth daily (Patient not taking: Reported on 8/5/2019), Disp: 30 tablet, Rfl: 0    No Known Allergies    Social History   Past Surgical History:   Procedure Laterality Date    HERNIA REPAIR      NO PAST SURGERIES      UT LAP,APPENDECTOMY N/A 1/9/2019    Procedure: Mariela Ferrera;  Surgeon: Mati Domínguez MD;  [facility];  Service: General    UT LAP,DIAGNOSTIC ABDOMEN N/A 1/9/2019    Procedure: LAPAROSCOPY DIAGNOSTIC;  Surgeon: Mati Domínguez MD;  [facility];  Service: General     Family History   Problem Relation Age of Onset    Diabetes Mother     Bipolar disorder Family     Schizophrenia Family     Diabetes type II Family        Objective:  /90 (BP Location: Right arm, Patient Position: Sitting, Cuff Size: Adult)   Pulse 78   Temp 97 9 °F (36 6 °C) (Tympanic)   Ht 5' 11 5" (1 816 m)   Wt 70 3 kg (155 lb)   SpO2 98%   BMI 21 32 kg/m²     No results found for this or any previous visit (from the past 1344 hour(s))  Physical Exam   Constitutional: He is oriented to person, place, and time  He appears well-developed and well-nourished  No distress  HENT:   Head: Normocephalic and atraumatic     Right Ear: External ear normal    Left Ear: External ear normal    Nose: Nose normal    Mouth/Throat: Oropharynx is clear and moist  No oropharyngeal exudate  Eyes: Pupils are equal, round, and reactive to light  Conjunctivae and EOM are normal  Right eye exhibits no discharge  Left eye exhibits no discharge  Neck: Normal range of motion  Neck supple  No thyromegaly present  Cardiovascular: Normal rate, regular rhythm, normal heart sounds and intact distal pulses  Exam reveals no gallop and no friction rub  No murmur heard  Pulmonary/Chest: Effort normal and breath sounds normal  No stridor  No respiratory distress  He has no wheezes  He has no rales  Abdominal: Soft  Bowel sounds are normal  He exhibits no distension  There is no tenderness  Lymphadenopathy:     He has no cervical adenopathy  Neurological: He is alert and oriented to person, place, and time  Skin: Skin is warm and dry  No rash noted  He is not diaphoretic  No erythema  Psychiatric: He has a normal mood and affect   His behavior is normal  Judgment and thought content normal

## 2019-08-07 PROBLEM — S02.2XXA CLOSED FRACTURE OF NASAL BONE: Status: RESOLVED | Noted: 2018-04-24 | Resolved: 2019-08-07

## 2019-08-07 PROBLEM — R00.2 PALPITATION: Status: RESOLVED | Noted: 2019-02-13 | Resolved: 2019-08-07

## 2019-08-07 PROBLEM — N48.9 DISORDER OF PENIS: Status: RESOLVED | Noted: 2018-08-28 | Resolved: 2019-08-07

## 2019-08-07 NOTE — ASSESSMENT & PLAN NOTE
Patient has been noncompliant with his Zoloft, he does not take his medication, patient is currently being followed closely by Psychology, patient was given referral to psychologist   Patient currently has psychologist but would like a 2nd opinion

## 2019-08-07 NOTE — ASSESSMENT & PLAN NOTE
You may use OTC tums as needed  Recommend small frequent meals throughout the day  Avoid aggravating foods - spicy foods, acidic foods - such as tomato and citrus  Avoid alcohol  Do not lay down for at least 30 mins after eating

## 2019-08-07 NOTE — ASSESSMENT & PLAN NOTE
Patient has been noncompliant with his blood pressure medication, patient's blood pressure 140/90 today heart rate 78 had long discussion with patient on the importance of continuing to take his blood pressure medication  Continue current regimen -   Continue to monitor blood pressure at home  Goal BP is < 130/80  Contact our office for consistent elevations  Recommend low sodium diet  Exercise 30 minutes three times a week as tolerated    Recommend yearly eye exam

## 2019-10-09 ENCOUNTER — OFFICE VISIT (OUTPATIENT)
Dept: INTERNAL MEDICINE CLINIC | Facility: CLINIC | Age: 24
End: 2019-10-09
Payer: MEDICARE

## 2019-10-09 VITALS
HEART RATE: 68 BPM | OXYGEN SATURATION: 99 % | SYSTOLIC BLOOD PRESSURE: 146 MMHG | DIASTOLIC BLOOD PRESSURE: 88 MMHG | HEIGHT: 72 IN | BODY MASS INDEX: 21.1 KG/M2 | WEIGHT: 155.8 LBS

## 2019-10-09 DIAGNOSIS — J30.2 SEASONAL ALLERGIES: Primary | ICD-10-CM

## 2019-10-09 DIAGNOSIS — R21 RASH: ICD-10-CM

## 2019-10-09 DIAGNOSIS — I10 ESSENTIAL HYPERTENSION: ICD-10-CM

## 2019-10-09 PROCEDURE — 99213 OFFICE O/P EST LOW 20 MIN: CPT | Performed by: NURSE PRACTITIONER

## 2019-10-09 RX ORDER — CETIRIZINE HYDROCHLORIDE 10 MG/1
10 TABLET ORAL DAILY
Qty: 90 TABLET | Refills: 0 | Status: SHIPPED | OUTPATIENT
Start: 2019-10-09 | End: 2020-03-02 | Stop reason: ALTCHOICE

## 2019-10-09 NOTE — PROGRESS NOTES
Assessment/Plan:    Rash  Patient is to start Zyrtec, patient may use over-the-counter hydrocortisone cream     Hypertension    Patient has been noncompliant with his blood pressure medication, had long discussion with patient on the importance of continuing to take his blood pressure medication  Continue current regimen -   Continue to monitor blood pressure at home  Goal BP is < 130/80  Contact our office for consistent elevations  Recommend low sodium diet  Exercise 30 minutes three times a week as tolerated  Recommend yearly eye exam           Tobacco Cessation Counseling: Tobacco cessation counseling was provided  The patient is sincerely urged to quit consumption of tobacco  He is not ready to quit tobacco  Medication options discussed  Patient refused medication  Diagnoses and all orders for this visit:    Seasonal allergies  -     cetirizine (ZyrTEC) 10 mg tablet; Take 1 tablet (10 mg total) by mouth daily    Rash    Essential hypertension          Subjective:      Patient ID: Álvaro Whatley is a 21 y o  male  Patient presents today for complaints of rash to his right hand and his right lower back  Patient states he has been using Benadryl cream however has not been helping with the itching  Patient also reports he has not been taking his blood pressure medication, blood pressure elevated at office visit today  Rash   This is a new problem  The current episode started 1 to 4 weeks ago  The problem has been waxing and waning since onset  The affected locations include the right hand (right lower back)  The rash is characterized by itchiness and redness  He was exposed to a new detergent/soap  Pertinent negatives include no congestion, cough, diarrhea, eye pain, fever, rhinorrhea, shortness of breath, sore throat or vomiting  Treatments tried: benadryl cream  The treatment provided no relief         The following portions of the patient's history were reviewed and updated as appropriate: allergies, current medications, past family history, past medical history, past social history, past surgical history and problem list     Review of Systems   Constitutional: Negative for activity change, appetite change, chills, diaphoresis and fever  HENT: Negative for congestion, ear discharge, ear pain, postnasal drip, rhinorrhea, sinus pressure, sinus pain and sore throat  Eyes: Negative for pain, discharge, itching and visual disturbance  Respiratory: Negative for cough, chest tightness, shortness of breath and wheezing  Cardiovascular: Negative for chest pain, palpitations and leg swelling  Gastrointestinal: Negative for abdominal pain, constipation, diarrhea, nausea and vomiting  Endocrine: Negative for polydipsia, polyphagia and polyuria  Genitourinary: Negative for difficulty urinating, dysuria and urgency  Musculoskeletal: Negative for arthralgias, back pain and neck pain  Skin: Positive for rash  Negative for wound  Allergic/Immunologic: Positive for environmental allergies  Neurological: Negative for dizziness, weakness, numbness and headaches           Past Medical History:   Diagnosis Date    Anxiety     Last assessed 10/18/16    Asthma     Last assessed 03/15/13    Closed fracture of nasal bone 4/24/2018    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    Intermittent explosive disorder     Oppositional defiant disorder     Palpitation 2/13/2019    Schizophrenia ([facility] [address] )     last assessed 12/04/17    Scoliosis          Current Outpatient Medications:     cetirizine (ZyrTEC) 10 mg tablet, Take 1 tablet (10 mg total) by mouth daily, Disp: 90 tablet, Rfl: 0    lisinopril (ZESTRIL) 5 mg tablet, Take 1 tablet (5 mg total) by mouth daily (Patient not taking: Reported on 10/9/2019), Disp: 90 tablet, Rfl: 0    No Known Allergies    Social History   Past Surgical History:   Procedure Laterality Date    HERNIA REPAIR      NO PAST SURGERIES      [place] LAP,APPENDECTOMY N/A 1/9/2019 Procedure: APPENDECTOMY LAPAROSCOPIC;  Surgeon: Harish Weaver MD;  [facility];  Service: General    [place] LAP,DIAGNOSTIC ABDOMEN N/A 1/9/2019    Procedure: LAPAROSCOPY DIAGNOSTIC;  Surgeon: Harish Weaver MD;  [facility];  Service: General     Family History   Problem Relation Age of Onset    Diabetes Mother     Bipolar disorder Family     Schizophrenia Family     Diabetes type II Family        Objective:  /88 (BP Location: Left arm, Patient Position: Sitting, Cuff Size: Adult)   Pulse 68   Ht 5' 11 5" (1 816 m)   Wt 70 7 kg (155 lb 12 8 oz) Comment: with shoes  SpO2 99% Comment: ra  BMI 21 43 kg/m²     No results found for this or any previous visit (from the past 1344 hour(s))  Physical Exam   Constitutional: He is oriented to person, place, and time  He appears well-developed and well-nourished  No distress  HENT:   Head: Normocephalic and atraumatic  Right Ear: External ear normal    Left Ear: External ear normal    Nose: Nose normal    Mouth/Throat: Oropharynx is clear and moist  No oropharyngeal exudate  Eyes: Pupils are equal, round, and reactive to light  Conjunctivae and EOM are normal  Right eye exhibits no discharge  Left eye exhibits no discharge  Neck: Normal range of motion  Neck supple  No thyromegaly present  Cardiovascular: Normal rate, regular rhythm, normal heart sounds and intact distal pulses  Exam reveals no gallop and no friction rub  No murmur heard  Pulmonary/Chest: Effort normal and breath sounds normal  No stridor  No respiratory distress  He has no wheezes  He has no rales  Abdominal: Soft  Bowel sounds are normal  He exhibits no distension  There is no tenderness  Lymphadenopathy:     He has no cervical adenopathy  Neurological: He is alert and oriented to person, place, and time  Skin: Skin is warm and dry  No rash noted  He is not diaphoretic  No erythema     Patient noted to have acne on his back, scabs noted to patient's right hand   Psychiatric: He has a normal mood and affect   His behavior is normal  Judgment and thought content normal

## 2019-10-09 NOTE — ASSESSMENT & PLAN NOTE
Patient has been noncompliant with his blood pressure medication, had long discussion with patient on the importance of continuing to take his blood pressure medication  Continue current regimen -   Continue to monitor blood pressure at home  Goal BP is < 130/80  Contact our office for consistent elevations  Recommend low sodium diet  Exercise 30 minutes three times a week as tolerated    Recommend yearly eye exam

## 2019-11-26 ENCOUNTER — TELEPHONE (OUTPATIENT)
Dept: INTERNAL MEDICINE CLINIC | Age: 24
End: 2019-11-26

## 2019-12-13 ENCOUNTER — OFFICE VISIT (OUTPATIENT)
Dept: INTERNAL MEDICINE CLINIC | Age: 24
End: 2019-12-13
Payer: MEDICARE

## 2019-12-13 VITALS
HEIGHT: 72 IN | WEIGHT: 150.8 LBS | SYSTOLIC BLOOD PRESSURE: 130 MMHG | HEART RATE: 79 BPM | OXYGEN SATURATION: 99 % | TEMPERATURE: 97.8 F | DIASTOLIC BLOOD PRESSURE: 72 MMHG | BODY MASS INDEX: 20.42 KG/M2

## 2019-12-13 DIAGNOSIS — I10 ESSENTIAL HYPERTENSION: Primary | ICD-10-CM

## 2019-12-13 DIAGNOSIS — Z00.00 MEDICARE ANNUAL WELLNESS VISIT, SUBSEQUENT: ICD-10-CM

## 2019-12-13 DIAGNOSIS — F20.9 SCHIZOPHRENIA, UNSPECIFIED TYPE (HCC): ICD-10-CM

## 2019-12-13 PROCEDURE — 99213 OFFICE O/P EST LOW 20 MIN: CPT | Performed by: NURSE PRACTITIONER

## 2019-12-13 PROCEDURE — G0439 PPPS, SUBSEQ VISIT: HCPCS | Performed by: NURSE PRACTITIONER

## 2019-12-13 NOTE — ASSESSMENT & PLAN NOTE
Patient has been noncompliant with his blood pressure medication, had long discussion with patient on the importance of continuing to take his blood pressure medication  Continue current regimen -   Continue to monitor blood pressure at home  Goal BP is < 130/80  Contact our office for consistent elevations  Recommend low sodium diet  Exercise 30 minutes five times a week as tolerated    Recommend yearly eye exam

## 2019-12-13 NOTE — PROGRESS NOTES
Assessment and Plan:     Problem List Items Addressed This Visit     None          Tobacco Cessation Counseling: Tobacco cessation counseling was provided  The patient is sincerely urged to quit consumption of tobacco  He is not ready to quit tobacco  Medication options and side effects of medication discussed  Patient refused medication  Preventive health issues were discussed with patient, and age appropriate screening tests were ordered as noted in patient's After Visit Summary  Personalized health advice and appropriate referrals for health education or preventive services given if needed, as noted in patient's After Visit Summary  History of Present Illness:     Patient presents for Medicare Annual Wellness visit    Patient Care Team:  Brinda Lopez as PCP - General (Family Medicine)     Problem List:     Patient Active Problem List   Diagnosis    Attention deficit hyperactivity disorder    Intellectual disability    Psychosis, bipolar affective ([facility] [address] )    Schizophrenia ([facility] [address] )    Scoliosis    Tourette's syndrome    Chronic midline low back pain without sciatica    Hypokalemia    Medicare annual wellness visit, subsequent    Encounter for hepatitis C virus screening test for high risk patient    Tattoos    Abdominal pain    Bug bite    Hypertension    Dizziness    Gastroesophageal reflux disease without esophagitis    Seasonal allergies    Rash      Past Medical and Surgical History:     Past Medical History:   Diagnosis Date    Anxiety     Last assessed 10/18/16    Asthma     Last assessed 03/15/13    Closed fracture of nasal bone 4/24/2018    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    Intermittent explosive disorder     Oppositional defiant disorder     Palpitation 2/13/2019    Schizophrenia ([facility] [address] )     last assessed 12/04/17    Scoliosis      Past Surgical History:   Procedure Laterality Date    HERNIA REPAIR      NO PAST SURGERIES      [place] LAP,APPENDECTOMY N/A 1/9/2019 Procedure: APPENDECTOMY LAPAROSCOPIC;  Surgeon: Adelso Lunsford MD;  [facility];  Service: General    [place] LAP,DIAGNOSTIC ABDOMEN N/A 1/9/2019    Procedure: LAPAROSCOPY DIAGNOSTIC;  Surgeon: Adelso Lunsford MD;  [facility];  Service: General      Family History:     Family History   Problem Relation Age of Onset    Diabetes Mother     Bipolar disorder Family     Schizophrenia Family     Diabetes type II Family       Social History:     Social History     Socioeconomic History    Marital status: Single     Spouse name: None    Number of children: None    Years of education: None    Highest education level: None   Occupational History    Occupation: disabled   Social Needs    Financial resource strain: None    Food insecurity:     Worry: None     Inability: None    Transportation needs:     Medical: None     Non-medical: None   Tobacco Use    Smoking status: Current Every Day Smoker     Packs/day: 0 50     Years: 4 00     Pack years: 2 00     Types: Cigarettes    Smokeless tobacco: Never Used   Substance and Sexual Activity    Alcohol use: Yes     Comment: occasional    Drug use: Not Currently     Comment: previous Marijuana use     Sexual activity: None   Lifestyle    Physical activity:     Days per week: None     Minutes per session: None    Stress: None   Relationships    Social connections:     Talks on phone: None     Gets together: None     Attends Latter-day service: None     Active member of club or organization: None     Attends meetings of clubs or organizations: None     Relationship status: None    Intimate partner violence:     Fear of current or ex partner: None     Emotionally abused: None     Physically abused: None     Forced sexual activity: None   Other Topics Concern    None   Social History Narrative    No preference on Latter-day belief       Medications and Allergies:     Current Outpatient Medications   Medication Sig Dispense Refill    cetirizine (ZyrTEC) 10 mg tablet Take 1 tablet (10 mg total) by mouth daily (Patient not taking: Reported on 12/13/2019) 90 tablet 0    lisinopril (ZESTRIL) 5 mg tablet Take 1 tablet (5 mg total) by mouth daily (Patient not taking: Reported on 10/9/2019) 90 tablet 0     No current facility-administered medications for this visit  No Known Allergies   Immunizations:     Immunization History   Administered Date(s) Administered    DTP 1994, 1994, 1994, 06/09/1995, 07/29/1998    DTaP 1994, 1994, 1994, 06/09/1995, 07/29/1998    Hep A, ped/adol, 2 dose 11/04/2008    Hep B, Adolescent or Pediatric 1994, 1994, 1994    Hepatitis A 01/04/2008, 10/20/2011    HiB 1994, 1994, 1994, 06/09/1995    INFLUENZA 12/05/2005, 01/04/2008, 10/28/2011    IPV 1994, 1994, 1994, 07/29/1998    MMR 1994, 03/10/1995, 07/29/1998    Meningococcal MCV4P 12/05/2005    Td (adult), Unspecified 12/05/2005    Td (adult), adsorbed 12/05/2005    Tdap 01/04/2008, 04/14/2018    Tuberculin Skin Test-PPD Intradermal 02/14/2005, 03/15/2013, 10/18/2016, 12/10/2018    Varicella 05/14/1997, 04/14/2011, 10/20/2011      Health Maintenance:         Topic Date Due    Hepatitis C Screening  Completed         Topic Date Due    Pneumococcal Vaccine: Pediatrics (0 to 5 Years) and At-Risk Patients (6 to 59 Years) (1 of 1 - PPSV23) 02/19/2000      Medicare Health Risk Assessment:     /72 (BP Location: Left arm, Patient Position: Sitting, Cuff Size: Adult)   Pulse 79   Temp 97 8 °F (36 6 °C) (Tympanic)   Ht 5' 11 65" (1 82 m)   Wt 68 4 kg (150 lb 12 8 oz)   SpO2 99%   BMI 20 65 kg/m²      Olga Aguero is here for his Subsequent Wellness visit  Health Risk Assessment:   Patient rates overall health as good  Patient feels that their physical health rating is slightly better  Eyesight was rated as same  Hearing was rated as same   Patient feels that their emotional and mental health rating is slightly better  Pain experienced in the last 7 days has been none  Patient states that he has experienced no weight loss or gain in last 6 months  Depression Screening:   PHQ-2 Score: 1      Fall Risk Screening: In the past year, patient has experienced: no history of falling in past year      Home Safety:  Patient does not have trouble with stairs inside or outside of their home  Patient has working smoke alarms and has no working carbon monoxide detector  Home safety hazards include: none  Nutrition:   Current diet is Regular  Trying to eat healthier     Medications:   Patient is not currently taking any over-the-counter supplements  Not taking any medication currently     Activities of Daily Living (ADLs)/Instrumental Activities of Daily Living (IADLs):   Walk and transfer into and out of bed and chair?: Yes  Dress and groom yourself?: Yes    Bathe or shower yourself?: Yes    Feed yourself?  Yes  Do your laundry/housekeeping?: Yes  Manage your money, pay your bills and track your expenses?: Yes  Make your own meals?: Yes    Do your own shopping?: Yes    Previous Hospitalizations:   Any hospitalizations or ED visits within the last 12 months?: Yes      Advance Care Planning:   Living will: No    Durable POA for healthcare: No    Advanced directive: No    Advanced directive counseling given: Yes    Five wishes given: Yes      PREVENTIVE SCREENINGS      Cardiovascular Screening:    General: Screening Not Indicated, History Lipid Disorder and Screening Current      Diabetes Screening:     General: Screening Current      Colorectal Cancer Screening:     General: Screening Not Indicated      Prostate Cancer Screening:    General: Screening Not Indicated      Osteoporosis Screening:    General: Screening Not Indicated      Abdominal Aortic Aneurysm (AAA) Screening:    Risk factors include: tobacco use        Lung Cancer Screening:     General: Screening Not Indicated      Hepatitis C Screening:    General: Screening Current    Other Counseling Topics:   Alcohol use counseling, car/seat belt/driving safety and regular weightbearing exercise         Glenys Alcaraz

## 2019-12-13 NOTE — PROGRESS NOTES
Assessment/Plan:    Hypertension    Patient has been noncompliant with his blood pressure medication, had long discussion with patient on the importance of continuing to take his blood pressure medication  Continue current regimen -   Continue to monitor blood pressure at home  Goal BP is < 130/80  Contact our office for consistent elevations  Recommend low sodium diet  Exercise 30 minutes five times a week as tolerated  Recommend yearly eye exam       Schizophrenia [facility])    Patient has been noncompliant with his Zoloft, he does not take his medication, patient is currently being followed closely by Psychology, patient was given referral to psychologist   Patient currently has psychologist but would like a 2nd opinion  Tobacco Cessation Counseling: Tobacco cessation counseling was provided  The patient is sincerely urged to quit consumption of tobacco  He is not ready to quit tobacco  Medication options and side effects of medication discussed  Patient refused medication  Diagnoses and all orders for this visit:    Essential hypertension    Schizophrenia, unspecified type ([facility] [address] )    Medicare annual wellness visit, subsequent          Subjective:      Patient ID: Ronnie Pabon is a 21 y o  male  Patient presents today to follow-up on hypertension and schizophrenia  Patient currently follow Psychology as well as Psychiatry, patient was started on Zoloft a little while back but has stopped taking it approximately 2-3 months ago as he states he "does not need it"  Patient states he has been going to the gym more which has been helping, and has been hanging out with friends  However patient has complaints of trouble with concentration  Patient has no other new complaints  Patient also reports he has not been taking his blood pressure medication  Patient reports his father recently passed away unexpectily and he is coping well  [place]  Hypertension   This is a chronic problem  The current episode started more than 1 year ago  The problem has been waxing and waning since onset  The problem is uncontrolled  Pertinent negatives include no chest pain, headaches, neck pain, palpitations or shortness of breath  Risk factors for coronary artery disease include male gender  Compliance problems include exercise and diet  The following portions of the patient's history were reviewed and updated as appropriate: allergies, current medications, past family history, past medical history, past social history, past surgical history and problem list     Review of Systems   Constitutional: Negative for activity change, appetite change, chills, diaphoresis and fever  HENT: Negative for congestion, ear discharge, ear pain, postnasal drip, rhinorrhea, sinus pressure, sinus pain and sore throat  Eyes: Negative for pain, discharge, itching and visual disturbance  Respiratory: Negative for cough, chest tightness, shortness of breath and wheezing  Cardiovascular: Negative for chest pain, palpitations and leg swelling  Gastrointestinal: Negative for abdominal pain, constipation, diarrhea, nausea and vomiting  Endocrine: Negative for polydipsia, polyphagia and polyuria  Genitourinary: Negative for difficulty urinating, dysuria, hematuria and urgency  Musculoskeletal: Negative for arthralgias, back pain and neck pain  Skin: Negative for rash and wound  Neurological: Negative for dizziness, weakness, numbness and headaches           Past Medical History:   Diagnosis Date    Anxiety     Last assessed 10/18/16    Asthma     Last assessed 03/15/13    Closed fracture of nasal bone 4/24/2018    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    Intermittent explosive disorder     Oppositional defiant disorder     Palpitation 2/13/2019    Schizophrenia ([facility] [address] )     last assessed 12/04/17    Scoliosis          Current Outpatient Medications:     cetirizine (ZyrTEC) 10 mg tablet, Take 1 tablet (10 mg total) by mouth daily (Patient not taking: Reported on 12/13/2019), Disp: 90 tablet, Rfl: 0    lisinopril (ZESTRIL) 5 mg tablet, Take 1 tablet (5 mg total) by mouth daily (Patient not taking: Reported on 10/9/2019), Disp: 90 tablet, Rfl: 0    No Known Allergies    Social History   Past Surgical History:   Procedure Laterality Date    HERNIA REPAIR      NO PAST SURGERIES      [place] LAP,APPENDECTOMY N/A 1/9/2019    Procedure: Jenelle Degree;  Surgeon: Tuan Schroeder MD;  [facility];  Service: General    [place] LAP,DIAGNOSTIC ABDOMEN N/A 1/9/2019    Procedure: LAPAROSCOPY DIAGNOSTIC;  Surgeon: Tuan Schroeder MD;  [facility];  Service: General     Family History   Problem Relation Age of Onset    Diabetes Mother     Bipolar disorder Family     Schizophrenia Family     Diabetes type II Family        Objective:  /72 (BP Location: Left arm, Patient Position: Sitting, Cuff Size: Adult)   Pulse 79   Temp 97 8 °F (36 6 °C) (Tympanic)   Ht 5' 11 65" (1 82 m)   Wt 68 4 kg (150 lb 12 8 oz)   SpO2 99%   BMI 20 65 kg/m²     No results found for this or any previous visit (from the past 1344 hour(s))  Physical Exam   Constitutional: He is oriented to person, place, and time  He appears well-developed and well-nourished  No distress  HENT:   Head: Normocephalic and atraumatic  Right Ear: External ear normal    Left Ear: External ear normal    Nose: Nose normal    Mouth/Throat: Oropharynx is clear and moist  No oropharyngeal exudate  Eyes: Pupils are equal, round, and reactive to light  Conjunctivae and EOM are normal  Right eye exhibits no discharge  Left eye exhibits no discharge  Neck: Normal range of motion  Neck supple  No thyromegaly present  Cardiovascular: Normal rate, regular rhythm, normal heart sounds and intact distal pulses  Exam reveals no gallop and no friction rub  No murmur heard    Pulmonary/Chest: Effort normal and breath sounds normal  No stridor  No respiratory distress  He has no wheezes  He has no rales  Abdominal: Soft  Bowel sounds are normal  He exhibits no distension  There is no tenderness  Lymphadenopathy:     He has no cervical adenopathy  Neurological: He is alert and oriented to person, place, and time  Skin: Skin is warm and dry  No rash noted  He is not diaphoretic  No erythema  Patient noted to have acne on his back, scabs noted to patient's right hand   Psychiatric: He has a normal mood and affect   His behavior is normal  Judgment and thought content normal

## 2020-03-02 ENCOUNTER — OFFICE VISIT (OUTPATIENT)
Dept: INTERNAL MEDICINE CLINIC | Facility: CLINIC | Age: 25
End: 2020-03-02
Payer: MEDICARE

## 2020-03-02 VITALS
WEIGHT: 155 LBS | BODY MASS INDEX: 20.99 KG/M2 | SYSTOLIC BLOOD PRESSURE: 142 MMHG | TEMPERATURE: 98.8 F | HEIGHT: 72 IN | DIASTOLIC BLOOD PRESSURE: 88 MMHG | HEART RATE: 61 BPM | OXYGEN SATURATION: 98 %

## 2020-03-02 DIAGNOSIS — Z00.00 ANNUAL PHYSICAL EXAM: Primary | ICD-10-CM

## 2020-03-02 PROCEDURE — G0439 PPPS, SUBSEQ VISIT: HCPCS | Performed by: NURSE PRACTITIONER

## 2020-03-02 NOTE — PROGRESS NOTES
[facility] [address] [place] [address] Adult Male Physical Visit  Patient ID: Glenny Jason    : 1995  Age/Gender: 31 y o  male     DATE: 3/2/2020      Assessment/Plan:    Annual physical exam  Continue with well balanced diet, increase daily exercise, cut back on alcohol intake, discussed smoking cessation with patient  Encouraged monthly testicular exam        Diagnoses and all orders for this visit:    Annual physical exam          Subjective:     Glenny Jason is a 31 y o  male who presents to the office on 3/2/2020 for a health maintenance physical     HPI  The following portions of the patient's history were reviewed and updated as appropriate: allergies, current medications, past family history, past medical history, past social history, past surgical history and problem list     Pt reports overall health: good  Last Physical:unknown    Healthy Diet:well balanced  Routine Exercise: denies  Weight Concerns: denies    Problems with vision: denies  Last Eye Exam: last year    Problems with Hearing: denies    Routine Dental Exams: every 6 months    Smoking History: 5 cigs a day for about 6 months  ETOH Use: every other day  Illegal Drug Use:  Denies  Caffeine Use:  8 oz a day      Depression Screening:  PHQ-9 Depression Screening    PHQ-9:    Frequency of the following problems over the past two weeks:       Little interest or pleasure in doing things:  0 - not at all  Feeling down, depressed, or hopeless:  0 - not at all  PHQ-2 Score:  0           Last Labs:  2019    Review of Systems   Constitutional: Negative for activity change, appetite change, chills, diaphoresis and fever  HENT: Positive for congestion  Negative for ear discharge, ear pain, postnasal drip, rhinorrhea, sinus pressure, sinus pain and sore throat  Eyes: Negative for pain, discharge, itching and visual disturbance     Respiratory: Negative for cough, chest tightness, shortness of breath and wheezing  Cardiovascular: Negative for chest pain, palpitations and leg swelling  Gastrointestinal: Negative for abdominal pain, constipation, diarrhea, nausea and vomiting  Endocrine: Negative for polydipsia, polyphagia and polyuria  Genitourinary: Negative for difficulty urinating, dysuria and urgency  Musculoskeletal: Negative for arthralgias, back pain and neck pain  Skin: Negative for rash and wound  Neurological: Negative for dizziness, weakness, numbness and headaches  Patient Active Problem List   Diagnosis    Attention deficit hyperactivity disorder    Intellectual disability    Psychosis, bipolar affective ([facility] [place] [facility] )    Schizophrenia ([facility] [facility] )    Scoliosis    Tourette's syndrome    Chronic midline low back pain without sciatica    Hypokalemia    Medicare annual wellness visit, subsequent    Encounter for hepatitis C virus screening test for high risk patient    Tattoos    Abdominal pain    Bug bite    Hypertension    Dizziness    Gastroesophageal reflux disease without esophagitis    Seasonal allergies    Rash    Annual physical exam       Past Medical History:   Diagnosis Date    Anxiety     Last assessed 10/18/16    Asthma     Last assessed 03/15/13    Closed fracture of nasal bone 4/24/2018    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    Intermittent explosive disorder     Oppositional defiant disorder     Palpitation 2/13/2019    Schizophrenia ([facility] [place] [facility] )     last assessed 12/04/17    Scoliosis        Past Surgical History:   Procedure Laterality Date    HERNIA REPAIR      NO PAST SURGERIES      [place]APPENDE[place]OMY N/A 1/9/2019    Procedure: APPENDECTOMY LAPAROSCOPIC;  Surgeon: Felix Garner MD;  [facility];  Service: General    [place] LAP,DIAGNOSTIC ABDOMEN N/A 1/9/2019    Procedure: LAPAROSCOPY DIAGNOSTIC;  Surgeon: Felix Garner MD;  [facility];  Service: General       No current outpatient medications on file      No Known Allergies    Social History     Socioeconomic History    Marital status: Single     Spouse name: None    Number of children: None    Years of education: None    Highest education level: None   Occupational History    Occupation: disabled   Social Needs    Financial resource strain: None    Food insecurity:     Worry: None     Inability: None    Transportation needs:     Medical: None     Non-medical: None   Tobacco Use    Smoking status: Current Every Day Smoker     Packs/day: 0 50     Years: 4 00     Pack years: 2 00     Types: Cigarettes    Smokeless tobacco: Never Used   Substance and Sexual Activity    Alcohol use: Yes     Comment: occasional    Drug use: Not Currently     Comment: previous Marijuana use     Sexual activity: None   Lifestyle    Physical activity:     Days per week: None     Minutes per session: None    Stress: None   Relationships    Social connections:     Talks on phone: None     Gets together: None     Attends Shinto service: None     Active member of club or organization: None     Attends meetings of clubs or organizations: None     Relationship status: None    Intimate partner violence:     Fear of current or ex partner: None     Emotionally abused: None     Physically abused: None     Forced sexual activity: None   Other Topics Concern    None   Social History Narrative    No preference on Shinto belief       Family History   Problem Relation Age of Onset    Diabetes Mother     Bipolar disorder Family     Schizophrenia Family     Diabetes type II Family        Immunization History   Administered Date(s) Administered    DTP 1995, 1995, 1995, 06/09/1995, 07/29/1998    DTaP 1995, 1994, 1994, 06/09/1995, 07/29/1998    Hep A, ped/adol, 2 dose 11/04/2008    Hep B, Adolescent or Pediatric 1994, 1994, 1994    Hepatitis A 01/04/2008, 10/20/2011    HiB 1995, 1995, 1994, 06/09/1995    INFLUENZA 12/05/2005, 01/04/2008, 10/28/2011    IPV 1995, 1994, 1994, 07/29/1998    MMR 1994, 03/10/1995, 07/29/1998    Meningococcal MCV4P 12/05/2005    Td (adult), Unspecified 12/05/2005    Td (adult), adsorbed 12/05/2005    Tdap 01/04/2008, 04/14/2018    Tuberculin Skin Test-PPD Intradermal 02/14/2005, 03/15/2013, 10/18/2016, 12/10/2018    Varicella 05/14/1997, 04/14/2011, 10/20/2011        Health Maintenance   Topic Date Due    Pneumococcal Vaccine: Pediatrics (0 to 5 Years) and At-Risk Patients (6 to 59 Years) (1 of 1 - PPSV23) 02/19/2000    HIV Screening  02/19/2009    Influenza Vaccine  10/09/2020 (Originally 7/1/2019)    Medicare Annual Wellness Visit (AWV)  12/13/2020    BMI: Adult  12/13/2020    DTaP,Tdap,and Td Vaccines (8 - Td) 04/14/2028    Pneumococcal Vaccine: 65+ Years (1 of 2 - PCV13) 02/19/2059    Hepatitis C Screening  Completed    HIB Vaccine  Completed    Hepatitis B Vaccine  Completed    IPV Vaccine  Completed    Hepatitis A Vaccine  Completed    Meningococcal ACWY Vaccine  Aged Out    HPV Vaccine  Aged Out       Objective:  Vitals:    03/02/20 1430   BP: 142/88   BP Location: Left arm   Patient Position: Sitting   Cuff Size: Standard   Pulse: 61   Temp: 98 8 °F (37 1 °C)   TempSrc: Tympanic   SpO2: 98%   Weight: 70 3 kg (155 lb)   Height: 5' 11 5" (1 816 m)     Wt Readings from Last 3 Encounters:   03/02/20 70 3 kg (155 lb)   12/13/19 68 4 kg (150 lb 12 8 oz)   10/09/19 70 7 kg (155 lb 12 8 oz)     Body mass index is 21 32 kg/m²  No exam data present       Physical Exam   Constitutional: He is oriented to person, place, and time  He appears well-developed and well-nourished  No distress  HENT:   Head: Normocephalic and atraumatic  Right Ear: External ear normal    Left Ear: External ear normal    Nose: Nose normal    Mouth/Throat: Oropharynx is clear and moist  No oropharyngeal exudate  Eyes: Pupils are equal, round, and reactive to light  Conjunctivae and EOM are normal  Right eye exhibits no discharge  Left eye exhibits no discharge  Neck: Normal range of motion  Neck supple  No thyromegaly present  Cardiovascular: Normal rate, regular rhythm, normal heart sounds and intact distal pulses  Exam reveals no gallop and no friction rub  No murmur heard  Pulmonary/Chest: Effort normal and breath sounds normal  No stridor  No respiratory distress  He has no wheezes  He has no rales  Abdominal: Soft  Bowel sounds are normal  He exhibits no distension  There is no tenderness  Lymphadenopathy:     He has no cervical adenopathy  Neurological: He is alert and oriented to person, place, and time  Skin: Skin is warm and dry  No rash noted  He is not diaphoretic  No erythema  Psychiatric: He has a normal mood and affect  His behavior is normal  Judgment and thought content normal            Future Appointments   Date Time Provider Presley Walker   9/2/2020  2:30 PM Ursula Andrews [address]    Patient Care Team:  Ursula Andrews as PCP - General (Family Medicine)

## 2020-03-02 NOTE — ASSESSMENT & PLAN NOTE
Continue with well balanced diet, increase daily exercise, cut back on alcohol intake, discussed smoking cessation with patient   Encouraged monthly testicular exam

## 2020-03-10 ENCOUNTER — APPOINTMENT (EMERGENCY)
Dept: CT IMAGING | Facility: HOSPITAL | Age: 25
End: 2020-03-10
Payer: MEDICARE

## 2020-03-10 ENCOUNTER — HOSPITAL ENCOUNTER (EMERGENCY)
Facility: HOSPITAL | Age: 25
Discharge: HOME | End: 2020-03-10
Attending: EMERGENCY MEDICINE | Admitting: EMERGENCY MEDICINE
Payer: MEDICARE

## 2020-03-10 VITALS
BODY MASS INDEX: 22.07 KG/M2 | RESPIRATION RATE: 18 BRPM | TEMPERATURE: 97.3 F | HEART RATE: 110 BPM | OXYGEN SATURATION: 99 % | WEIGHT: 160.5 LBS | SYSTOLIC BLOOD PRESSURE: 147 MMHG | DIASTOLIC BLOOD PRESSURE: 91 MMHG

## 2020-03-10 DIAGNOSIS — S09.90XA INJURY OF HEAD, INITIAL ENCOUNTER: Primary | ICD-10-CM

## 2020-03-10 DIAGNOSIS — S00.01XA SCALP ABRASION: ICD-10-CM

## 2020-03-10 LAB
ALBUMIN SERPL BCP-MCNC: 5 G/DL (ref 3–5.2)
ALP SERPL-CCNC: 61 U/L (ref 43–122)
ALT SERPL W P-5'-P-CCNC: 32 U/L (ref 9–52)
ANION GAP SERPL CALCULATED.3IONS-SCNC: 13 MMOL/L (ref 5–14)
AST SERPL W P-5'-P-CCNC: 26 U/L (ref 17–59)
BASOPHILS # BLD AUTO: 0.1 THOUSANDS/ΜL (ref 0–0.1)
BASOPHILS NFR BLD AUTO: 1 % (ref 0–1)
BILIRUB SERPL-MCNC: 0.5 MG/DL
BUN SERPL-MCNC: 14 MG/DL (ref 5–25)
CALCIUM SERPL-MCNC: 9.9 MG/DL (ref 8.4–10.2)
CHLORIDE SERPL-SCNC: 101 MMOL/L (ref 97–108)
CO2 SERPL-SCNC: 26 MMOL/L (ref 22–30)
CREAT SERPL-MCNC: 0.69 MG/DL (ref 0.7–1.5)
EOSINOPHIL # BLD AUTO: 0.3 THOUSAND/ΜL (ref 0–0.4)
EOSINOPHIL NFR BLD AUTO: 3 % (ref 0–6)
ERYTHROCYTE [DISTWIDTH] IN BLOOD BY AUTOMATED COUNT: 13 %
ETHANOL SERPL-MCNC: 95 MG/DL (ref 0–10)
GFR SERPL CREATININE-BSD FRML MDRD: 131 ML/MIN/1.73SQ M
GLUCOSE SERPL-MCNC: 91 MG/DL (ref 70–99)
HCT VFR BLD AUTO: 49.3 % (ref 41–53)
HGB BLD-MCNC: 17.1 G/DL (ref 13.5–17.5)
LYMPHOCYTES # BLD AUTO: 3.2 THOUSANDS/ΜL (ref 0.5–4)
LYMPHOCYTES NFR BLD AUTO: 29 % (ref 25–45)
MCH RBC QN AUTO: 32.9 PG (ref 26–34)
MCHC RBC AUTO-ENTMCNC: 34.7 G/DL (ref 31–36)
MCV RBC AUTO: 95 FL (ref 80–100)
MONOCYTES # BLD AUTO: 0.6 THOUSAND/ΜL (ref 0.2–0.9)
MONOCYTES NFR BLD AUTO: 6 % (ref 1–10)
NEUTROPHILS # BLD AUTO: 7 THOUSANDS/ΜL (ref 1.8–7.8)
NEUTS SEG NFR BLD AUTO: 63 % (ref 45–65)
PLATELET # BLD AUTO: 191 THOUSANDS/UL (ref 150–450)
PMV BLD AUTO: 8.3 FL (ref 8.9–12.7)
POTASSIUM SERPL-SCNC: 3.3 MMOL/L (ref 3.6–5)
PROT SERPL-MCNC: 8 G/DL (ref 5.9–8.4)
RBC # BLD AUTO: 5.19 MILLION/UL (ref 4.5–5.9)
SODIUM SERPL-SCNC: 140 MMOL/L (ref 137–147)
WBC # BLD AUTO: 11.2 THOUSAND/UL (ref 4.5–11)

## 2020-03-10 PROCEDURE — 99284 EMERGENCY DEPT VISIT MOD MDM: CPT

## 2020-03-10 PROCEDURE — 90715 TDAP VACCINE 7 YRS/> IM: CPT | Performed by: EMERGENCY MEDICINE

## 2020-03-10 PROCEDURE — 99284 EMERGENCY DEPT VISIT MOD MDM: CPT | Performed by: EMERGENCY MEDICINE

## 2020-03-10 PROCEDURE — 70450 CT HEAD/BRAIN W/O DYE: CPT

## 2020-03-10 PROCEDURE — 85025 COMPLETE CBC W/AUTO DIFF WBC: CPT | Performed by: EMERGENCY MEDICINE

## 2020-03-10 PROCEDURE — 36415 COLL VENOUS BLD VENIPUNCTURE: CPT | Performed by: EMERGENCY MEDICINE

## 2020-03-10 PROCEDURE — 80320 DRUG SCREEN QUANTALCOHOLS: CPT | Performed by: EMERGENCY MEDICINE

## 2020-03-10 PROCEDURE — 80053 COMPREHEN METABOLIC PANEL: CPT | Performed by: EMERGENCY MEDICINE

## 2020-03-10 PROCEDURE — 90471 IMMUNIZATION ADMIN: CPT

## 2020-03-10 PROCEDURE — 96360 HYDRATION IV INFUSION INIT: CPT

## 2020-03-10 PROCEDURE — 72125 CT NECK SPINE W/O DYE: CPT

## 2020-03-10 RX ORDER — GINSENG 100 MG
1 CAPSULE ORAL 2 TIMES DAILY
Qty: 28 G | Refills: 0 | Status: SHIPPED | OUTPATIENT
Start: 2020-03-10 | End: 2021-09-29

## 2020-03-10 RX ADMIN — TETANUS TOXOID, REDUCED DIPHTHERIA TOXOID AND ACELLULAR PERTUSSIS VACCINE, ADSORBED 0.5 ML: 5; 2.5; 8; 8; 2.5 SUSPENSION INTRAMUSCULAR at 19:37

## 2020-03-10 RX ADMIN — SODIUM CHLORIDE 1000 ML: 0.9 INJECTION, SOLUTION INTRAVENOUS at 18:59

## 2020-03-10 NOTE — ED PROVIDER NOTES
History  Chief Complaint   Patient presents with    Head Injury     pt reports he had a few beers and fell hitting his head on a guard rail, pt denies LOC     Patient is a 76-year-old male who is admits to history of alcohol abuse, presents via EMS for a fall where he states he struck his head on a guard rail  He states that he only ate 2 pieces of pizza today and then had his usual he several cans of beer and 40 oz of her a cane of alcohol  He states that he thinks he lost his balance while on the guard rail and struck his head  Denies loss of consciousness, nausea vomiting since injury  Denies any neck pain, chest pain trouble breathing  Denies pain anywhere else in his body  He is unsure of his last tetanus shot  History provided by:  Patient   used: No        None       Past Medical History:   Diagnosis Date    Anxiety     Last assessed 10/18/16    Asthma     Last assessed 03/15/13    Closed fracture of nasal bone 4/24/2018    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    Intermittent explosive disorder     Oppositional defiant disorder     Palpitation 2/13/2019    [facility] )     last assessed 12/04/17    Scoliosis        Past Surgical History:   Procedure Laterality Date    HERNIA REPAIR      NO PAST SURGERIES      [place] LAP,APPENDECTOMY N/A 1/9/2019    Procedure: APPENDECTOMY LAPAROSCOPIC;  Surgeon: Natividad Wood MD;  [facility];  Service: General    [place] LAP,DIAGNOSTIC ABDOMEN N/A 1/9/2019    Procedure: LAPAROSCOPY DIAGNOSTIC;  Surgeon: Natividad Wood MD;  [facility];  Service: General       Family History   Problem Relation Age of Onset    Diabetes Mother     Bipolar disorder Family     Schizophrenia Family     Diabetes type II Family      I have reviewed and agree with the history as documented      E-Cigarette/Vaping     E-Cigarette/Vaping Substances     Social History     Tobacco Use    Smoking status: Current Every Day Smoker     Packs/day: 0 50     Years: 4 00     Pack years: 2 00     Types: Cigarettes    Smokeless tobacco: Never Used   Substance Use Topics    Alcohol use: Yes     Comment: occasional    Drug use: Not Currently     Comment: previous Marijuana use        Review of Systems   Constitutional: Negative  Negative for chills and fever  HENT: Negative  Negative for rhinorrhea, sore throat, trouble swallowing and voice change  Eyes: Negative  Negative for pain and visual disturbance  Respiratory: Negative  Negative for cough, shortness of breath and wheezing  Cardiovascular: Negative  Negative for chest pain and palpitations  Gastrointestinal: Negative for abdominal pain, diarrhea, nausea and vomiting  Genitourinary: Negative  Negative for dysuria and frequency  Musculoskeletal: Negative  Negative for neck pain and neck stiffness  Skin: Positive for wound  Negative for rash  Neurological: Positive for headaches  Negative for dizziness, speech difficulty, weakness, light-headedness and numbness  Physical Exam  Physical Exam   Constitutional: He is oriented to person, place, and time  He appears well-developed and well-nourished  No distress  HENT:   Head: Normocephalic and atraumatic  Mouth/Throat: Oropharynx is clear and moist    Eyes: Pupils are equal, round, and reactive to light  Conjunctivae and EOM are normal    Neck: Normal range of motion  Neck supple  No tracheal deviation present  Cardiovascular: Normal rate, regular rhythm and intact distal pulses  Pulmonary/Chest: Effort normal and breath sounds normal  No respiratory distress  He has no wheezes  He has no rales  Abdominal: Soft  Bowel sounds are normal  He exhibits no distension  There is no tenderness  There is no rebound and no guarding  Musculoskeletal: Normal range of motion  He exhibits no tenderness or deformity  Neurological: He is alert and oriented to person, place, and time  Skin: Skin is warm and dry   Capillary refill takes less than 2 seconds  Abrasion noted  No rash noted  Abrasion to the top of the patient's forehead  No active hemorrhage appreciated   Psychiatric: He has a normal mood and affect  His behavior is normal    Nursing note and vitals reviewed  Vital Signs  ED Triage Vitals [03/10/20 1838]   Temperature Pulse Respirations Blood Pressure SpO2   (!) 97 3 °F (36 3 °C) (!) 110 18 147/91 99 %      Temp Source Heart Rate Source Patient Position - Orthostatic VS BP Location FiO2 (%)   Tympanic Monitor Lying Left arm --      Pain Score       6           Vitals:    03/10/20 1838   BP: 147/91   Pulse: (!) 110   Patient Position - Orthostatic VS: Lying         Visual Acuity  Visual Acuity      Most Recent Value   L Pupil Size (mm)  3   R Pupil Size (mm)  3          ED Medications  Medications   sodium chloride 0 9 % bolus 1,000 mL (has no administration in time range)   tetanus-diphtheria-acellular pertussis (BOOSTRIX) IM injection 0 5 mL (has no administration in time range)       Diagnostic Studies  Results Reviewed     Procedure Component Value Units Date/Time    CBC and differential [[id number]]     Lab Status:  No result Specimen:  Blood     Comprehensive metabolic panel [[id number]]     Lab Status:  No result Specimen:  Blood     Ethanol [[id number]]     Lab Status:  No result Specimen:  Blood                  CT head without contrast    (Results Pending)   CT spine cervical without contrast    (Results Pending)              Procedures  Procedures         ED Course                               MDM  Number of Diagnoses or Management Options  Injury of head, initial encounter:   Scalp abrasion:   Diagnosis management comments: Patient is a 14-year-old male presents for concerns of fall and head abrasion  He admits to drinking alcohol today  Will obtain CT scan of the head and neck to rule out fracture as he cannot be cleared due to his intoxication  Will check basic blood work, provide IV hydration    Will update tetanus  Disposition pending results of his imaging and sobriety  Patient's alcohol level was under the legal limit, CT scans of head neck were negative for acute pathology  Repeat neuro evaluation was negative for any abnormalities  Patient is able to ambulate around the emergency depart without difficulty  Reviewed the need for follow-up care and strict return precautions  I have reviewed all the patient's pertinent blood work, imaging results and other testing and their evaluation here in the emergency room  They verbalized understanding of the testing today and have no further questions or concerns regarding the care here in the emergency room  He will follow up with her primary care physician as well as with any specialist in their discharge instructions  Strict return precautions were discussed  Amount and/or Complexity of Data Reviewed  Clinical lab tests: ordered and reviewed  Tests in the radiology section of CPT®: ordered and reviewed  Tests in the medicine section of CPT®: ordered and reviewed          Disposition  Final diagnoses:   None     ED Disposition     None      Follow-up Information    None         Patient's Medications    No medications on file     No discharge procedures on file      PDMP Review     None          ED Provider  Electronically Signed by           Marialuisa Culp DO  03/10/20 3686

## 2020-06-23 ENCOUNTER — TELEPHONE (OUTPATIENT)
Dept: INTERNAL MEDICINE CLINIC | Facility: CLINIC | Age: 25
End: 2020-06-23

## 2020-06-29 ENCOUNTER — OFFICE VISIT (OUTPATIENT)
Dept: INTERNAL MEDICINE CLINIC | Age: 25
End: 2020-06-29
Payer: MEDICARE

## 2020-06-29 VITALS
HEIGHT: 72 IN | OXYGEN SATURATION: 98 % | WEIGHT: 172 LBS | BODY MASS INDEX: 23.3 KG/M2 | SYSTOLIC BLOOD PRESSURE: 152 MMHG | TEMPERATURE: 98.3 F | HEART RATE: 91 BPM | DIASTOLIC BLOOD PRESSURE: 90 MMHG

## 2020-06-29 DIAGNOSIS — F20.9 SCHIZOPHRENIA, UNSPECIFIED TYPE (HCC): ICD-10-CM

## 2020-06-29 DIAGNOSIS — L70.9 ACNE, UNSPECIFIED ACNE TYPE: Primary | ICD-10-CM

## 2020-06-29 DIAGNOSIS — I10 ESSENTIAL HYPERTENSION: ICD-10-CM

## 2020-06-29 DIAGNOSIS — F10.10 ALCOHOL ABUSE: ICD-10-CM

## 2020-06-29 PROCEDURE — 99214 OFFICE O/P EST MOD 30 MIN: CPT | Performed by: NURSE PRACTITIONER

## 2020-06-29 PROCEDURE — 3008F BODY MASS INDEX DOCD: CPT | Performed by: NURSE PRACTITIONER

## 2020-06-29 PROCEDURE — 3080F DIAST BP >= 90 MM HG: CPT | Performed by: NURSE PRACTITIONER

## 2020-06-29 PROCEDURE — 3077F SYST BP >= 140 MM HG: CPT | Performed by: NURSE PRACTITIONER

## 2020-06-29 PROCEDURE — 4004F PT TOBACCO SCREEN RCVD TLK: CPT | Performed by: NURSE PRACTITIONER

## 2020-06-29 RX ORDER — CLINDAMYCIN AND BENZOYL PEROXIDE 10; 50 MG/G; MG/G
GEL TOPICAL 2 TIMES DAILY
Qty: 25 G | Refills: 3 | Status: SHIPPED | OUTPATIENT
Start: 2020-06-29 | End: 2020-10-26 | Stop reason: SDUPTHER

## 2020-07-28 ENCOUNTER — TELEPHONE (OUTPATIENT)
Dept: PSYCHIATRY | Facility: CLINIC | Age: 25
End: 2020-07-28

## 2020-08-04 ENCOUNTER — TELEPHONE (OUTPATIENT)
Dept: PSYCHIATRY | Facility: CLINIC | Age: 25
End: 2020-08-04

## 2020-08-11 ENCOUNTER — TELEPHONE (OUTPATIENT)
Dept: PSYCHIATRY | Facility: CLINIC | Age: 25
End: 2020-08-11

## 2020-10-26 DIAGNOSIS — L70.9 ACNE, UNSPECIFIED ACNE TYPE: ICD-10-CM

## 2020-10-26 RX ORDER — CLINDAMYCIN AND BENZOYL PEROXIDE 10; 50 MG/G; MG/G
GEL TOPICAL 2 TIMES DAILY
Qty: 25 G | Refills: 3 | Status: SHIPPED | OUTPATIENT
Start: 2020-10-26 | End: 2020-10-29

## 2020-10-28 ENCOUNTER — TELEPHONE (OUTPATIENT)
Dept: INTERNAL MEDICINE CLINIC | Facility: CLINIC | Age: 25
End: 2020-10-28

## 2020-10-28 DIAGNOSIS — L70.9 ACNE, UNSPECIFIED ACNE TYPE: Primary | ICD-10-CM

## 2020-10-29 RX ORDER — CLINDAMYCIN PHOSPHATE 10 UG/ML
LOTION TOPICAL 2 TIMES DAILY
Qty: 60 ML | Refills: 0 | Status: SHIPPED | OUTPATIENT
Start: 2020-10-29 | End: 2021-09-29

## 2020-11-30 ENCOUNTER — OFFICE VISIT (OUTPATIENT)
Dept: INTERNAL MEDICINE CLINIC | Facility: CLINIC | Age: 25
End: 2020-11-30
Payer: MEDICARE

## 2020-11-30 VITALS
HEIGHT: 72 IN | SYSTOLIC BLOOD PRESSURE: 152 MMHG | BODY MASS INDEX: 24.41 KG/M2 | HEART RATE: 102 BPM | TEMPERATURE: 98.9 F | WEIGHT: 180.2 LBS | DIASTOLIC BLOOD PRESSURE: 88 MMHG | OXYGEN SATURATION: 98 %

## 2020-11-30 DIAGNOSIS — N34.2 URETHRITIS: Primary | ICD-10-CM

## 2020-11-30 DIAGNOSIS — R30.0 DYSURIA: ICD-10-CM

## 2020-11-30 LAB
SL AMB  POCT GLUCOSE, UA: NEGATIVE
SL AMB LEUKOCYTE ESTERASE,UA: ABNORMAL
SL AMB POCT BILIRUBIN,UA: NEGATIVE
SL AMB POCT BLOOD,UA: NEGATIVE
SL AMB POCT CLARITY,UA: CLEAR
SL AMB POCT COLOR,UA: ABNORMAL
SL AMB POCT KETONES,UA: NEGATIVE
SL AMB POCT NITRITE,UA: NEGATIVE
SL AMB POCT PH,UA: 7
SL AMB POCT SPECIFIC GRAVITY,UA: 1.01
SL AMB POCT URINE PROTEIN: NEGATIVE
SL AMB POCT UROBILINOGEN: ABNORMAL

## 2020-11-30 PROCEDURE — 81003 URINALYSIS AUTO W/O SCOPE: CPT | Performed by: INTERNAL MEDICINE

## 2020-11-30 PROCEDURE — 87591 N.GONORRHOEAE DNA AMP PROB: CPT | Performed by: INTERNAL MEDICINE

## 2020-11-30 PROCEDURE — 99213 OFFICE O/P EST LOW 20 MIN: CPT | Performed by: INTERNAL MEDICINE

## 2020-11-30 PROCEDURE — 87491 CHLMYD TRACH DNA AMP PROBE: CPT | Performed by: INTERNAL MEDICINE

## 2020-11-30 PROCEDURE — 87529 HSV DNA AMP PROBE: CPT | Performed by: INTERNAL MEDICINE

## 2020-11-30 RX ORDER — AZITHROMYCIN 250 MG/1
1000 TABLET, FILM COATED ORAL ONCE
Qty: 4 TABLET | Refills: 0 | Status: SHIPPED | OUTPATIENT
Start: 2020-11-30 | End: 2020-11-30

## 2020-12-03 LAB
C TRACH DNA SPEC QL NAA+PROBE: NEGATIVE
N GONORRHOEA DNA SPEC QL NAA+PROBE: NEGATIVE

## 2020-12-04 LAB
HSV1 DNA SPEC QL NAA+PROBE: NEGATIVE
HSV2 DNA SPEC QL NAA+PROBE: NEGATIVE

## 2021-01-06 ENCOUNTER — OFFICE VISIT (OUTPATIENT)
Dept: INTERNAL MEDICINE CLINIC | Facility: CLINIC | Age: 26
End: 2021-01-06
Payer: MEDICARE

## 2021-01-06 VITALS
OXYGEN SATURATION: 97 % | BODY MASS INDEX: 23.36 KG/M2 | DIASTOLIC BLOOD PRESSURE: 88 MMHG | WEIGHT: 182 LBS | HEART RATE: 94 BPM | HEIGHT: 74 IN | SYSTOLIC BLOOD PRESSURE: 136 MMHG | TEMPERATURE: 99.1 F

## 2021-01-06 DIAGNOSIS — K21.9 GASTROESOPHAGEAL REFLUX DISEASE WITHOUT ESOPHAGITIS: ICD-10-CM

## 2021-01-06 DIAGNOSIS — R21 RASH: Primary | ICD-10-CM

## 2021-01-06 DIAGNOSIS — I10 ESSENTIAL HYPERTENSION: ICD-10-CM

## 2021-01-06 DIAGNOSIS — F20.9 SCHIZOPHRENIA, UNSPECIFIED TYPE (HCC): ICD-10-CM

## 2021-01-06 PROCEDURE — 99214 OFFICE O/P EST MOD 30 MIN: CPT | Performed by: NURSE PRACTITIONER

## 2021-01-06 NOTE — PROGRESS NOTES
Assessment/Plan:    Gastroesophageal reflux disease without esophagitis   You may use OTC tums as needed  Recommend small frequent meals throughout the day  Avoid aggravating foods - spicy foods, acidic foods - such as tomato and citrus  Avoid alcohol  Do not lay down for at least 30 mins after eating  Hypertension    Patient has been noncompliant with his blood pressure medication, had long discussion with patient on the importance of continuing to take his blood pressure medication  Continue current regimen -   Continue to monitor blood pressure at home  Goal BP is < 130/80  Contact our office for consistent elevations  Recommend low sodium diet  Exercise 30 minutes five times a week as tolerated  Recommend yearly eye exam   Patient refuses BP medication  Schizophrenia [facility])    Patient has been noncompliant with his Zoloft, he does not take his medication, patient is currently being followed closely by Psychology, patient was given referral to psychologist   Patient currently has psychologist but would like a 2nd opinion  Patient refuses help  Diagnoses and all orders for this visit:    Rash  -     hydrocortisone 2 5 % ointment; Apply topically 2 (two) times a day    Schizophrenia, unspecified type ([address] )    Gastroesophageal reflux disease without esophagitis    Essential hypertension          Subjective:      Patient ID: Álvaro Whatley is a 31 y o  male  Patient presents today to follow-up on hypertension and schizophrenia  Patient currently follow Psychology as well as Psychiatry, patient was started on Zoloft a little while back but has stopped taking it approximately 2-3 months ago as he states he "does not need it"  Patient states he has been going to the gym more which has been helping, and has been hanging out with friends  However patient has complaints of trouble with concentration  Patient has no other new complaints    Patient also reports he has not been taking his blood pressure medication  Patient reports his father recently passed away unexpectily and he is not coping well, and has been binge drinking  [place]  Saw neurologist,will follow up with patient in a year  Patient was concerned for Parkinson's disease  Patient reports a rash to his left hip  He reports that he has had the rash since the summer  He denies pain or itchyness  Hypertension  This is a chronic problem  The current episode started more than 1 year ago  The problem has been waxing and waning since onset  The problem is uncontrolled  Pertinent negatives include no chest pain, headaches, neck pain, palpitations or shortness of breath  Risk factors for coronary artery disease include male gender  Compliance problems include exercise and diet  The following portions of the patient's history were reviewed and updated as appropriate: allergies, current medications, past family history, past medical history, past social history, past surgical history and problem list     Review of Systems   Constitutional: Negative for activity change, appetite change, chills, diaphoresis and fever  HENT: Negative for congestion, ear discharge, ear pain, postnasal drip, rhinorrhea, sinus pressure, sinus pain and sore throat  Eyes: Negative for pain, discharge, itching and visual disturbance  Respiratory: Negative for cough, chest tightness, shortness of breath and wheezing  Cardiovascular: Negative for chest pain, palpitations and leg swelling  Gastrointestinal: Negative for abdominal pain, constipation, diarrhea, nausea and vomiting  Endocrine: Negative for polydipsia, polyphagia and polyuria  Genitourinary: Negative for difficulty urinating, dysuria and urgency  Musculoskeletal: Negative for arthralgias, back pain and neck pain  Skin: Positive for rash  Negative for wound     Neurological: Negative for dizziness, weakness, numbness and headaches           Past Medical History:   Diagnosis Date    Anxiety     Last assessed 10/18/16    Asthma     Last assessed 03/15/13    Closed fracture of nasal bone 4/24/2018    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    Intermittent explosive disorder     Oppositional defiant disorder     Palpitation 2/13/2019    Schizophrenia ([facility]     last assessed 12/04/17    Scoliosis          Current Outpatient Medications:     APPLE CIDER VINEGAR PO, Take by mouth, Disp: , Rfl:     bacitracin topical ointment 500 units/g topical ointment, Apply 1 large application topically 2 (two) times a day, Disp: 28 g, Rfl: 0    benzoyl peroxide 5 % external liquid, Apply topically 2 (two) times a day, Disp: 1 Bottle, Rfl: 1    clindamycin (CLEOCIN T) 1 % lotion, Apply topically 2 (two) times a day, Disp: 60 mL, Rfl: 0    ELDERBERRY PO, Take by mouth, Disp: , Rfl:     hydrocortisone 2 5 % ointment, Apply topically 2 (two) times a day, Disp: 30 g, Rfl: 0    Allergies   Allergen Reactions    Other      Seasonal- sneezing, congestion       Social History   Past Surgical History:   Procedure Laterality Date    HERNIA REPAIR      NO PAST SURGERIES      [place] LAP,APPENDECTOMY N/A 1/9/2019    Procedure: APPENDECTOMY LAPAROSCOPIC;  Surgeon: Mounika Suggs MD;  [facility];  Service: General    [place] LAP,DIAGNOSTIC ABDOMEN N/A 1/9/2019    Procedure: LAPAROSCOPY DIAGNOSTIC;  Surgeon: Mounika Suggs MD;  [facility];  Service: General     Family History   Problem Relation Age of Onset    Diabetes Mother     Bipolar disorder Family     Schizophrenia Family     Diabetes type II Family        Objective:  /88 (BP Location: Left arm, Patient Position: Sitting, Cuff Size: Large)   Pulse 94   Temp 99 1 °F (37 3 °C)   Ht 6' 2" (1 88 m)   Wt 82 6 kg (182 lb)   SpO2 97%   BMI 23 37 kg/m²     Recent Results (from the past 1344 hour(s))   POCT urine dip auto non-scope    Collection Time: 11/30/20  3:28 PM   Result Value Ref Range     COLOR,UA pink     CLARITY,UA clear     SPECIFIC GRAVITY,UA 1 015      PH,UA 7 0     LEUKOCYTE ESTERASE,UA trace     NITRITE,UA negative     GLUCOSE, UA negative     KETONES,UA negative     BILIRUBIN,UA negative     BLOOD,UA negative     POCT URINE PROTEIN negative     SL AMB POCT UROBILINOGEN 0 2 EU/dil    HSV TYPE 1,2 DNA PCR    Collection Time: 11/30/20  3:32 PM   Result Value Ref Range    HSV 1, PCR Negative Negative    HSV 2 , PCR Negative Negative   Chlamydia/GC amplified DNA by PCR    Collection Time: 11/30/20  3:32 PM    Specimen: Urine, Other   Result Value Ref Range    N gonorrhoeae, DNA Probe Negative Negative    Chlamydia trachomatis, DNA Probe Negative Negative            Physical Exam  Vitals signs reviewed  Constitutional:       General: He is not in acute distress  Appearance: He is not diaphoretic  HENT:      Head: Normocephalic and atraumatic  Cardiovascular:      Rate and Rhythm: Normal rate and regular rhythm  Pulmonary:      Effort: Pulmonary effort is normal  No respiratory distress  Breath sounds: Normal breath sounds  No wheezing, rhonchi or rales  Abdominal:      General: Bowel sounds are normal  There is no distension  Palpations: Abdomen is soft  Tenderness: There is no abdominal tenderness  Musculoskeletal:      Right lower leg: No edema  Left lower leg: No edema  Skin:     General: Skin is warm and dry  Neurological:      Mental Status: He is alert  Mental status is at baseline     Psychiatric:         Mood and Affect: Mood normal          Behavior: Behavior normal

## 2021-01-08 NOTE — ASSESSMENT & PLAN NOTE
Patient has been noncompliant with his Zoloft, he does not take his medication, patient is currently being followed closely by Psychology, patient was given referral to psychologist   Patient currently has psychologist but would like a 2nd opinion  Patient refuses help

## 2021-01-08 NOTE — ASSESSMENT & PLAN NOTE
Patient has been noncompliant with his blood pressure medication, had long discussion with patient on the importance of continuing to take his blood pressure medication  Continue current regimen -   Continue to monitor blood pressure at home  Goal BP is < 130/80  Contact our office for consistent elevations  Recommend low sodium diet  Exercise 30 minutes five times a week as tolerated  Recommend yearly eye exam   Patient refuses BP medication

## 2021-03-05 ENCOUNTER — OFFICE VISIT (OUTPATIENT)
Dept: INTERNAL MEDICINE CLINIC | Age: 26
End: 2021-03-05
Payer: MEDICARE

## 2021-03-05 VITALS
TEMPERATURE: 98.3 F | BODY MASS INDEX: 22.72 KG/M2 | HEART RATE: 85 BPM | DIASTOLIC BLOOD PRESSURE: 60 MMHG | HEIGHT: 74 IN | SYSTOLIC BLOOD PRESSURE: 132 MMHG | OXYGEN SATURATION: 98 % | WEIGHT: 177 LBS

## 2021-03-05 DIAGNOSIS — Z00.00 MEDICARE ANNUAL WELLNESS VISIT, SUBSEQUENT: Primary | ICD-10-CM

## 2021-03-05 DIAGNOSIS — Z13.228 SCREENING FOR METABOLIC DISORDER: ICD-10-CM

## 2021-03-05 PROCEDURE — G0439 PPPS, SUBSEQ VISIT: HCPCS | Performed by: NURSE PRACTITIONER

## 2021-03-05 NOTE — PROGRESS NOTES
Assessment and Plan:     Problem List Items Addressed This Visit        Other    Medicare annual wellness visit, subsequent - Primary      Other Visit Diagnoses     Screening for metabolic disorder        Relevant Orders    Comprehensive metabolic panel    CBC and differential    Lipid panel           Preventive health issues were discussed with patient, and age appropriate screening tests were ordered as noted in patient's After Visit Summary  Personalized health advice and appropriate referrals for health education or preventive services given if needed, as noted in patient's After Visit Summary  History of Present Illness:     Patient presents for Medicare Annual Wellness visit    Patient Care Team:  Celso Andrews as PCP - General (Family Medicine)     Problem List:     Patient Active Problem List   Diagnosis    Attention deficit hyperactivity disorder    Intellectual disability    Psychosis, bipolar affective ([address] )    Schizophrenia ([facility] )    Scoliosis    Tourette's syndrome    Chronic midline low back pain without sciatica    Hypokalemia    Medicare annual wellness visit, subsequent    Encounter for hepatitis C virus screening test for high risk patient    Tattoos    Abdominal pain    Bug bite    Hypertension    Dizziness    Gastroesophageal reflux disease without esophagitis    Seasonal allergies    Acne    Annual physical exam    Alcohol abuse    Urethritis    Dysuria      Past Medical and Surgical History:     Past Medical History:   Diagnosis Date    Anxiety     Last assessed 10/18/16    Asthma     Last assessed 03/15/13    Closed fracture of nasal bone 4/24/2018    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    Intermittent explosive disorder     Oppositional defiant disorder     Palpitation 2/13/2019    Schizophrenia ([facility] )     last assessed 12/04/17    Scoliosis      Past Surgical History:   Procedure Laterality Date    HERNIA REPAIR      NO PAST SURGERIES      PA LAP,APPENDECTOMY N/A 2019    Procedure: APPENDECTOMY LAPAROSCOPIC;  Surgeon: Madina Pennington MD;  Location: [facility];  Service: General    MO LAP,DIAGNOSTIC ABDOMEN N/A 2019    Procedure: LAPAROSCOPY DIAGNOSTIC;  Surgeon: Madina Pennington MD;  Location[facility];  Service: General      Family History:     Family History   Problem Relation Age of Onset    Diabetes Mother     Bipolar disorder Family     Schizophrenia Family     Diabetes type II Family       Social History:        Social History     Socioeconomic History    Marital status: Single     Spouse name: None    Number of children: None    Years of education: None    Highest education level: None   Occupational History    Occupation: disabled   Social Needs    Financial resource strain: None    Food insecurity     Worry: None     Inability: None    Transportation needs     Medical: None     Non-medical: None   Tobacco Use    Smoking status: Former Smoker     Packs/day: 0 50     Years: 4 00     Pack years: 2 00     Types: Cigarettes     Quit date: 2021     Years since quittin 0    Smokeless tobacco: Never Used   Substance and Sexual Activity    Alcohol use: Yes     Frequency: 4 or more times a week     Drinks per session: 3 or 4     Comment: beer, wine, whiskey    Drug use: Not Currently     Comment: previous Marijuana use     Sexual activity: None   Lifestyle    Physical activity     Days per week: None     Minutes per session: None    Stress: None   Relationships    Social connections     Talks on phone: None     Gets together: None     Attends Anglican service: None     Active member of club or organization: None     Attends meetings of clubs or organizations: None     Relationship status: None    Intimate partner violence     Fear of current or ex partner: None     Emotionally abused: None     Physically abused: None     Forced sexual activity: None   Other Topics Concern    None   Social History Narrative    No preference on Ep[employer]op[employer] belief      Medications and Allergies:     Current Outpatient Medications   Medication Sig Dispense Refill    APPLE CIDER VINEGAR PO Take by mouth      bacitracin topical ointment 500 units/g topical ointment Apply 1 large application topically 2 (two) times a day 28 g 0    benzoyl peroxide 5 % external liquid Apply topically 2 (two) times a day 1 Bottle 1    clindamycin (CLEOCIN T) 1 % lotion Apply topically 2 (two) times a day 60 mL 0    ELDERBERRY PO Take by mouth      hydrocortisone 2 5 % ointment Apply topically 2 (two) times a day 30 g 0     No current facility-administered medications for this visit  Allergies   Allergen Reactions    Other      Seasonal- sneezing, congestion      Immunizations:     Immunization History   Administered Date(s) Administered    DTP 1994, 1994, 1994, 06/09/1995, 07/29/1998    DTaP 1994, 1994, 1994, 06/09/1995, 07/29/1998    Hep A, ped/adol, 2 dose 11/04/2008    Hep B, Adolescent or Pediatric 1994, 1994, 1994    Hepatitis A 01/04/2008, 10/20/2011    HiB 1994, 1994, 1994, 06/09/1995    INFLUENZA 12/05/2005, 01/04/2008, 10/28/2011    IPV 1994, 1994, 1994, 07/29/1998    MMR 1994, 03/10/1995, 07/29/1998    Meningococcal MCV4P 12/05/2005    Td (adult), Unspecified 12/05/2005    Td (adult), adsorbed 12/05/2005    Tdap 01/04/2008, 04/14/2018, 03/10/2020    Tuberculin Skin Test-PPD Intradermal 02/14/2005, 03/15/2013, 10/18/2016, 12/10/2018    Varicella 05/14/1997, 04/14/2011, 10/20/2011      Health Maintenance:         Topic Date Due    HIV Screening  02/19/2009    Hepatitis C Screening  Completed     There are no preventive care reminders to display for this patient     Medicare Health Risk Assessment:     /60 (BP Location: Right arm, Patient Position: Sitting, Cuff Size: Large)   Pulse 85   Temp 98 3 °F (36 8 °C) (Temporal)   Ht 6' 2" (1 88 m)   Wt 80 3 kg (177 lb)   SpO2 98%   BMI 22 73 kg/m²      Mya Mueller is here for his Subsequent Wellness visit  Health Risk Assessment:   Patient rates overall health as good  Patient feels that their physical health rating is same  Eyesight was rated as same  Hearing was rated as same  Patient feels that their emotional and mental health rating is same  Pain experienced in the last 7 days has been some  Patient's pain rating has been 5/10  Patient states that he has experienced no weight loss or gain in last 6 months  Depression Screening:   PHQ-2 Score: 0      Fall Risk Screening: In the past year, patient has experienced: no history of falling in past year      Home Safety:  Patient does not have trouble with stairs inside or outside of their home  Patient has working smoke alarms and has no working carbon monoxide detector  Home safety hazards include: none  Nutrition:   Current diet is Regular  Medications:   Patient is currently taking over-the-counter supplements  OTC medications include: see medication list  Patient is able to manage medications  Activities of Daily Living (ADLs)/Instrumental Activities of Daily Living (IADLs):   Walk and transfer into and out of bed and chair?: Yes  Dress and groom yourself?: Yes    Bathe or shower yourself?: Yes    Feed yourself?  Yes  Do your laundry/housekeeping?: Yes  Manage your money, pay your bills and track your expenses?: Yes  Make your own meals?: Yes    Do your own shopping?: Yes    Previous Hospitalizations:   Any hospitalizations or ED visits within the last 12 months?: No      Advance Care Planning:   Living will: No    Durable POA for healthcare: No    Advanced directive: No    Advanced directive counseling given: Yes    Five wishes given: Yes      Cognitive Screening:   Provider or family/friend/caregiver concerned regarding cognition?: No    PREVENTIVE SCREENINGS      Cardiovascular Screening:    General: Screening Current Diabetes Screening:     General: Screening Current      Colorectal Cancer Screening:     General: Screening Not Indicated      Prostate Cancer Screening:    General: Screening Not Indicated      Osteoporosis Screening:    General: Screening Not Indicated      Abdominal Aortic Aneurysm (AAA) Screening:    Risk factors include: tobacco use        General: Screening Not Indicated      Lung Cancer Screening:     General: Screening Not Indicated      Hepatitis C Screening:    General: Screening Current    Other Counseling Topics:   Alcohol use counseling, car/seat belt/driving safety, skin self-exam, sunscreen and calcium and vitamin D intake and regular weightbearing exercise         Colin Beckwith

## 2021-03-05 NOTE — PATIENT INSTRUCTIONS
Aishwarya burden    Medicare Preventive Visit Patient Instructions  Thank you for completing your Welcome to Medicare Visit or Medicare Annual Wellness Visit today  Your next wellness visit will be due in one year (3/5/2022)  The screening/preventive services that you may require over the next 5-10 years are detailed below  Some tests may not apply to you based off risk factors and/or age  Screening tests ordered at today's visit but not completed yet may show as past due  Also, please note that scanned in results may not display below  Preventive Screenings:  Service Recommendations Previous Testing/Comments   Colorectal Cancer Screening  · Colonoscopy    · Fecal Occult Blood Test (FOBT)/Fecal Immunochemical Test (FIT)  · Fecal DNA/Cologuard Test  · Flexible Sigmoidoscopy Age: 54-65 years old   Colonoscopy: every 10 years (May be performed more frequently if at higher risk)  OR  FOBT/FIT: every 1 year  OR  Cologuard: every 3 years  OR  Sigmoidoscopy: every 5 years  Screening may be recommended earlier than age 48 if at higher risk for colorectal cancer  Also, an individualized decision between you and your healthcare provider will decide whether screening between the ages of 74-80 would be appropriate   Colonoscopy: Not on file  FOBT/FIT: Not on file  Cologuard: Not on file  Sigmoidoscopy: Not on file         Prostate Cancer Screening Individualized decision between patient and health care provider in men between ages of 53-78   Medicare will cover every 12 months beginning on the day after your 50th birthday PSA: No results in last 5 years     Screening Not Indicated     Hepatitis C Screening Once for adults born between 1945 and 1965  More frequently in patients at high risk for Hepatitis C Hep C Antibody: 02/20/2019    Screening Current   Diabetes Screening 1-2 times per year if you're at risk for diabetes or have pre-diabetes Fasting glucose: 81 mg/dL   A1C: No results in last 5 years    Screening Current Cholesterol Screening Once every 5 years if you don't have a lipid disorder  May order more often based on risk factors  Lipid panel: 04/15/2019    Screening Current      Other Preventive Screenings Covered by Medicare:  1  Abdominal Aortic Aneurysm (AAA) Screening: covered once if your at risk  You're considered to be at risk if you have a family history of AAA or a male between the age of 73-68 who smoking at least 100 cigarettes in your lifetime  2  Lung Cancer Screening: covers low dose CT scan once per year if you meet all of the following conditions: (1) Age 50-69; (2) No signs or symptoms of lung cancer; (3) Current smoker or have quit smoking within the last 15 years; (4) You have a tobacco smoking history of at least 30 pack years (packs per day x number of years you smoked); (5) You get a written order from a healthcare provider  3  Glaucoma Screening: covered annually if you're considered high risk: (1) You have diabetes OR (2) Family history of glaucoma OR (3)  aged 48 and older OR (3)  American aged 72 and older  3  Osteoporosis Screening: covered every 2 years if you meet one of the following conditions: (1) Have a vertebral abnormality; (2) On glucocorticoid therapy for more than 3 months; (3) Have primary hyperparathyroidism; (4) On osteoporosis medications and need to assess response to drug therapy  5  HIV Screening: covered annually if you're between the age of 12-76  Also covered annually if you are younger than 13 and older than 72 with risk factors for HIV infection  For pregnant patients, it is covered up to 3 times per pregnancy      Immunizations:  Immunization Recommendations   Influenza Vaccine Annual influenza vaccination during flu season is recommended for all persons aged >= 6 months who do not have contraindications   Pneumococcal Vaccine (Prevnar and Pneumovax)  * Prevnar = PCV13  * Pneumovax = PPSV23 Adults 25-60 years old: 1-3 doses may be recommended based on certain risk factors  Adults 72 years old: Prevnar (PCV13) vaccine recommended followed by Pneumovax (PPSV23) vaccine  If already received PPSV23 since turning 65, then PCV13 recommended at least one year after PPSV23 dose  Hepatitis B Vaccine 3 dose series if at intermediate or high risk (ex: diabetes, end stage renal disease, liver disease)   Tetanus (Td) Vaccine - COST NOT COVERED BY MEDICARE PART B Following completion of primary series, a booster dose should be given every 10 years to maintain immunity against tetanus  Td may also be given as tetanus wound prophylaxis  Tdap Vaccine - COST NOT COVERED BY MEDICARE PART B Recommended at least once for all adults  For pregnant patients, recommended with each pregnancy  Shingles Vaccine (Shingrix) - COST NOT COVERED BY MEDICARE PART B  2 shot series recommended in those aged 48 and above     Health Maintenance Due:      Topic Date Due    HIV Screening  02/19/2009    Hepatitis C Screening  Completed     Immunizations Due:      Topic Date Due    Pneumococcal Vaccine: Pediatrics (0 to 5 Years) and At-Risk Patients (6 to 59 Years) (1 of 1 - PPSV23) 02/19/2000     Advance Directives   What are advance directives? Advance directives are legal documents that state your wishes and plans for medical care  These plans are made ahead of time in case you lose your ability to make decisions for yourself  Advance directives can apply to any medical decision, such as the treatments you want, and if you want to donate organs  What are the types of advance directives? There are many types of advance directives, and each state has rules about how to use them  You may choose a combination of any of the following:  · Living will: This is a written record of the treatment you want  You can also choose which treatments you do not want, which to limit, and which to stop at a certain time  This includes surgery, medicine, IV fluid, and tube feedings     · Durable power of  for healthcare [facility]): This is a written record that states who you want to make healthcare choices for you when you are unable to make them for yourself  This person, called a proxy, is usually a family member or a friend  You may choose more than 1 proxy  · Do not resuscitate (DNR) order:  A DNR order is used in case your heart stops beating or you stop breathing  It is a request not to have certain forms of treatment, such as CPR  A DNR order may be included in other types of advance directives  · Medical directive: This covers the care that you want if you are in a coma, near death, or unable to make decisions for yourself  You can list the treatments you want for each condition  Treatment may include pain medicine, surgery, blood transfusions, dialysis, IV or tube feedings, and a ventilator (breathing machine)  · Values history: This document has questions about your views, beliefs, and how you feel and think about life  This information can help others choose the care that you would choose  Why are advance directives important? An advance directive helps you control your care  Although spoken wishes may be used, it is better to have your wishes written down  Spoken wishes can be misunderstood, or not followed  Treatments may be given even if you do not want them  An advance directive may make it easier for your family to make difficult choices about your care  Cigarette Smoking and Your Health   Risks to your health if you smoke:  Nicotine and other chemicals found in tobacco damage every cell in your body  Even if you are a light smoker, you have an increased risk for cancer, heart disease, and lung disease  If you are pregnant or have diabetes, smoking increases your risk for complications  Benefits to your health if you stop smoking:   · You decrease respiratory symptoms such as coughing, wheezing, and shortness of breath     · You reduce your risk for cancers of the lung, mouth, throat, kidney, bladder, pancreas, stomach, and cervix  If you already have cancer, you increase the benefits of chemotherapy  You also reduce your risk for cancer returning or a second cancer from developing  · You reduce your risk for heart disease, blood clots, heart attack, and stroke  · You reduce your risk for lung infections, and diseases such as pneumonia, asthma, chronic bronchitis, and emphysema  · Your circulation improves  More oxygen can be delivered to your body  If you have diabetes, you lower your risk for complications, such as kidney, artery, and eye diseases  You also lower your risk for nerve damage  Nerve damage can lead to amputations, poor vision, and blindness  · You improve your body's ability to heal and to fight infections  For more information and support to stop smoking:   · Smokefree  gov  Phone: 8- 383 - 001-2006  Web Address: www smokefrMobileSpaces    [address] 2018 Information is for End User's use only and may not be sold, redistributed or otherwise used for commercial purposes   All illustrations and images included in CareNotes® are the copyrighted property of [employer] M [employer]  or [address] [place] [address]

## 2021-03-12 ENCOUNTER — TELEPHONE (OUTPATIENT)
Dept: DERMATOLOGY | Facility: CLINIC | Age: 26
End: 2021-03-12

## 2021-06-07 ENCOUNTER — OFFICE VISIT (OUTPATIENT)
Dept: DERMATOLOGY | Facility: CLINIC | Age: 26
End: 2021-06-07
Payer: MEDICARE

## 2021-06-07 VITALS — BODY MASS INDEX: 23.1 KG/M2 | HEIGHT: 74 IN | TEMPERATURE: 98.6 F | WEIGHT: 180 LBS

## 2021-06-07 DIAGNOSIS — L85.3 XEROSIS OF SKIN: ICD-10-CM

## 2021-06-07 DIAGNOSIS — L85.8 KERATOSIS PILARIS: Primary | ICD-10-CM

## 2021-06-07 DIAGNOSIS — D22.9 MULTIPLE NEVI: ICD-10-CM

## 2021-06-07 PROCEDURE — 99203 OFFICE O/P NEW LOW 30 MIN: CPT | Performed by: DERMATOLOGY

## 2021-06-07 NOTE — PATIENT INSTRUCTIONS
1  KERATOSIS PILARIS  Assessment and Plan:  Based on a thorough discussion of this condition and the management approach to it (including a comprehensive discussion of the known risks, side effects and potential benefits of treatment), the patient (family) agrees to implement the following specific plan:   Can try amlactin, cerave SA, [place] rough and bumpy    Keratosis pilaris is a very common condition that appears as tiny bumps on the skin that may look like goosebumps or small pimples  These bumps are composed of small plugs of dead skin cells and are most commonly found over the upper arms and thighs  Children may also find bumps on their cheeks  Keratosis pilaris is harmless and affects up to half of normal children and up to three quarters of children who have ichthyosis vulgaris (a dry skin condition due to filaggrin gene mutations)  It is also more common in children with atopic eczema  Common symptoms of keratosis pilaris begin before age 3 or during the teenage years   Bumps over the outer aspect of upper arms and thighs symmetrically that feel like sandpaper   Potentially over buttocks, sides of cheeks, forearms, and upper back   Scaly, skin colored or red spots in keratosis pilaris rubra   Non-painful, but potential to be itchy     Keratosis pilaris is caused by abnormal growth of skin cells lining the upper portion of the hair follicles  Instead of naturally sloughing off, scaly skin will pile up and fill the follicle  There is a strong association with genetics, meaning that the condition has a high chance of being inherited if one or both parents are affected  It can also occur as a side effect of cancer therapies such as vemurafenib  Treating dry skin often helps as dry skin can cause the bumps to be more noticeable  Many people notice that the bumps disappear in the summer months when there is more moisture in the air   Sometimes, keratosis pilaris fades as one grows older, but puberty and hormonal therapy can cause flare-ups  If itch, dryness, or appearance bother you, treatment options include:   Using non-soap cleansers to minimize dryness of the skin    Exfoliation in the shower using a pumice stone or exfoliating sponge   Ammonium lactate cream or lotion (12%)    A moisturizing cream or ointment applied at least 2-3x a day and after bathing   o Creams containing urea or lactic acid are especially helpful    Other medicines that remove dead skin cells can also be effective   o Alpha hydroxyl acid  o Glycolic acid  o Retinoids (adapalene, retinol, tazarotene, trentinoin)  o Salicylic acid   Pulse dye laser treatments or intense pulsed light can reduce redness temporarily, but not roughness    Laser assisted hair removal     2  MELANOCYTIC NEVI ("Moles")  Assessment and Plan:  Based on a thorough discussion of this condition and the management approach to it (including a comprehensive discussion of the known risks, side effects and potential benefits of treatment), the patient (family) agrees to implement the following specific plan:   When outside we recommend using a wide brim hat, sunglasses, long sleeve and pants, sunscreen with SPF 74+ with reapplication every 2 hours, or SPF specific clothing    Benign, reassured   Annual skin check     Melanocytic Nevi  Melanocytic nevi ("moles") are tan or brown, raised or flat areas of the skin which have an increased number of melanocytes  Melanocytes are the cells in our body which make pigment and account for skin color  Some moles are present at birth (I e , "congenital nevi"), while others come up later in life (i e , "acquired nevi")  The sun can stimulate the body to make more moles  Sunburns are not the only thing that triggers more moles  Chronic sun exposure can do it too  Clinically distinguishing a healthy mole from melanoma may be difficult, even for experienced dermatologists   The "ABCDE's" of moles have been suggested as a means of helping to alert a person to a suspicious mole and the possible increased risk of melanoma  The suggestions for raising alert are as follows:    Asymmetry: Healthy moles tend to be symmetric, while melanomas are often asymmetric  Asymmetry means if you draw a line through the mole, the two halves do not match in color, size, shape, or surface texture  Asymmetry can be a result of rapid enlargement of a mole, the development of a raised area on a previously flat lesion, scaling, ulceration, bleeding or scabbing within the mole  Any mole that starts to demonstrate "asymmetry" should be examined promptly by a board certified dermatologist      Border: Healthy moles tend to have discrete, even borders  The border of a melanoma often blends into the normal skin and does not sharply delineate the mole from normal skin  Any mole that starts to demonstrate "uneven borders" should be examined promptly by a board certified dermatologist      Color: Healthy moles tend to be one color throughout  Melanomas tend to be made up of different colors ranging from dark black, blue, white, or red  Any mole that demonstrates a color change should be examined promptly by a board certified dermatologist      Diameter: Healthy moles tend to be smaller than 0 6 cm in size; an exception are "congenital nevi" that can be larger  Melanomas tend to grow and can often be greater than 0 6 cm (1/4 of an inch, or the size of a pencil eraser)  This is only a guideline, and many normal moles may be larger than 0 6 cm without being unhealthy  Any mole that starts to change in size (small to bigger or bigger to smaller) should be examined promptly by a board certified dermatologist      Evolving: Healthy moles tend to "stay the same "  Melanomas may often show signs of change or evolution such as a change in size, shape, color, or elevation    Any mole that starts to itch, bleed, crust, burn, hurt, or ulcerate or demonstrate a change or evolution should be examined promptly by a board certified dermatologist     3  XEROSIS ("DRY SKIN")  Assessment and Plan:  Based on a thorough discussion of this condition and the management approach to it (including a comprehensive discussion of the known risks, side effects and potential benefits of treatment), the patient (family) agrees to implement the following specific plan:   Use moisturizer like Eucerin,Cerave or Aveeno Cream 3 times a day for the dry skin           Can also try using Vaseline     Dry skin refers to skin that feels dry to touch  Dry skin has a dull surface with a rough, scaly quality  The skin is less pliable and cracked  When dryness is severe, the skin may become inflamed and fissured  Although any body site can be dry, dry skin tends to affect the shins more than any other site  Dry skin is lacking moisture in the outer horny cell layer (stratum corneum) and this results in cracks in the skin surface  Dry skin is also called xerosis, xeroderma or asteatosis (lack of fat)  It can affect males and females of all ages  There is some racial variability in water and lipid content of the skin   Dry skin that starts in early childhood may be one of about 20 types of ichthyosis (fish-scale skin)  There is often a family history of dry skin   Dry skin is commonly seen in people with atopic dermatitis   Nearly everyone > 60 years has dry skin  Dry skin that begins later may be seen in people with certain diseases and conditions   Postmenopausal women   Hypothyroidism   Chronic renal disease    Malnutrition and weight loss    Subclinical dermatitis    Treatment with certain drugs such as oral retinoids, diuretics and epidermal growth factor receptor inhibitors      What is the treatment for dry skin? The mainstay of treatment of dry skin and ichthyosis is moisturisers/emollients   They should be applied liberally and often enough to:   Reduce itch    Improve the barrier function    Prevent entry of irritants, bacteria    Reduce transepidermal water loss  How can dry skin be prevented? Eliminate aggravating factors:   Reduce the frequency of bathing   A humidifier in winter and air conditioner in summer    Compare having a short shower with a prolonged soak in a bath   Use lukewarm, not hot, water   Replace standard soap with a substitute such as a synthetic detergent cleanser, water-miscible emollient, bath oil, anti-pruritic tar oil, colloidal oatmeal etc     Apply an emollient liberally and often, particularly shortly after bathing, and when itchy  The drier the skin, the thicker this should be, especially on the hands  What is the outlook for dry skin? A tendency to dry skin may persist life-long, or it may improve once contributing factors are controlled

## 2021-06-07 NOTE — PROGRESS NOTES
[facility]ology [facility] Note     Patient Name: Herminia Olson  Encounter Date: 6/7/2021     Have you been cared for by a [facility] Dermatologist in the last 3 years and, if so, which one? No    · Have you traveled outside of the [address] [place] [address] in the past 3 months or outside of the [facility] area in the last 2 weeks? No     May we call your Preferred Phone number to discuss your specific medical information? Yes     May we leave a detailed message that includes your specific medical information? Yes      Today's Chief Concerns:   Concern #1:  Rash     Past Medical History:  Have you personally ever had or currently have any of the following? · Skin cancer (such as Melanoma, Basal Cell Carcinoma, Squamous Cell Carcinoma? (If Yes, please provide more detail)- No  · Eczema: No  · Psoriasis: No  · HIV/AIDS: No  · Hepatitis B or C: No  · Tuberculosis: No  · Systemic Immunosuppression such as Diabetes, Biologic or Immunotherapy, Chemotherapy, Organ Transplantation, Bone Marrow Transplantation (If YES, please provide more detail): No  · Radiation Treatment (If YES, please provide more detail): No  · Any other major medical conditions/concerns? (If Yes, which types)- No    Social History:     What is/was your primary occupation?        What are your hobbies/past-times? Hiking, camping, gym    Family History:  Have any of your "first degree relatives" (parent, brother, sister, or child) had any of the following       · Skin cancer such as Melanoma or Merkel Cell Carcinoma or Pancreatic Cancer? No  · Eczema, Asthma, Hay Fever or Seasonal Allergies: No  · Psoriasis or Psoriatic Arthritis: No  · Do any other medical conditions seem to run in your family? If Yes, what condition and which relatives?   No    Current Medications:       Current Outpatient Medications:     APPLE CIDER VINEGAR PO, Take by mouth, Disp: , Rfl:     bacitracin topical ointment 500 units/g topical ointment, Apply 1 large application topically 2 (two) times a day, Disp: 28 g, Rfl: 0    benzoyl peroxide 5 % external liquid, Apply topically 2 (two) times a day, Disp: 1 Bottle, Rfl: 1    clindamycin (CLEOCIN T) 1 % lotion, Apply topically 2 (two) times a day, Disp: 60 mL, Rfl: 0    ELDERBERRY PO, Take by mouth, Disp: , Rfl:     hydrocortisone 2 5 % ointment, Apply topically 2 (two) times a day, Disp: 30 g, Rfl: 0      Review of Systems:  Have you recently had or currently have any of the following? If YES, what are you doing for the problem? · Fever, chills or unintended weight loss: No  · Sudden loss or change in your vision: No  · Nausea, vomiting or blood in your stool: No  · Painful or swollen joints: No  · Wheezing or cough: No  · Changing mole or non-healing wound: No  · Nosebleeds: No  · Excessive sweating: No  · Easy or prolonged bleeding? No  · Over the last 2 weeks, how often have you been bothered by the following problems? · Taking little interest or pleasure in doing things: 1 - Not at All  · Feeling down, depressed, or hopeless: 1 - Not at All  · Rapid heartbeat with epinephrine:  No    · FEMALES ONLY:    · Are you pregnant or planning to become pregnant? N/A  · Are you currently or planning to be nursing or breast feeding? N/A    · Any known allergies? Allergies   Allergen Reactions    Other      Seasonal- sneezing, congestion   ·       Physical Exam:     Was a chaperone (Derm Clinical Assistant) present throughout the entire Physical Exam? Yes     Did the Dermatology Team specifically  the patient on the importance of a Full Skin Exam to be sure that nothing is missed clinically?  Yes}  o Did the patient ultimately request or accept a Full Skin Exam?  Yes  o Did the patient specifically refuse to have the areas "under-the-bra" examined by the Dermatologist? No  o Did the patient specifically refuse to have the areas "under-the-underwear" examined by the Dermatologist? No    CONSTITUTIONAL:   Vitals:    06/07/21 1031   Temp: 98 6 °F (37 °C)   TempSrc: Tympanic   Weight: 81 6 kg (180 lb)   Height: 6' 2" (1 88 m)       PSYCH: Normal mood and affect  EYES: Normal conjunctiva  ENT: Normal lips and oral mucosa  CARDIOVASCULAR: No edema  RESPIRATORY: Normal respirations  HEME/LYMPH/IMMUNO:  No regional lymphadenopathy except as noted below in "ASSESSMENT AND PLAN BY DIAGNOSIS"    SKIN:  FULL ORGAN SYSTEM EXAM   Hair, Scalp, Ears, Face Normal except as noted below in Assessment   Neck, Cervical Chain Nodes Normal except as noted below in Assessment   Right Arm/Hand/Fingers Normal except as noted below in Assessment   Left Arm/Hand/Fingers Normal except as noted below in Assessment   Chest/Breasts/Axillae Viewed areas Normal except as noted below in Assessment   Abdomen, Umbilicus Normal except as noted below in Assessment   Back/Spine Normal except as noted below in Assessment   Groin/Genitalia/Buttocks Normal except as noted below in Assessment   Right Leg, Foot, Toes Normal except as noted below in Assessment   Left Leg, Foot, Toes Normal except as noted below in Assessment        Assessment and Plan by Diagnosis:    History of Present Condition:     Duration:  How long has this been an issue for you?    o  1 year   Location Affected:  Where on the body is this affecting you?    o  right arm   Quality:  Is there any bleeding, pain, itch, burning/irritation, or redness associated with the skin lesion? o  itchy   Severity:  Describe any bleeding, pain, itch, burning/irritation, or redness on a scale of 1 to 10 (with 10 being the worst)  o  5   Timing:  Does this condition seem to be there pretty constantly or do you notice it more at specific times throughout the day? o  constant   Context:  Have you ever noticed that this condition seems to be associated with specific activities you do?    o  denies   Modifying Factors:    o Anything that seems to make the condition worse? -  denies  o What have you tried to do to make the condition better?    -  hydrocortisone 2 5% ointment   Associated Signs and Symptoms:  Does this skin lesion seem to be associated with any of the following:  o  SL AMB DERM SIGNS AND SYMPTOMS: Redness and Itching and Scratching     1  KERATOSIS PILARIS    Physical Exam:   Anatomic Location Affected:  Arms    Morphological Description:  1-3SS ashok-follicular pinkish-red papules    Pertinent Positives:   Pertinent Negatives: Additional History of Present Condition:  See derm note above  Assessment and Plan:  Based on a thorough discussion of this condition and the management approach to it (including a comprehensive discussion of the known risks, side effects and potential benefits of treatment), the patient (family) agrees to implement the following specific plan:   Can try amlactin, cerave SA, Gold Bond rough and bumpy    Keratosis pilaris is a very common condition that appears as tiny bumps on the skin that may look like goosebumps or small pimples  These bumps are composed of small plugs of dead skin cells and are most commonly found over the upper arms and thighs  Children may also find bumps on their cheeks  Keratosis pilaris is harmless and affects up to half of normal children and up to three quarters of children who have ichthyosis vulgaris (a dry skin condition due to filaggrin gene mutations)  It is also more common in children with atopic eczema  Common symptoms of keratosis pilaris begin before age 3 or during the teenage years   Bumps over the outer aspect of upper arms and thighs symmetrically that feel like sandpaper   Potentially over buttocks, sides of cheeks, forearms, and upper back   Scaly, skin colored or red spots in keratosis pilaris rubra   Non-painful, but potential to be itchy     Keratosis pilaris is caused by abnormal growth of skin cells lining the upper portion of the hair follicles   Instead of naturally sloughing off, scaly skin will pile up and fill the follicle  There is a strong association with genetics, meaning that the condition has a high chance of being inherited if one or both parents are affected  It can also occur as a side effect of cancer therapies such as vemurafenib  Treating dry skin often helps as dry skin can cause the bumps to be more noticeable  Many people notice that the bumps disappear in the summer months when there is more moisture in the air  Sometimes, keratosis pilaris fades as one grows older, but puberty and hormonal therapy can cause flare-ups  If itch, dryness, or appearance bother you, treatment options include:   Using non-soap cleansers to minimize dryness of the skin    Exfoliation in the shower using a pumice stone or exfoliating sponge   Ammonium lactate cream or lotion (12%)    A moisturizing cream or ointment applied at least 2-3x a day and after bathing   o Creams containing urea or lactic acid are especially helpful    Other medicines that remove dead skin cells can also be effective   o Alpha hydroxyl acid  o Glycolic acid  o Retinoids (adapalene, retinol, tazarotene, trentinoin)  o Salicylic acid   Pulse dye laser treatments or intense pulsed light can reduce redness temporarily, but not roughness    Laser assisted hair removal     2   MELANOCYTIC NEVI ("Moles")    Physical Exam:   Anatomic Location Affected:   Mostly on sun-exposed areas of the trunk and extremities   Morphological Description:  Scattered, 1-4mm round to ovoid, symmetrical-appearing, even bordered, skin colored to dark brown macules/papules, mostly in sun-exposed areas   Pertinent Positives:   Pertinent Negatives:    Assessment and Plan:  Based on a thorough discussion of this condition and the management approach to it (including a comprehensive discussion of the known risks, side effects and potential benefits of treatment), the patient (family) agrees to implement the following specific plan:   When outside we recommend using a wide brim hat, sunglasses, long sleeve and pants, sunscreen with SPF 36+ with reapplication every 2 hours, or SPF specific clothing    Benign, reassured   Annual skin check     Melanocytic Nevi  Melanocytic nevi ("moles") are tan or brown, raised or flat areas of the skin which have an increased number of melanocytes  Melanocytes are the cells in our body which make pigment and account for skin color  Some moles are present at birth (I e , "congenital nevi"), while others come up later in life (i e , "acquired nevi")  The sun can stimulate the body to make more moles  Sunburns are not the only thing that triggers more moles  Chronic sun exposure can do it too  Clinically distinguishing a healthy mole from melanoma may be difficult, even for experienced dermatologists  The "ABCDE's" of moles have been suggested as a means of helping to alert a person to a suspicious mole and the possible increased risk of melanoma  The suggestions for raising alert are as follows:    Asymmetry: Healthy moles tend to be symmetric, while melanomas are often asymmetric  Asymmetry means if you draw a line through the mole, the two halves do not match in color, size, shape, or surface texture  Asymmetry can be a result of rapid enlargement of a mole, the development of a raised area on a previously flat lesion, scaling, ulceration, bleeding or scabbing within the mole  Any mole that starts to demonstrate "asymmetry" should be examined promptly by a board certified dermatologist      Border: Healthy moles tend to have discrete, even borders  The border of a melanoma often blends into the normal skin and does not sharply delineate the mole from normal skin  Any mole that starts to demonstrate "uneven borders" should be examined promptly by a board certified dermatologist      Color: Healthy moles tend to be one color throughout    Melanomas tend to be made up of different colors ranging from dark black, blue, white, or red  Any mole that demonstrates a color change should be examined promptly by a board certified dermatologist      Diameter: Healthy moles tend to be smaller than 0 6 cm in size; an exception are "congenital nevi" that can be larger  Melanomas tend to grow and can often be greater than 0 6 cm (1/4 of an inch, or the size of a pencil eraser)  This is only a guideline, and many normal moles may be larger than 0 6 cm without being unhealthy  Any mole that starts to change in size (small to bigger or bigger to smaller) should be examined promptly by a board certified dermatologist      Evolving: Healthy moles tend to "stay the same "  Melanomas may often show signs of change or evolution such as a change in size, shape, color, or elevation  Any mole that starts to itch, bleed, crust, burn, hurt, or ulcerate or demonstrate a change or evolution should be examined promptly by a board certified dermatologist       3  XEROSIS ("DRY SKIN")    Physical Exam:   Anatomic Location Affected:  diffuse   Morphological Description:  xerosis   Pertinent Positives:   Pertinent Negatives: Additional History of Present Condition:  Patient currently uses Gold Bond lotion  Assessment and Plan:  Based on a thorough discussion of this condition and the management approach to it (including a comprehensive discussion of the known risks, side effects and potential benefits of treatment), the patient (family) agrees to implement the following specific plan:   Use moisturizer like Eucerin,Cerave or Aveeno Cream 3 times a day for the dry skin           Can also try using Vaseline     Dry skin refers to skin that feels dry to touch  Dry skin has a dull surface with a rough, scaly quality  The skin is less pliable and cracked  When dryness is severe, the skin may become inflamed and fissured    Although any body site can be dry, dry skin tends to affect the shins more than any other site     Dry skin is lacking moisture in the outer horny cell layer (stratum corneum) and this results in cracks in the skin surface  Dry skin is also called xerosis, xeroderma or asteatosis (lack of fat)  It can affect males and females of all ages  There is some racial variability in water and lipid content of the skin   Dry skin that starts in early childhood may be one of about 20 types of ichthyosis (fish-scale skin)  There is often a family history of dry skin   Dry skin is commonly seen in people with atopic dermatitis   Nearly everyone > 60 years has dry skin  Dry skin that begins later may be seen in people with certain diseases and conditions   Postmenopausal women   Hypothyroidism   Chronic renal disease    Malnutrition and weight loss    Subclinical dermatitis    Treatment with certain drugs such as oral retinoids, diuretics and epidermal growth factor receptor inhibitors      What is the treatment for dry skin? The mainstay of treatment of dry skin and ichthyosis is moisturisers/emollients  They should be applied liberally and often enough to:   Reduce itch    Improve the barrier function    Prevent entry of irritants, bacteria    Reduce transepidermal water loss  How can dry skin be prevented? Eliminate aggravating factors:   Reduce the frequency of bathing   A humidifier in winter and air conditioner in summer    Compare having a short shower with a prolonged soak in a bath   Use lukewarm, not hot, water   Replace standard soap with a substitute such as a synthetic detergent cleanser, water-miscible emollient, bath oil, anti-pruritic tar oil, colloidal oatmeal etc     Apply an emollient liberally and often, particularly shortly after bathing, and when itchy  The drier the skin, the thicker this should be, especially on the hands  What is the outlook for dry skin?   A tendency to dry skin may persist life-long, or it may improve once contributing factors are controlled        Scribe Attestation    I,:  Danley Siemens am acting as a scribe while in the presence of the attending physician :       I,:  Barbie Wright MD personally performed the services described in this documentation    as scribed in my presence :

## 2021-08-30 ENCOUNTER — APPOINTMENT (OUTPATIENT)
Dept: LAB | Facility: IMAGING CENTER | Age: 26
End: 2021-08-30
Payer: MEDICARE

## 2021-08-30 DIAGNOSIS — Z13.228 SCREENING FOR METABOLIC DISORDER: ICD-10-CM

## 2021-08-30 LAB
ALBUMIN SERPL BCP-MCNC: 4.5 G/DL (ref 3.5–5)
ALP SERPL-CCNC: 65 U/L (ref 46–116)
ALT SERPL W P-5'-P-CCNC: 35 U/L (ref 12–78)
ANION GAP SERPL CALCULATED.3IONS-SCNC: 5 MMOL/L (ref 4–13)
AST SERPL W P-5'-P-CCNC: 24 U/L (ref 5–45)
BASOPHILS # BLD AUTO: 0.05 THOUSANDS/ΜL (ref 0–0.1)
BASOPHILS NFR BLD AUTO: 1 % (ref 0–1)
BILIRUB SERPL-MCNC: 0.77 MG/DL (ref 0.2–1)
BUN SERPL-MCNC: 6 MG/DL (ref 5–25)
CALCIUM SERPL-MCNC: 9.7 MG/DL (ref 8.3–10.1)
CHLORIDE SERPL-SCNC: 105 MMOL/L (ref 100–108)
CHOLEST SERPL-MCNC: 99 MG/DL (ref 50–200)
CO2 SERPL-SCNC: 29 MMOL/L (ref 21–32)
CREAT SERPL-MCNC: 0.9 MG/DL (ref 0.6–1.3)
EOSINOPHIL # BLD AUTO: 0.35 THOUSAND/ΜL (ref 0–0.61)
EOSINOPHIL NFR BLD AUTO: 5 % (ref 0–6)
ERYTHROCYTE [DISTWIDTH] IN BLOOD BY AUTOMATED COUNT: 12.2 % (ref 11.6–15.1)
GFR SERPL CREATININE-BSD FRML MDRD: 117 ML/MIN/1.73SQ M
GLUCOSE P FAST SERPL-MCNC: 95 MG/DL (ref 65–99)
HCT VFR BLD AUTO: 48.9 % (ref 36.5–49.3)
HDLC SERPL-MCNC: 66 MG/DL
HGB BLD-MCNC: 17.2 G/DL (ref 12–17)
IMM GRANULOCYTES # BLD AUTO: 0.02 THOUSAND/UL (ref 0–0.2)
IMM GRANULOCYTES NFR BLD AUTO: 0 % (ref 0–2)
LDLC SERPL CALC-MCNC: 27 MG/DL (ref 0–100)
LYMPHOCYTES # BLD AUTO: 1.88 THOUSANDS/ΜL (ref 0.6–4.47)
LYMPHOCYTES NFR BLD AUTO: 25 % (ref 14–44)
MCH RBC QN AUTO: 33.5 PG (ref 26.8–34.3)
MCHC RBC AUTO-ENTMCNC: 35.2 G/DL (ref 31.4–37.4)
MCV RBC AUTO: 95 FL (ref 82–98)
MONOCYTES # BLD AUTO: 0.75 THOUSAND/ΜL (ref 0.17–1.22)
MONOCYTES NFR BLD AUTO: 10 % (ref 4–12)
NEUTROPHILS # BLD AUTO: 4.6 THOUSANDS/ΜL (ref 1.85–7.62)
NEUTS SEG NFR BLD AUTO: 59 % (ref 43–75)
NONHDLC SERPL-MCNC: 33 MG/DL
NRBC BLD AUTO-RTO: 0 /100 WBCS
PLATELET # BLD AUTO: 206 THOUSANDS/UL (ref 149–390)
PMV BLD AUTO: 11.4 FL (ref 8.9–12.7)
POTASSIUM SERPL-SCNC: 3.8 MMOL/L (ref 3.5–5.3)
PROT SERPL-MCNC: 7.8 G/DL (ref 6.4–8.2)
RBC # BLD AUTO: 5.13 MILLION/UL (ref 3.88–5.62)
SODIUM SERPL-SCNC: 139 MMOL/L (ref 136–145)
TRIGL SERPL-MCNC: 32 MG/DL
WBC # BLD AUTO: 7.65 THOUSAND/UL (ref 4.31–10.16)

## 2021-08-30 PROCEDURE — 85025 COMPLETE CBC W/AUTO DIFF WBC: CPT | Performed by: NURSE PRACTITIONER

## 2021-08-30 PROCEDURE — 36415 COLL VENOUS BLD VENIPUNCTURE: CPT | Performed by: NURSE PRACTITIONER

## 2021-08-30 PROCEDURE — 80061 LIPID PANEL: CPT | Performed by: NURSE PRACTITIONER

## 2021-08-30 PROCEDURE — 80053 COMPREHEN METABOLIC PANEL: CPT

## 2021-09-29 ENCOUNTER — OFFICE VISIT (OUTPATIENT)
Dept: INTERNAL MEDICINE CLINIC | Facility: CLINIC | Age: 26
End: 2021-09-29
Payer: MEDICARE

## 2021-09-29 VITALS
DIASTOLIC BLOOD PRESSURE: 82 MMHG | BODY MASS INDEX: 21 KG/M2 | TEMPERATURE: 97.5 F | OXYGEN SATURATION: 98 % | HEART RATE: 88 BPM | HEIGHT: 74 IN | WEIGHT: 163.6 LBS | SYSTOLIC BLOOD PRESSURE: 124 MMHG

## 2021-09-29 DIAGNOSIS — F20.9 SCHIZOPHRENIA, UNSPECIFIED TYPE (HCC): ICD-10-CM

## 2021-09-29 DIAGNOSIS — I10 ESSENTIAL HYPERTENSION: ICD-10-CM

## 2021-09-29 DIAGNOSIS — F10.10 ALCOHOL ABUSE: ICD-10-CM

## 2021-09-29 DIAGNOSIS — L85.3 DRY SKIN: Primary | ICD-10-CM

## 2021-09-29 DIAGNOSIS — K21.9 GASTROESOPHAGEAL REFLUX DISEASE WITHOUT ESOPHAGITIS: ICD-10-CM

## 2021-09-29 PROCEDURE — 99213 OFFICE O/P EST LOW 20 MIN: CPT | Performed by: NURSE PRACTITIONER

## 2021-09-29 RX ORDER — PETROLATUM 0.61 G/G
CREAM TOPICAL AS NEEDED
Qty: 397 G | Refills: 0 | Status: SHIPPED | OUTPATIENT
Start: 2021-09-29

## 2021-09-29 RX ORDER — IBUPROFEN 200 MG
TABLET ORAL 4 TIMES DAILY
Qty: 28.3 G | Refills: 0 | Status: SHIPPED | OUTPATIENT
Start: 2021-09-29

## 2021-09-29 NOTE — ASSESSMENT & PLAN NOTE
Patient is to use mild soap when handwashing    Will prescribe hydrocortisone cream, apply triple antibiotic ointment to open areas, and use Eucerin cream

## 2021-09-29 NOTE — ASSESSMENT & PLAN NOTE
Blood pressure stable currently off of medication  Continue to monitor blood pressure at home  Goal BP is < 130/80  Contact our office for consistent elevations  Recommend low sodium diet  Exercise 30 minutes 5 times a week as tolerated    Recommend yearly eye exam

## 2021-09-29 NOTE — PROGRESS NOTES
Assessment/Plan:    Gastroesophageal reflux disease without esophagitis   You may use OTC tums as needed  Recommend small frequent meals throughout the day  Avoid aggravating foods - spicy foods, acidic foods - such as tomato and citrus  Avoid alcohol  Do not lay down for at least 30 mins after eating  Hypertension  Blood pressure stable currently off of medication  Continue to monitor blood pressure at home  Goal BP is < 130/80  Contact our office for consistent elevations  Recommend low sodium diet  Exercise 30 minutes 5 times a week as tolerated  Recommend yearly eye exam       Schizophrenia [facility])    Patient has been noncompliant with his Zoloft, he does not take his medication, patient is currently being followed closely by Psychology, patient was given referral to psychologist   Patient currently has psychologist but would like a 2nd opinion  Patient refuses help  Alcohol abuse   Patient has been frequently binge drinking and has been seen in the ER due to episode after drinking  Discussed reduction of alcohol intake  Also discussed possible places he can receive help patient refuses at this time  Dry skin  Patient is to use mild soap when handwashing  Will prescribe hydrocortisone cream, apply triple antibiotic ointment to open areas, and use Eucerin cream               Diagnoses and all orders for this visit:    Dry skin  -     Skin Protectants, Misc  (eucerin) cream; Apply topically as needed for wound care  -     hydrocortisone 2 5 % ointment; Apply topically 2 (two) times a day  -     neomycin-bacitracin-polymyxin (NEOSPORIN) 5-400-5,000 ointment; Apply topically 4 (four) times a day    Gastroesophageal reflux disease without esophagitis    Essential hypertension    Schizophrenia, unspecified type ([facility] )    Alcohol abuse          Subjective:      Patient ID: Nicole Brito is a 31 y o  male  Patient presents today to follow-up on hypertension and schizophrenia   Patient has no other new complaints  Hypertension  This is a chronic problem  The current episode started more than 1 year ago  The problem has been waxing and waning since onset  The problem is controlled  Pertinent negatives include no chest pain, headaches, neck pain, palpitations or shortness of breath  Risk factors for coronary artery disease include male gender  Compliance problems include exercise and diet  The following portions of the patient's history were reviewed and updated as appropriate: allergies, current medications, past family history, past medical history, past social history, past surgical history and problem list     Review of Systems   Constitutional: Negative for activity change, appetite change, chills, diaphoresis and fever  HENT: Negative for congestion, ear discharge, ear pain, postnasal drip, rhinorrhea, sinus pressure, sinus pain and sore throat  Eyes: Negative for pain, discharge, itching and visual disturbance  Respiratory: Negative for cough, chest tightness, shortness of breath and wheezing  Cardiovascular: Negative for chest pain, palpitations and leg swelling  Gastrointestinal: Negative for abdominal pain, constipation, diarrhea, nausea and vomiting  Endocrine: Negative for polydipsia, polyphagia and polyuria  Genitourinary: Negative for difficulty urinating, dysuria and urgency  Musculoskeletal: Negative for arthralgias, back pain and neck pain  Skin: Negative for rash and wound  Neurological: Negative for dizziness, weakness, numbness and headaches           Past Medical History:   Diagnosis Date    Anxiety     Last assessed 10/18/16    Asthma     Last assessed 03/15/13    Closed fracture of nasal bone 4/24/2018    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    Intermittent explosive disorder     Oppositional defiant disorder     Palpitation 2/13/2019    Schizophrenia ([facility] )     last assessed 12/04/17    Scoliosis          Current Outpatient Medications:    4 - 12 %    Eosinophils Relative 5 0 - 6 %    Basophils Relative 1 0 - 1 %    Neutrophils Absolute 4 60 1 85 - 7 62 Thousands/µL    Immature Grans Absolute 0 02 0 00 - 0 20 Thousand/uL    Lymphocytes Absolute 1 88 0 60 - 4 47 Thousands/µL    Monocytes Absolute 0 75 0 17 - 1 22 Thousand/µL    Eosinophils Absolute 0 35 0 00 - 0 61 Thousand/µL    Basophils Absolute 0 05 0 00 - 0 10 Thousands/µL   Lipid panel    Collection Time: 08/30/21 12:32 PM   Result Value Ref Range    Cholesterol 99 50 - 200 mg/dL    Triglycerides 32 <=150 mg/dL    HDL, Direct 66 >=40 mg/dL    LDL Calculated 27 0 - 100 mg/dL    Non-HDL-Chol (CHOL-HDL) 33 mg/dl   Comprehensive metabolic panel    Collection Time: 08/30/21 12:32 PM   Result Value Ref Range    Sodium 139 136 - 145 mmol/L    Potassium 3 8 3 5 - 5 3 mmol/L    Chloride 105 100 - 108 mmol/L    CO2 29 21 - 32 mmol/L    ANION GAP 5 4 - 13 mmol/L    BUN 6 5 - 25 mg/dL    Creatinine 0 90 0 60 - 1 30 mg/dL    Glucose, Fasting 95 65 - 99 mg/dL    Calcium 9 7 8 3 - 10 1 mg/dL    AST 24 5 - 45 U/L    ALT 35 12 - 78 U/L    Alkaline Phosphatase 65 46 - 116 U/L    Total Protein 7 8 6 4 - 8 2 g/dL    Albumin 4 5 3 5 - 5 0 g/dL    Total Bilirubin 0 77 0 20 - 1 00 mg/dL    eGFR 117 ml/min/1 73sq m            Physical Exam  Constitutional:       General: He is not in acute distress  Appearance: He is well-developed  He is not diaphoretic  HENT:      Head: Normocephalic and atraumatic  Right Ear: External ear normal       Left Ear: External ear normal       Nose: Nose normal       Mouth/Throat:      Pharynx: No oropharyngeal exudate  Eyes:      General:         Right eye: No discharge  Left eye: No discharge  Conjunctiva/sclera: Conjunctivae normal       Pupils: Pupils are equal, round, and reactive to light  Neck:      Thyroid: No thyromegaly  Cardiovascular:      Rate and Rhythm: Normal rate and regular rhythm  Heart sounds: Normal heart sounds  No murmur heard     No friction rub  No gallop  Pulmonary:      Effort: Pulmonary effort is normal  No respiratory distress  Breath sounds: Normal breath sounds  No stridor  No wheezing or rales  Abdominal:      General: Bowel sounds are normal  There is no distension  Palpations: Abdomen is soft  Tenderness: There is no abdominal tenderness  Musculoskeletal:      Cervical back: Normal range of motion and neck supple  Lymphadenopathy:      Cervical: No cervical adenopathy  Skin:     General: Skin is warm and dry  Findings: No erythema or rash  Comments: Dry cracked hands   Neurological:      Mental Status: He is alert and oriented to person, place, and time  Psychiatric:         Behavior: Behavior normal          Thought Content:  Thought content normal          Judgment: Judgment normal

## 2021-09-29 NOTE — ASSESSMENT & PLAN NOTE
Patient has been frequently binge drinking and has been seen in the ER due to episode after drinking  Discussed reduction of alcohol intake  Also discussed possible places he can receive help patient refuses at this time

## 2021-11-03 ENCOUNTER — TELEPHONE (OUTPATIENT)
Dept: INTERNAL MEDICINE CLINIC | Facility: CLINIC | Age: 26
End: 2021-11-03

## 2022-02-16 ENCOUNTER — TELEPHONE (OUTPATIENT)
Dept: INTERNAL MEDICINE CLINIC | Facility: OTHER | Age: 27
End: 2022-02-16

## 2022-02-16 NOTE — TELEPHONE ENCOUNTER
Received paperwork in my folder, not sure who placed in there however in order to fill out these forms I need to discuss with patient I had previously called and was unable to reach patient  Can we please set up a virtual to discussed paperwork

## 2022-03-02 ENCOUNTER — APPOINTMENT (EMERGENCY)
Dept: CT IMAGING | Facility: HOSPITAL | Age: 27
End: 2022-03-02
Payer: MEDICARE

## 2022-03-02 ENCOUNTER — HOSPITAL ENCOUNTER (EMERGENCY)
Facility: HOSPITAL | Age: 27
Discharge: HOME | End: 2022-03-02
Attending: EMERGENCY MEDICINE | Admitting: EMERGENCY MEDICINE
Payer: MEDICARE

## 2022-03-02 VITALS
TEMPERATURE: 98.6 F | WEIGHT: 165 LBS | OXYGEN SATURATION: 98 % | DIASTOLIC BLOOD PRESSURE: 85 MMHG | BODY MASS INDEX: 21.17 KG/M2 | SYSTOLIC BLOOD PRESSURE: 139 MMHG | HEART RATE: 76 BPM | RESPIRATION RATE: 16 BRPM | HEIGHT: 74 IN

## 2022-03-02 DIAGNOSIS — Z59.00 HOMELESS: ICD-10-CM

## 2022-03-02 DIAGNOSIS — I49.8 ACCELERATED JUNCTIONAL RHYTHM: ICD-10-CM

## 2022-03-02 DIAGNOSIS — R55 SYNCOPE AND COLLAPSE: Primary | ICD-10-CM

## 2022-03-02 DIAGNOSIS — F10.10 ALCOHOL ABUSE: ICD-10-CM

## 2022-03-02 LAB
2HR DELTA HS TROPONIN: >5 NG/L
ALBUMIN SERPL BCP-MCNC: 4.1 G/DL (ref 3.5–5)
ALP SERPL-CCNC: 60 U/L (ref 46–116)
ALT SERPL W P-5'-P-CCNC: 38 U/L (ref 12–78)
ANION GAP SERPL CALCULATED.3IONS-SCNC: 11 MMOL/L (ref 4–13)
AST SERPL W P-5'-P-CCNC: 21 U/L (ref 5–45)
ATRIAL RATE: 112 BPM
ATRIAL RATE: 79 BPM
ATRIAL RATE: 82 BPM
BASOPHILS # BLD AUTO: 0.04 THOUSANDS/ΜL (ref 0–0.1)
BASOPHILS NFR BLD AUTO: 1 % (ref 0–1)
BILIRUB SERPL-MCNC: 1.36 MG/DL (ref 0.2–1)
BUN SERPL-MCNC: 8 MG/DL (ref 5–25)
CALCIUM SERPL-MCNC: 9 MG/DL (ref 8.3–10.1)
CARDIAC TROPONIN I PNL SERPL HS: 7 NG/L
CARDIAC TROPONIN I PNL SERPL HS: <2 NG/L
CHLORIDE SERPL-SCNC: 100 MMOL/L (ref 100–108)
CO2 SERPL-SCNC: 27 MMOL/L (ref 21–32)
CREAT SERPL-MCNC: 0.79 MG/DL (ref 0.6–1.3)
EOSINOPHIL # BLD AUTO: 0.38 THOUSAND/ΜL (ref 0–0.61)
EOSINOPHIL NFR BLD AUTO: 5 % (ref 0–6)
ERYTHROCYTE [DISTWIDTH] IN BLOOD BY AUTOMATED COUNT: 12.6 % (ref 11.6–15.1)
GFR SERPL CREATININE-BSD FRML MDRD: 122 ML/MIN/1.73SQ M
GLUCOSE SERPL-MCNC: 88 MG/DL (ref 65–140)
HCT VFR BLD AUTO: 46.2 % (ref 36.5–49.3)
HGB BLD-MCNC: 16.6 G/DL (ref 12–17)
IMM GRANULOCYTES # BLD AUTO: 0.03 THOUSAND/UL (ref 0–0.2)
IMM GRANULOCYTES NFR BLD AUTO: 0 % (ref 0–2)
LYMPHOCYTES # BLD AUTO: 1.1 THOUSANDS/ΜL (ref 0.6–4.47)
LYMPHOCYTES NFR BLD AUTO: 15 % (ref 14–44)
MCH RBC QN AUTO: 34 PG (ref 26.8–34.3)
MCHC RBC AUTO-ENTMCNC: 35.9 G/DL (ref 31.4–37.4)
MCV RBC AUTO: 95 FL (ref 82–98)
MONOCYTES # BLD AUTO: 0.81 THOUSAND/ΜL (ref 0.17–1.22)
MONOCYTES NFR BLD AUTO: 11 % (ref 4–12)
NEUTROPHILS # BLD AUTO: 5.25 THOUSANDS/ΜL (ref 1.85–7.62)
NEUTS SEG NFR BLD AUTO: 68 % (ref 43–75)
NRBC BLD AUTO-RTO: 0 /100 WBCS
P AXIS: 74 DEGREES
P AXIS: 80 DEGREES
P AXIS: 83 DEGREES
PLATELET # BLD AUTO: 150 THOUSANDS/UL (ref 149–390)
PMV BLD AUTO: 9.6 FL (ref 8.9–12.7)
POTASSIUM SERPL-SCNC: 3.5 MMOL/L (ref 3.5–5.3)
PR INTERVAL: 150 MS
PR INTERVAL: 154 MS
PROT SERPL-MCNC: 7 G/DL (ref 6.4–8.2)
QRS AXIS: 85 DEGREES
QRS AXIS: 85 DEGREES
QRS AXIS: 86 DEGREES
QRSD INTERVAL: 122 MS
QRSD INTERVAL: 82 MS
QRSD INTERVAL: 86 MS
QT INTERVAL: 336 MS
QT INTERVAL: 364 MS
QT INTERVAL: 384 MS
QTC INTERVAL: 425 MS
QTC INTERVAL: 440 MS
QTC INTERVAL: 440 MS
RBC # BLD AUTO: 4.88 MILLION/UL (ref 3.88–5.62)
SODIUM SERPL-SCNC: 138 MMOL/L (ref 136–145)
T WAVE AXIS: 75 DEGREES
T WAVE AXIS: 76 DEGREES
T WAVE AXIS: 76 DEGREES
TSH SERPL DL<=0.05 MIU/L-ACNC: 1.2 UIU/ML (ref 0.36–3.74)
VENTRICULAR RATE: 103 BPM
VENTRICULAR RATE: 79 BPM
VENTRICULAR RATE: 82 BPM
WBC # BLD AUTO: 7.61 THOUSAND/UL (ref 4.31–10.16)

## 2022-03-02 PROCEDURE — 93010 ELECTROCARDIOGRAM REPORT: CPT

## 2022-03-02 PROCEDURE — 99284 EMERGENCY DEPT VISIT MOD MDM: CPT

## 2022-03-02 PROCEDURE — 84443 ASSAY THYROID STIM HORMONE: CPT | Performed by: EMERGENCY MEDICINE

## 2022-03-02 PROCEDURE — 99285 EMERGENCY DEPT VISIT HI MDM: CPT | Performed by: EMERGENCY MEDICINE

## 2022-03-02 PROCEDURE — 99283 EMERGENCY DEPT VISIT LOW MDM: CPT

## 2022-03-02 PROCEDURE — 70450 CT HEAD/BRAIN W/O DYE: CPT

## 2022-03-02 PROCEDURE — 93005 ELECTROCARDIOGRAM TRACING: CPT

## 2022-03-02 PROCEDURE — 96361 HYDRATE IV INFUSION ADD-ON: CPT

## 2022-03-02 PROCEDURE — 80053 COMPREHEN METABOLIC PANEL: CPT | Performed by: EMERGENCY MEDICINE

## 2022-03-02 PROCEDURE — 85025 COMPLETE CBC W/AUTO DIFF WBC: CPT | Performed by: EMERGENCY MEDICINE

## 2022-03-02 PROCEDURE — 96374 THER/PROPH/DIAG INJ IV PUSH: CPT

## 2022-03-02 PROCEDURE — G1004 CDSM NDSC: HCPCS

## 2022-03-02 PROCEDURE — 84484 ASSAY OF TROPONIN QUANT: CPT | Performed by: EMERGENCY MEDICINE

## 2022-03-02 PROCEDURE — 36415 COLL VENOUS BLD VENIPUNCTURE: CPT | Performed by: EMERGENCY MEDICINE

## 2022-03-02 RX ORDER — POTASSIUM CHLORIDE 20 MEQ/1
40 TABLET, EXTENDED RELEASE ORAL ONCE
Status: DISCONTINUED | OUTPATIENT
Start: 2022-03-02 | End: 2022-03-02

## 2022-03-02 RX ORDER — ACETAMINOPHEN 325 MG/1
975 TABLET ORAL ONCE
Status: DISCONTINUED | OUTPATIENT
Start: 2022-03-02 | End: 2022-03-02

## 2022-03-02 RX ORDER — METOPROLOL TARTRATE 5 MG/5ML
5 INJECTION INTRAVENOUS ONCE
Status: COMPLETED | OUTPATIENT
Start: 2022-03-02 | End: 2022-03-02

## 2022-03-02 RX ORDER — POTASSIUM CHLORIDE 20 MEQ/1
40 TABLET, EXTENDED RELEASE ORAL ONCE
Status: COMPLETED | OUTPATIENT
Start: 2022-03-02 | End: 2022-03-02

## 2022-03-02 RX ADMIN — POTASSIUM CHLORIDE 40 MEQ: 1500 TABLET, EXTENDED RELEASE ORAL at 19:09

## 2022-03-02 RX ADMIN — SODIUM CHLORIDE 1000 ML: 0.9 INJECTION, SOLUTION INTRAVENOUS at 15:21

## 2022-03-02 RX ADMIN — METOROPROLOL TARTRATE 5 MG: 5 INJECTION, SOLUTION INTRAVENOUS at 18:20

## 2022-03-02 RX ADMIN — SODIUM CHLORIDE 1000 ML: 0.9 INJECTION, SOLUTION INTRAVENOUS at 18:20

## 2022-03-02 SDOH — ECONOMIC STABILITY - HOUSING INSECURITY: HOMELESSNESS UNSPECIFIED: Z59.00

## 2022-03-02 NOTE — ED PROVIDER NOTES
History  Chief Complaint   Patient presents with    Fall     pt arrived via EMS  Pt states he drank 75oz of beer today was at the [employer], had a hot flash "I had two jackets on" and passed out  Pt does't remember if he hit his head, - blood thinners  29 yo M h/o HTN, GERD, schizophrenia, alcohol abuse, homeless; presenting for evaluation of syncopal event  Pt was at [employer] getting his haircut, was wearing multiple layers- 3 shirts and 2 heavy jackets and when he got up from seated position he felt flushed/lightheaded and had syncopal event  Unknown how long he was unconscious for  Unsure if he struck his head, but does c/o HA and states no HA prior to the event  Mild diffuse HA and c/o L upper arm and R forearm discomfort  No blood thinners  States history of syncopal event years ago, but unclear surrounding details  Reports recent mild URI sx  Denies HA, CP, SOB, abdominal pain or any other sx prior to the event  He does admit to drinking beer today- states he does not drink every day, will drink for 3 days at a time, trying to quit and states he attends therapy/meetings, he is unsure if he is interested in rehab  Denies drug use  Per records- sees PCP but is not compliant with meds, no current daily meds    29 yo M with syncopal event- likely vasovagal- will check EKG/cardiac monitor, labs to r/o metabolic derangement, CTH to r/o bleed given possible head injury/HA/ETOH, closely monitor               Prior to Admission Medications   Prescriptions Last Dose Informant Patient Reported? Taking?    ELDERBERRY PO  Self Yes No   Sig: Take by mouth daily    Skin Protectants, Misc  (eucerin) cream   No No   Sig: Apply topically as needed for wound care   hydrocortisone 2 5 % ointment   No No   Sig: Apply topically 2 (two) times a day   hydrocortisone 2 5 % ointment   No No   Sig: Apply topically 2 (two) times a day   neomycin-bacitracin-polymyxin (NEOSPORIN) 5-400-5,000 ointment   No No   Sig: Apply topically 4 (four) times a day      Facility-Administered Medications: None       Past Medical History:   Diagnosis Date    Anxiety     Last assessed 10/18/16    Asthma     Last assessed 03/15/13    Closed fracture of nasal bone 4/24/2018    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    Intermittent explosive disorder     Oppositional defiant disorder     Palpitation 2/13/2019    Schizophrenia ([facility] [address] )     last assessed 12/04/17    Scoliosis        Past Surgical History:   Procedure Laterality Date    HERNIA REPAIR      NO PAST SURGERIES      MA LAP,APPENDECTOMY N/A 1/9/2019    Procedure: APPENDECTOMY LAPAROSCOPIC;  Surgeon: Felisha Coello MD;  Location: [facility];  Service: General    MA LAP,DIAGNOSTIC ABDOMEN N/A 1/9/2019    Procedure: LAPAROSCOPY DIAGNOSTIC;  Surgeon: Felisha Coello MD;  Location[facility];  Service: General       Family History   Problem Relation Age of Onset    Diabetes Mother     Bipolar disorder Family     Schizophrenia Family     Diabetes type II Family      I have reviewed and agree with the history as documented  E-Cigarette/Vaping    E-Cigarette Use Never User      E-Cigarette/Vaping Substances     Social History     Tobacco Use    Smoking status: Current Every Day Smoker     Packs/day: 0 50     Years: 4 00     Pack years: 2 00     Types: Cigarettes    Smokeless tobacco: Never Used   Vaping Use    Vaping Use: Never used   Substance Use Topics    Alcohol use: Yes     Comment: beer, wine, whiskey    Drug use: Not Currently     Comment: previous Marijuana use        Review of Systems   Constitutional: Negative for chills, fever and unexpected weight change  HENT: Negative for ear pain, rhinorrhea and sore throat  Eyes: Negative for pain and visual disturbance  Respiratory: Negative for cough and shortness of breath  Cardiovascular: Negative for chest pain and leg swelling  Gastrointestinal: Negative for abdominal pain, constipation, diarrhea, nausea and vomiting  Endocrine: Negative for polydipsia, polyphagia and polyuria  Genitourinary: Negative for dysuria, frequency, hematuria and urgency  Musculoskeletal: Negative for back pain, myalgias and neck pain  Skin: Negative for color change and rash  Allergic/Immunologic: Negative for environmental allergies and immunocompromised state  Neurological: Positive for syncope and headaches  Negative for dizziness, seizures, weakness, light-headedness and numbness  Hematological: Negative for adenopathy  Does not bruise/bleed easily  Psychiatric/Behavioral: Negative for agitation and confusion  All other systems reviewed and are negative  Physical Exam  Physical Exam  Vitals and nursing note reviewed  Constitutional:       General: He is not in acute distress  Appearance: He is well-developed  Comments: No external signs of trauma noted   HENT:      Head: Normocephalic and atraumatic  Comments: No scalp ttp  Nose: Nose normal  No congestion  Eyes:      Conjunctiva/sclera: Conjunctivae normal    Neck:      Comments: No midline C/T/L spine ttp, no stepoff/deformity  Cardiovascular:      Rate and Rhythm: Regular rhythm  Tachycardia present  Heart sounds: Normal heart sounds  Pulmonary:      Effort: Pulmonary effort is normal  No respiratory distress  Breath sounds: Normal breath sounds  No stridor  No wheezing or rales  Chest:      Chest wall: No tenderness  Abdominal:      General: There is no distension  Palpations: Abdomen is soft  Tenderness: There is no abdominal tenderness  There is no guarding or rebound  Musculoskeletal:         General: No swelling, tenderness, deformity or signs of injury  Normal range of motion  Cervical back: Normal range of motion and neck supple  Right lower leg: No edema  Left lower leg: No edema  Skin:     General: Skin is warm and dry  Findings: No rash  Neurological:      General: No focal deficit present  Mental Status: He is alert and oriented to person, place, and time  Cranial Nerves: No cranial nerve deficit  Sensory: No sensory deficit  Motor: No weakness or abnormal muscle tone  Coordination: Coordination normal       Gait: Gait normal       Deep Tendon Reflexes: Reflexes normal    Psychiatric:         Mood and Affect: Mood normal          Behavior: Behavior normal          Thought Content: Thought content normal          Judgment: Judgment normal          Vital Signs  ED Triage Vitals [03/02/22 1429]   Temperature Pulse Respirations Blood Pressure SpO2   98 6 °F (37 °C) 96 16 129/81 99 %      Temp Source Heart Rate Source Patient Position - Orthostatic VS BP Location FiO2 (%)   Oral Monitor Sitting Right arm --      Pain Score       No Pain           Vitals:    03/02/22 1429 03/02/22 1642 03/02/22 1816 03/02/22 1952   BP: 129/81 119/75 155/94 139/85   Pulse: 96 100 (!) 106 76   Patient Position - Orthostatic VS: Sitting Lying Lying          Visual Acuity      ED Medications  Medications   sodium chloride 0 9 % bolus 1,000 mL (0 mL Intravenous Stopped 3/2/22 1621)   metoprolol (LOPRESSOR) injection 5 mg (5 mg Intravenous Given 3/2/22 1820)   sodium chloride 0 9 % bolus 1,000 mL (0 mL Intravenous Stopped 3/2/22 1954)   potassium chloride (K-DUR,KLOR-CON) CR tablet 40 mEq (40 mEq Oral Given 3/2/22 1909)       Diagnostic Studies  Results Reviewed     Procedure Component Value Units Date/Time    TSH, 3rd generation with Free T4 reflex [[id number]]  (Normal) Collected: 03/02/22 1820    Lab Status: Final result Specimen: Blood from Arm, Right Updated: 03/02/22 1911     TSH 3RD GENERATON 1 198 uIU/mL     Narrative:      Patients undergoing fluorescein dye angiography may retain small amounts of fluorescein in the body for 48-72 hours post procedure  Samples containing fluorescein can produce falsely depressed TSH values   If the patient had this procedure,a specimen should be resubmitted post fluorescein clearance        HS Troponin I 2hr [[id number]]  (Normal) Collected: 03/02/22 1820    Lab Status: Final result Specimen: Blood from Arm, Right Updated: 03/02/22 1906     hs TnI 2hr 7 ng/L      Delta 2hr hsTnI >5 ng/L     HS Troponin 0hr (reflex protocol) [[id number]]  (Normal) Collected: 03/02/22 1543    Lab Status: Final result Specimen: Blood from Arm, Right Updated: 03/02/22 1611     hs TnI 0hr <2 ng/L     HS Troponin I 4hr [[id number]]     Lab Status: No result Specimen: Blood     Comprehensive metabolic panel [[id number]]  (Abnormal) Collected: 03/02/22 1521    Lab Status: Final result Specimen: Blood from Arm, Right Updated: 03/02/22 1602     Sodium 138 mmol/L      Potassium 3 5 mmol/L      Chloride 100 mmol/L      CO2 27 mmol/L      ANION GAP 11 mmol/L      BUN 8 mg/dL      Creatinine 0 79 mg/dL      Glucose 88 mg/dL      Calcium 9 0 mg/dL      AST 21 U/L      ALT 38 U/L      Alkaline Phosphatase 60 U/L      Total Protein 7 0 g/dL      Albumin 4 1 g/dL      Total Bilirubin 1 36 mg/dL      eGFR 122 ml/min/1 73sq m     Narrative:      [place] guidelines for Chronic Kidney Disease (CKD):     Stage 1 with normal or high GFR (GFR > 90 mL/min/1 73 square meters)    Stage 2 Mild CKD (GFR = 60-89 mL/min/1 73 square meters)    Stage 3A Moderate CKD (GFR = 45-59 mL/min/1 73 square meters)    Stage 3B Moderate CKD (GFR = 30-44 mL/min/1 73 square meters)    Stage 4 Severe CKD (GFR = 15-29 mL/min/1 73 square meters)    Stage 5 End Stage CKD (GFR <15 mL/min/1 73 square meters)  Note: GFR calculation is accurate only with a steady state creatinine    CBC and differential [[id number]] Collected: 03/02/22 1521    Lab Status: Final result Specimen: Blood from Arm, Right Updated: 03/02/22 1529     WBC 7 61 Thousand/uL      RBC 4 88 Million/uL      Hemoglobin 16 6 g/dL      Hematocrit 46 2 %      MCV 95 fL      MCH 34 0 pg      MCHC 35 9 g/dL      RDW 12 6 %      MPV 9 6 fL      Platelets 150 Thousands/uL      nRBC 0 /100 WBCs      Neutrophils Relative 68 %      Immat GRANS % 0 %      Lymphocytes Relative 15 %      Monocytes Relative 11 %      Eosinophils Relative 5 %      Basophils Relative 1 %      Neutrophils Absolute 5 25 Thousands/µL      Immature Grans Absolute 0 03 Thousand/uL      Lymphocytes Absolute 1 10 Thousands/µL      Monocytes Absolute 0 81 Thousand/µL      Eosinophils Absolute 0 38 Thousand/µL      Basophils Absolute 0 04 Thousands/µL                  CT head without contrast   ED Interpretation by Manuel Hendricks DO (03/02 1609)   IMPRESSION:     No acute intracranial abnormality  Final Result by Trinidad Marinelli MD (03/02 1608)      No acute intracranial abnormality  Workstation performed: [id number]                    Procedures  Procedures         ED Course  ED Course as of 03/02/22 2153   Wed Mar 02, 2022   1526 EKG: tachycardic @ 103 bpm, junctional with p wave following QRS and inverted in II/III/avF, RAD, qtc 440, nonspecific st changes, t wave inversions avL, v1, v2, EKG changed from previous February 2019   [address] [place] cardiology, Dr Tristen Hills regarding EKG, requests placing consult and they will see pt in ER   1747 Dr Timmy Brown evaluated pt at bedside, recommends giving 5mg Lopressor IV and will give 2nd L fluids and reassess  If he breaks to NSR, no further workup/management needed, if it is persistent recommends admission and would see him inpt again tomorrow   1832 Pt received Lopressor about 10-15 minutes ago- he broke to NSR in 80s on telemetry, will allow fluids to finish, pending repeat trop/tsh    1909 hs TnI 2hr: 7   1910 Delta 2hr hsTnI: >5   1947 Repeat EKG: NSR @ 82 bpm, RAD, normal intervals, , qtc 425, nonspecific st changes, t wave inversions avL/v1/v2   1950 Messaged cardiology to update that pt broke to NSR, but to see recommendations for dispo regarding the delta troponin   Per Dr Timmy Brown, pt is okay for discharge and outpt follow up 2001 Pt is clinically sober, reports feeling tired, otherwise well  Reviewed results and plan for PCP/cards f/u  He is currently homeless- provided shelter list  For ETOH- states he wants to continue his current outpt resources he is doing, declines rehab             HEART Risk Score      Most Recent Value   Heart Score Risk Calculator    History 0 Filed at: 03/02/2022 1623   ECG 1 Filed at: 03/02/2022 1623   Age 0 Filed at: 03/02/2022 1623   Risk Factors 0 Filed at: 03/02/2022 1623   Troponin 0 Filed at: 03/02/2022 1623   HEART Score 1 Filed at: 03/02/2022 1623                        SBIRT 20yo+      Most Recent Value   SBIRT (25 yo +)    In order to provide better care to our patients, we are screening all of our patients for alcohol and drug use  Would it be okay to ask you these screening questions? No Filed at: 03/02/2022 1432                    MDM  Number of Diagnoses or Management Options  Accelerated junctional rhythm  Alcohol abuse  Homeless  Syncope and collapse  Diagnosis management comments: 27 yo M presenting after syncopal episode, seems vasovagal/orthostatic given description, however pt had abnormal EKG/rhythm with accelerated junctional rhythm  Cardiology was consulted and after 2L fluids and 5mg IV Lopressor, this broke to NSR  Delta trop increased, but likely from the tachycardia and cardiology states no further current workup or admission needed    - Alcohol use- has outpt resources/therapy, declines rehab/HOST  - Homeless- shelter list provided    Instructed to f/u with PCP and Cardiology   Return precautions reviewed and pt expressed understanding with plan       Amount and/or Complexity of Data Reviewed  Clinical lab tests: ordered and reviewed  Tests in the radiology section of CPT®: ordered and reviewed  Tests in the medicine section of CPT®: ordered and reviewed  Review and summarize past medical records: yes  Discuss the patient with other providers: yes (Cardiology,   Stephanie Wilkinson  Independent visualization of images, tracings, or specimens: yes        Disposition  Final diagnoses:   Syncope and collapse   Accelerated junctional rhythm   Alcohol abuse   Homeless     Time reflects when diagnosis was documented in both MDM as applicable and the Disposition within this note     Time User Action Codes Description Comment    3/2/2022  3:46 PM Lannis Hernandez A Add [R55] Syncope and collapse     3/2/2022  7:52 PM Lannis Hernandez A Add [I49 8] Accelerated junctional rhythm     3/2/2022  7:52 PM Chey Louisllor Add [F10 10] Alcohol abuse     3/2/2022  8:01 PM Chey Irrigon Add [Z59 00] Homeless       ED Disposition     ED Disposition Condition Date/Time Comment    Discharge Stable Wed Mar 2, 2022  7:52 PM Yany Fisher discharge to home/self care  Follow-up Information     Follow up With Specialties Details Why Contact Info Additional 30 [facility], [address] [place] [address], Nurse Practitioner   [address]        [address] [facility] Cardiology   [facility] [id number]  [address] [place] [address] Cardiology [facility], [address] [place] [address], [place], [place], [address] [phone]          Discharge Medication List as of 3/2/2022  8:01 PM      CONTINUE these medications which have NOT CHANGED    Details   ELDERBERRY PO Take by mouth daily , Historical Med      !! hydrocortisone 2 5 % ointment Apply topically 2 (two) times a day, Starting Wed 1/6/2021, Normal      !! hydrocortisone 2 5 % ointment Apply topically 2 (two) times a day, Starting Wed 9/29/2021, Normal      neomycin-bacitracin-polymyxin (NEOSPORIN) 5-400-5,000 ointment Apply topically 4 (four) times a day, Starting Wed 9/29/2021, Normal      Skin Protectants, Misc  (eucerin) cream Apply topically as needed for wound care, Starting Wed 9/29/2021, Normal       !! - Potential duplicate medications found   Please discuss with provider  No discharge procedures on file      PDMP Review     None          ED Provider  Electronically Signed by           Aaron Cisneros DO  03/02/22 6133

## 2022-03-02 NOTE — CONSULTS
Cardiology Consultation  MD Lula Holm MD Liborio Evan, DO, [address] [facility] MD Isabel Jean Baptiste DO, Jose Rios DO, [facility] [place]  ----------------------------------------------------------------  [address]  [address]  [place], [address] [facility] 28 y o  male MRN: [id number]  Unit/Bed#: WIOB68 Encounter: [id number]      03/02/22    Referring Physician: Sharon Willett DO    Chief Complain/Reason for Referal:  Syncope, junctional tachycardia    IMPRESSION:  1  Syncope -likely vasovagal  2  Junctional tachycardia  3  Alcohol abuse    DISCUSSION/RECOMMENDATIONS:  Wild Villafuerte He presents today with syncope which appears to be most likely vasovagal in nature and or possibly orthostatic  He states he was wearing several shirts as well as 2 heavy jackets, moved from a sitting to standing position, after having a haircut, and felt flushed lightheaded and passed out   His rhythm on telemetry is junctional tachycardia with retrograde P waves seen just after the QRS complexes  This is very stable and did not respond to vagal maneuvers   He states that he was drinking alcohol earlier today, did not drink any other fluids  He did receive 1 L normal saline in the ER   Would repeat another L normal saline and consider giving 5 mg IV Lopressor to see if this will restore normal sinus rhythm  If he remains in a junctional tachycardia, may place in observation and plan for echocardiogram tomorrow     Would replete potassium to a level greater than 4 0   And check a TSH    Ignacio Perez DO, [facility] [place]    ----------------------------------------------------  TELE:  Junctional tachycardia with retrograde P waves seen    ======================================================    HPI:  I am seeing this patient in cardiology consultation for:  Syncope    Verónica Saunders is a 28 y o  male with:     Past Medical History:   Diagnosis Date    Anxiety     Last assessed 10/18/16   Wild Villafuerte Asthma     Last assessed 03/15/13    Closed fracture of nasal bone 4/24/2018    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    Intermittent explosive disorder     Oppositional defiant disorder     Palpitation 2/13/2019    Schizophrenia ([facility] [address] )     last assessed 12/04/17    Scoliosis      He states that earlier he drank at around 75 oz of beer today, really did not drink much water at all, and after he had a haircut he had an episode of syncope  He states that he felt very warm and flushed and passed out  He states that he was wearing 3 sure it is in 2 heavy jackets when he got up from a seated position which is when he felt suddenly lightheaded and flushed and subsequently passed out  He denies any chest pain or shortness of breath or palpitations  Cardiology has been asked to evaluate the patient given his episode of syncope as well as his findings on telemetry which appeared to show a junctional tachycardia with retrograde VA conduction  Scheduled Meds:  Current Facility-Administered Medications   Medication Dose Route Frequency Provider Last Rate    sodium chloride  1,000 mL Intravenous Once Ana Sow A Hay, DO 1,000 mL (03/02/22 1820)     Continuous Infusions:   PRN Meds:  Allergies   Allergen Reactions    Other      Seasonal- sneezing, congestion     I reviewed the Home Medication list in the chart       Family History   Problem Relation Age of Onset    Diabetes Mother     Bipolar disorder Family     Schizophrenia Family     Diabetes type II Family        Social History     Socioeconomic History    Marital status: Single     Spouse name: Not on file    Number of children: Not on file    Years of education: Not on file    Highest education level: Not on file   Occupational History    Occupation: disabled   Tobacco Use    Smoking status: Current Every Day Smoker     Packs/day: 0 50     Years: 4 00     Pack years: 2 00     Types: Cigarettes    Smokeless tobacco: Never Used   Vaping Use    Vaping Use: Never used   Substance and Sexual Activity    Alcohol use: Yes     Comment: beer, wine, whiskey    Drug use: Not Currently     Comment: previous Marijuana use     Sexual activity: Not on file   Other Topics Concern    Not on file   Social History Narrative    No preference on Lutheran belief     Social Determinants of Health     Financial Resource Strain: Not on file   Food Insecurity: Not on file   Transportation Needs: Not on file   Physical Activity: Not on file   Stress: Not on file   Social Connections: Not on file   Intimate Partner Violence: Not on file   Housing Stability: Not on file       Review of Systems   Review of Systems   Constitutional: Negative for chills and fever  HENT: Negative for facial swelling and sore throat  Eyes: Negative for visual disturbance  Respiratory: Negative for cough, chest tightness, shortness of breath and wheezing  Cardiovascular: Negative for chest pain, palpitations and leg swelling  Gastrointestinal: Negative for abdominal pain, blood in stool, constipation, diarrhea, nausea and vomiting  Endocrine: Negative for cold intolerance and heat intolerance  Genitourinary: Negative for decreased urine volume, difficulty urinating, dysuria and hematuria  Musculoskeletal: Negative for arthralgias, back pain and myalgias  Skin: Negative for rash  Neurological: Positive for syncope  Negative for dizziness, weakness and numbness  Psychiatric/Behavioral: Negative for agitation, behavioral problems and confusion  The patient is not nervous/anxious         Vitals:    03/02/22 1816   BP: 155/94   Pulse: (!) 106   Resp: 16   Temp:    SpO2: 100%     I/O       02/28 0701  03/01 0700 03/01 0701  03/02 0700 03/02 0701  03/03 0700    IV Piggyback   1000    Total Intake(mL/kg)   1000 (13 4)    Net   +1000               Weight (last 2 days)     Date/Time Weight    03/02/22 1429 74 8 (165)          Physical Exam  Constitutional: awake, alert and oriented, in no acute distress, no obvious deformities  Head: Normocephalic, without obvious abnormality, atraumatic  Eyes: conjunctivae clear and moist  Sclera anicteric  No xanthelasmas  Pupils equal bilaterally  Extraocular motions are full  Ear nose mouth and throat: ears are symmetrical bilaterally, hearing appears to be equal bilaterally, no nasal discharge or epistaxis, oropharynx is clear with moist mucous membranes  Neck:  Trachea is midline, neck is supple, no thyromegaly or significant lymphadenopathy, there is full range of motion  Lungs: clear to auscultation bilaterally, no wheezes, no rales, no rhonchi, no accessory muscle use, breathing is nonlabored  Heart: regular rate and rhythm, S1, S2 normal, no murmur, no click, no rub and no gallop, no lower extremity edema  Abdomen: soft, non-tender; bowel sounds normal; no masses,  no organomegaly  Psychiatric:  Patient is oriented to time, place, person, mood/affect is negative for depression, anxiety, agitation, appears to have appropriate insight  Skin: Skin is warm, dry, intact  No obvious rashes or lesions on exposed extremities  Nail beds are pink with no cyanosis or clubbing  Results from last 7 days   Lab Units 03/02/22  1521   WBC Thousand/uL 7 61   HEMOGLOBIN g/dL 16 6   HEMATOCRIT % 46 2   PLATELETS Thousands/uL 150   NEUTROS PCT % 68   MONOS PCT % 11     Results from last 7 days   Lab Units 03/02/22  1521   POTASSIUM mmol/L 3 5   CHLORIDE mmol/L 100   CO2 mmol/L 27   BUN mg/dL 8   CREATININE mg/dL 0 79   CALCIUM mg/dL 9 0     Results from last 7 days   Lab Units 03/02/22  1521   POTASSIUM mmol/L 3 5   CHLORIDE mmol/L 100   CO2 mmol/L 27   BUN mg/dL 8   CREATININE mg/dL 0 79   CALCIUM mg/dL 9 0   ALK PHOS U/L 60   ALT U/L 38   AST U/L 21     No results found for: TROPONINT                            I have personally reviewed the EKG, CXR and Telemetry images directly        Patient Active Problem List    Diagnosis Date Noted    Dry skin 09/29/2021    Urethritis 11/30/2020    Dysuria 11/30/2020    Alcohol abuse 06/29/2020    Annual physical exam 03/02/2020    Acne 10/09/2019    Seasonal allergies 06/14/2019    Gastroesophageal reflux disease without esophagitis 05/08/2019    Hypertension 03/06/2019    Dizziness 03/06/2019    Bug bite 02/13/2019    Abdominal pain 01/08/2019    Encounter for hepatitis C virus screening test for high risk patient 12/06/2018    Tattoos 12/06/2018    Medicare annual wellness visit, subsequent 12/03/2018    Hypokalemia 08/28/2018    Chronic midline low back pain without sciatica 04/24/2018    Attention deficit hyperactivity disorder 10/18/2016    Intellectual disability 07/02/2014    Psychosis, bipolar affective ([facility] [address] ) 07/02/2014    Schizophrenia ([facility] [address] ) 07/02/2014    Scoliosis 07/02/2014    Tourette's syndrome 06/20/2013       Portions of the record may have been created with voice recognition software  Occasional wrong word or "sound a like" substitutions may have occurred due to the inherent limitations of voice recognition software  Read the chart carefully and recognize, using context, where substitutions have occurred      Elvira Olvera DO, [facility] [place]  3/2/2022 6:37 PM

## 2022-03-03 NOTE — DISCHARGE INSTRUCTIONS
Please follow up with family doctor and Cardiologist    Stay well hydrated  Stop drinking alcohol    Shelter list provided to you    Return to the ER if you have any new or worsening symptoms including but not limited to chest pain, difficulty breathing, lightheadedness, passing out, palpitations, etc

## 2022-03-07 NOTE — TELEPHONE ENCOUNTER
Patient no show for his appt and I called and lmom to let patient know to reschedule the appt and if he needs these forms filled out to reschedule the appt

## 2022-03-08 ENCOUNTER — APPOINTMENT (EMERGENCY)
Dept: CT IMAGING | Facility: HOSPITAL | Age: 27
End: 2022-03-08
Payer: MEDICARE

## 2022-03-08 ENCOUNTER — HOSPITAL ENCOUNTER (EMERGENCY)
Facility: HOSPITAL | Age: 27
Discharge: HOME | End: 2022-03-08
Attending: EMERGENCY MEDICINE
Payer: MEDICARE

## 2022-03-08 VITALS
BODY MASS INDEX: 21.43 KG/M2 | WEIGHT: 166.9 LBS | HEART RATE: 93 BPM | OXYGEN SATURATION: 99 % | TEMPERATURE: 97.8 F | DIASTOLIC BLOOD PRESSURE: 101 MMHG | RESPIRATION RATE: 18 BRPM | SYSTOLIC BLOOD PRESSURE: 171 MMHG

## 2022-03-08 DIAGNOSIS — R22.0 LEFT FACIAL SWELLING: ICD-10-CM

## 2022-03-08 DIAGNOSIS — S00.12XA TRAUMATIC ECCHYMOSIS OF EYELID, LEFT, INITIAL ENCOUNTER: ICD-10-CM

## 2022-03-08 DIAGNOSIS — T14.8XXA CONTUSION: ICD-10-CM

## 2022-03-08 DIAGNOSIS — S02.2XXA NASAL FRACTURE: Primary | ICD-10-CM

## 2022-03-08 DIAGNOSIS — S00.83XA CONTUSION OF FACE, INITIAL ENCOUNTER: ICD-10-CM

## 2022-03-08 DIAGNOSIS — T14.8XXA ABRASION: ICD-10-CM
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
## 2022-03-08 PROCEDURE — 99284 EMERGENCY DEPT VISIT MOD MDM: CPT

## 2022-03-08 PROCEDURE — G1004 CDSM NDSC: HCPCS

## 2022-03-08 PROCEDURE — 70450 CT HEAD/BRAIN W/O DYE: CPT

## 2022-03-08 PROCEDURE — 70486 CT MAXILLOFACIAL W/O DYE: CPT

## 2022-03-08 RX ORDER — IBUPROFEN 400 MG/1
600 TABLET ORAL EVERY 6 HOURS PRN
Qty: 20 TABLET | Refills: 0 | Status: SHIPPED | OUTPATIENT
Start: 2022-03-08

## 2022-03-08 NOTE — Clinical Note
Magalis Live was seen and treated in our emergency department on 3/8/2022  Diagnosis:     Hayden    He may return on this date: 03/11/2022         If you have any questions or concerns, please don't hesitate to call        Leda Vidales PA-C    ______________________________           _______________          _______________  Hospital Representative                              Date                                Time
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
## 2022-03-09 NOTE — DISCHARGE INSTRUCTIONS
Follow-up with ENT  They should call you to make an appointment if they do not call the number listed  Follow-up with your primary care provider concerning your other injuries  Take ibuprofen as needed for pain  Return to emergency department for new or worsening symptoms as discussed  CT findings:     FINDINGS:      FACIAL BONES:  There is a minimally displaced acute left nasal bone fracture  Normal alignment of the temporomandibular joints  No lytic or blastic lesion  The bony nasal septum is mildly deviated to the right  ORBITS:  Orbital globes, optic nerves, and extraocular muscles appear symmetric and normal  There is no evidence of retrobulbar mass, abscess, or hematoma  SINUSES:  There is mild mucosal thickening of the bilateral maxillary sinuses, ethmoid air cells, sphenoid sinuses and right frontal sinus  No air-fluid levels are identified  SOFT TISSUES:  There is mild left periorbital, maxillary and nasal soft tissue swelling  IMPRESSION:     Minimally displaced acute left nasal bone fracture  PARENCHYMA:  No intracranial mass, mass effect or midline shift  No CT signs of acute infarction  No acute parenchymal hemorrhage  VENTRICLES AND EXTRA-AXIAL SPACES:  Normal for the patient's age  VISUALIZED ORBITS AND PARANASAL SINUSES:  No acute abnormality involving the orbits  Mild scattered sinus mucosal thickening is noted  No fluid levels are seen  There is an acute partially visualized left nasal bone fracture  Please refer to the   report of a CT facial bones performed at same time for additional details  CALVARIUM AND EXTRACRANIAL SOFT TISSUES:  Normal      IMPRESSION:     No acute intracranial abnormality

## 2022-03-22 ENCOUNTER — TELEPHONE (OUTPATIENT)
Dept: INTERNAL MEDICINE CLINIC | Facility: CLINIC | Age: 27
End: 2022-03-22

## 2022-03-22 NOTE — TELEPHONE ENCOUNTER
Isela Bravo with [facility] adult protection, was looking into when was the pt's last office visit, under PCP Nazanin Grace  As stated from his chart, his last office visit was on [phone]  He also stated pt is currently homeless and he is looking into providing assistance for pt  I relayed information of his last office visit

## 2022-06-06 ENCOUNTER — TELEPHONE (OUTPATIENT)
Dept: INTERNAL MEDICINE CLINIC | Facility: CLINIC | Age: 27
End: 2022-06-06

## 2022-06-06 NOTE — TELEPHONE ENCOUNTER
Patient no showed for an appt with Rayne Thmoason today  There was paperwork Scanned in to [employer] that was supposed to be filled out during appt

## 2022-08-03 NOTE — TELEPHONE ENCOUNTER
LMOM for pt  To call back to confirm PCP  If still coming to [facility] pt  Needs to schedule AWV

## 2023-01-09 NOTE — DISCHARGE SUMMARY
Discharge Summary - [place] 24 y o  male MRN: [id number]    Unit/Bed#: E5 -01 Encounter: [id number]    Admission Date: 1/8/2019     Admitting Diagnosis: Hypokalemia [E87 6]  Schizophrenia ([address] ) [F20 9]  Abnormal CT of the abdomen [R93 5]  Intellectual disability [F79]  Unspecified abdominal pain [R10 9]  Abdominal pain, unspecified abdominal location [R10 9]  Leukocytosis, unspecified type [D72 829]    Discharge Date:     HPI:  79-year-old male with lower abdominal pain and CT findings of possible early acute appendicitis and leukocytosis    Procedures Performed:  Diagnostic laparoscopy with laparoscopic appendectomy    Hospital Course:  79-year-old male that presented with to the emergency department with lower abdominal pain with CT scan findings of early appendicitis  Due to persistent leukocytosis patient was taken to the operating room a diagnostic laparoscopy with appendectomy was performed  patient tolerated the procedure well  Diet activity was advanced  On postop day 1 he continued to have leukocytosis he was observed for another 24 hours, on postop day 2  Leukocytosis has resolved and he is to be discharged home in stable condition    Discharge Diagnosis:   1  Abdominal pain, unspecified abdominal location    2  Hypokalemia    3  Leukocytosis, unspecified type    4  Intellectual disability    5  Schizophrenia ([facility] [address] )    6  Abnormal CT of the abdomen    7  Lower abdominal pain    8  Appendicitis, unspecified appendicitis type        Condition at Discharge: good     Discharge Medications:  See after visit summary for reconciled discharge medications provided to patient and family  Discharge instructions/Information to patient and family:   See after visit summary for information provided to patient and family  Provisions for Follow-Up Care:  See after visit summary for information related to follow-up care and any pertinent home health orders        Disposition: See After Visit Summary Please advise pt calling again..    for discharge disposition information  Planned Readmission: No    Discharge Statement   I spent 15 minutes discharging the patient  This time was spent on the day of discharge  I had direct contact with the patient on the day of discharge  Additional documentation is required if more than 30 minutes were spent on discharge  Some portions of this record may have been generated with voice recognition software  There may be translation, syntax,  or grammatical errors  Occasional wrong word or "sound-a-like" substitutions may have occurred due to the inherent limitations of the voice recognition software  Read the chart carefully and recognize, using context, where substations may have occurred  If you have any questions, please contact the dictating provider for clarification or correction, as needed    This encounter has been coded  by a non certified Coder    Signature:   Stacey Williamson PA-C  Date: 1/11/2019 Time: 8:55 AM

## 2023-06-21 ENCOUNTER — OFFICE VISIT (OUTPATIENT)
Dept: FAMILY MEDICINE CLINIC | Facility: CLINIC | Age: 28
End: 2023-06-21
Payer: MEDICARE

## 2023-06-21 VITALS
HEART RATE: 105 BPM | DIASTOLIC BLOOD PRESSURE: 98 MMHG | HEIGHT: 72 IN | RESPIRATION RATE: 16 BRPM | BODY MASS INDEX: 20.99 KG/M2 | SYSTOLIC BLOOD PRESSURE: 140 MMHG | WEIGHT: 155 LBS | OXYGEN SATURATION: 99 % | TEMPERATURE: 98.3 F

## 2023-06-21 DIAGNOSIS — F20.9 SCHIZOPHRENIA, UNSPECIFIED TYPE (HCC): ICD-10-CM

## 2023-06-21 DIAGNOSIS — F51.5 NIGHTMARES: ICD-10-CM

## 2023-06-21 DIAGNOSIS — L23.7 POISON IVY DERMATITIS: ICD-10-CM

## 2023-06-21 DIAGNOSIS — R25.1 TREMORS OF NERVOUS SYSTEM: ICD-10-CM

## 2023-06-21 DIAGNOSIS — Z00.00 MEDICARE ANNUAL WELLNESS VISIT, SUBSEQUENT: Primary | ICD-10-CM

## 2023-06-21 DIAGNOSIS — F95.2 TOURETTE'S SYNDROME: ICD-10-CM

## 2023-06-21 DIAGNOSIS — I10 PRIMARY HYPERTENSION: ICD-10-CM

## 2023-06-21 DIAGNOSIS — Z13.220 SCREENING FOR LIPID DISORDERS: ICD-10-CM

## 2023-06-21 PROCEDURE — G0438 PPPS, INITIAL VISIT: HCPCS

## 2023-06-21 PROCEDURE — 99214 OFFICE O/P EST MOD 30 MIN: CPT

## 2023-06-21 RX ORDER — TRIAMCINOLONE ACETONIDE 5 MG/G
CREAM TOPICAL 2 TIMES DAILY
Qty: 30 G | Refills: 0 | Status: SHIPPED | OUTPATIENT
Start: 2023-06-21

## 2023-06-21 NOTE — ASSESSMENT & PLAN NOTE
Pt reports increased tremors and involuntary movements  Has dx of Tourette's syndrome and reports being lost to neurology care for several years  Pt requesting new referral for further evaluation, recommend also f/u with CBT

## 2023-06-21 NOTE — PROGRESS NOTES
Assessment and Plan:     Problem List Items Addressed This Visit        Cardiovascular and Mediastinum    Hypertension     BP currently elevated, pt reports being anxious and hx of white coat syndrome  Pt regularly exercises and eats healthy and no longer on antihypertensive medication  Pt will monitor BP at home does not wish to resume medication, recommend low Na diet and to f/u for consistently elevated BP readings  Relevant Orders    Comprehensive metabolic panel       Nervous and Auditory    Tourette's syndrome     Referral to neurology, no longer on antipsychotic  Referral provided for CBT to help with symptoms  Relevant Medications    triamcinolone (KENALOG) 0 5 % cream    Nightmares     Pt denies alcohol or drug use, has hx of schizophrenia and psychosis  Does not want medication therapy however agreeable to CBT, referral provided  Counseled on reducing stress and regular sleeping habits  Relevant Orders    Ambulatory Referral to Psychology       Musculoskeletal and Integument    Poison ivy dermatitis     Start topical corticosteroid to affected areas, continue antihistamine to help with itching as well as cool compress 15-20 min PRN  Relevant Medications    triamcinolone (KENALOG) 0 5 % cream       Other    Schizophrenia (HCC)     Pt not currently on any medications, states that symptoms have been stable does not feel psychology follow-up is necessary  Relevant Orders    Ambulatory Referral to Psychology    Medicare annual wellness visit, subsequent - Primary     Wellness visit completed, pt reports good health no decline from last wellness exam, routine labs ordered  Counseled on smoking cessation, pt reports no longer being homeless and able to get to appointments  Relevant Orders    CBC and differential    Tremors of nervous system     Pt reports increased tremors and involuntary movements   Has dx of Tourette's syndrome and reports being lost to neurology care for several years  Pt requesting new referral for further evaluation, recommend also f/u with CBT  Relevant Orders    Ambulatory Referral to Neurology    CBC and differential   Other Visit Diagnoses     Screening for lipid disorders        Relevant Orders    Lipid panel          Tobacco Cessation Counseling: Tobacco cessation counseling was provided  The patient is sincerely urged to quit consumption of tobacco  He is not ready to quit tobacco  Medication options discussed  Preventive health issues were discussed with patient, and age appropriate screening tests were ordered as noted in patient's After Visit Summary  Personalized health advice and appropriate referrals for health education or preventive services given if needed, as noted in patient's After Visit Summary  History of Present Illness:     Patient presents for a Medicare Wellness Visit    Pt presents to establish are and for Medicare wellness visit  Pt has chronic dx that were reviewed HTN, schizophrenia, Tourette's  Pt also reports poison ivy rash that started 2-3 days ago  Patient Care Team:  Lyndall Peabody as PCP - General (Nurse Practitioner)     Review of Systems:     Review of Systems   Constitutional: Negative for activity change, appetite change, chills, diaphoresis, fatigue, fever and unexpected weight change  HENT: Negative for congestion, dental problem, drooling, ear discharge, ear pain, facial swelling, hearing loss, mouth sores, nosebleeds, postnasal drip, rhinorrhea, sinus pressure, sinus pain, sneezing, sore throat, tinnitus, trouble swallowing and voice change  Eyes: Negative for photophobia, pain, discharge, redness, itching and visual disturbance  Respiratory: Negative for apnea, cough, choking, chest tightness, shortness of breath, wheezing and stridor  Cardiovascular: Negative for chest pain, palpitations and leg swelling     Gastrointestinal: Negative for abdominal distention, abdominal pain, anal bleeding, blood in stool, constipation, diarrhea, nausea and vomiting  Endocrine: Negative for cold intolerance, heat intolerance, polydipsia, polyphagia and polyuria  Genitourinary: Negative for decreased urine volume, difficulty urinating, dysuria, flank pain, frequency, hematuria, penile discharge, scrotal swelling, testicular pain and urgency  Musculoskeletal: Negative for arthralgias, back pain, gait problem, joint swelling, myalgias, neck pain and neck stiffness  Skin: Positive for color change and rash  Negative for wound  Allergic/Immunologic: Negative for environmental allergies, food allergies and immunocompromised state  Neurological: Negative for dizziness, tremors, seizures, syncope, facial asymmetry, weakness, light-headedness, numbness and headaches  Hematological: Negative for adenopathy  Does not bruise/bleed easily  Psychiatric/Behavioral: Negative for agitation, behavioral problems, confusion, decreased concentration, dysphoric mood, hallucinations, self-injury, sleep disturbance and suicidal ideas  The patient is not nervous/anxious and is not hyperactive  All other systems reviewed and are negative         Problem List:     Patient Active Problem List   Diagnosis   • Attention deficit hyperactivity disorder   • Intellectual disability   • Psychosis, bipolar affective ([facility] )   • Schizophrenia ([facility] )   • Scoliosis   • Tourette's syndrome   • Chronic midline low back pain without sciatica   • Hypokalemia   • Medicare annual wellness visit, subsequent   • Encounter for hepatitis C virus screening test for high risk patient   • Tattoos   • Abdominal pain   • Bug bite   • Hypertension   • Dizziness   • Gastroesophageal reflux disease without esophagitis   • Seasonal allergies   • Acne   • Annual physical exam   • Alcohol abuse   • Urethritis   • Dysuria   • Dry skin   • Poison ivy dermatitis   • Nightmares   • Tremors of nervous system      Past Medical and Surgical History:     Past Medical History:   Diagnosis Date   • Anxiety     Last assessed 10/18/16   • Asthma     Last assessed 03/15/13   • Closed fracture of nasal bone 4/24/2018   • 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   • Intermittent explosive disorder    • Oppositional defiant disorder    • Palpitation 2/13/2019   • Schizophrenia ([facility] )     last assessed 12/04/17   • Scoliosis      Past Surgical History:   Procedure Laterality Date   • HERNIA REPAIR     • NO PAST SURGERIES     • OK LAPAROSCOPIC APPENDECTOMY N/A 1/9/2019    Procedure: APPENDECTOMY LAPAROSCOPIC;  Surgeon: Nazanin Brewer MD;  Location: [facility];  Service: General   • OK LAPS ABD PRTM&OMENTUM DX W/WO SPEC BR/WA SPX N/A 1/9/2019    Procedure: LAPAROSCOPY DIAGNOSTIC;  Surgeon: Nazanin Brewer MD;  Location: [facility];  Service: General      Family History:     Family History   Problem Relation Age of Onset   • Diabetes Mother    • Bipolar disorder Family    • Schizophrenia Family    • Diabetes type II Family       Social History:     Social History     Socioeconomic History   • Marital status: Single     Spouse name: None   • Number of children: None   • Years of education: None   • Highest education level: None   Occupational History   • Occupation: disabled   Tobacco Use   • Smoking status: Every Day     Packs/day: 0 50     Years: 4 00     Total pack years: 2 00     Types: Cigarettes   • Smokeless tobacco: Never   Vaping Use   • Vaping Use: Never used   Substance and Sexual Activity   • Alcohol use: Yes     Comment: beer, wine, whiskey   • Drug use: Not Currently     Comment: previous Marijuana use    • Sexual activity: Yes     Partners: Female     Birth control/protection: None   Other Topics Concern   • None   Social History Narrative    No preference on Yazidi belief     Social Determinants of Health     Financial Resource Strain: Low Risk  (6/21/2023)    Overall Financial Resource Strain (CARDIA)    • Difficulty of Paying Living Expenses: Not hard at all   Food Insecurity: Not on file   Transportation Needs: No Transportation Needs (6/21/2023)    PRAPARE - Transportation    • Lack of Transportation (Medical): No    • Lack of Transportation (Non-Medical): No   Physical Activity: Not on file   Stress: Not on file   Social Connections: Not on file   Intimate Partner Violence: Not on file   Housing Stability: Not on file      Medications and Allergies:     Current Outpatient Medications   Medication Sig Dispense Refill   • hydrocortisone 2 5 % ointment Apply topically 2 (two) times a day 30 g 1   • ibuprofen (MOTRIN) 400 mg tablet Take 1 5 tablets (600 mg total) by mouth every 6 (six) hours as needed for moderate pain 20 tablet 0   • triamcinolone (KENALOG) 0 5 % cream Apply topically 2 (two) times a day 30 g 0   • ELDERBERRY PO Take by mouth daily  (Patient not taking: Reported on 3/8/2022)     • hydrocortisone 2 5 % ointment Apply topically 2 (two) times a day (Patient not taking: Reported on 6/21/2023) 30 g 0   • neomycin-bacitracin-polymyxin (NEOSPORIN) 5-400-5,000 ointment Apply topically 4 (four) times a day (Patient not taking: Reported on 6/21/2023) 28 3 g 0   • Skin Protectants, Misc  (eucerin) cream Apply topically as needed for wound care (Patient not taking: Reported on 6/21/2023) 397 g 0     No current facility-administered medications for this visit       Allergies   Allergen Reactions   • Other      Seasonal- sneezing, congestion      Immunizations:     Immunization History   Administered Date(s) Administered   • DTP 1994, 1994, 1994, 06/09/1995, 07/29/1998   • DTaP 1994, 1994, 1994, 06/09/1995, 07/29/1998   • Hep A, ped/adol, 2 dose 11/04/2008   • Hep B, Adolescent or Pediatric 1994, 1994, 1994   • Hepatitis A 01/04/2008, 10/20/2011   • HiB 1994, 1994, 1994, 06/09/1995   • INFLUENZA 12/05/2005, 01/04/2008, 10/28/2011   • IPV 1994, 1994, 1994, 07/29/1998   • MMR 1994, 03/10/1995, 07/29/1998   • Meningococcal MCV4P 12/05/2005   • Td (adult), Unspecified 12/05/2005   • Td (adult), adsorbed 12/05/2005   • Tdap 01/04/2008, 04/14/2018, 03/10/2020   • Tuberculin Skin Test-PPD Intradermal 02/14/2005, 03/15/2013, 10/18/2016, 12/10/2018   • Varicella 05/14/1997, 04/14/2011, 10/20/2011      Health Maintenance:         Topic Date Due   • HIV Screening  07/21/2023 (Originally 2/19/2009)   • Hepatitis C Screening  Completed         Topic Date Due   • COVID-19 Vaccine (1) Never done   • Pneumococcal Vaccine: Pediatrics (0 to 5 Years) and At-Risk Patients (6 to 59 Years) (1 - PCV) Never done   • Influenza Vaccine (Season Ended) 09/01/2023      Medicare Screening Tests and Risk Assessments:     Tami Boast is here for his Subsequent Wellness visit  Last Medicare Wellness visit information reviewed, patient interviewed and updates made to the record today  Health Risk Assessment:   Patient rates overall health as good  Patient feels that their physical health rating is same  Patient is satisfied with their life  Eyesight was rated as same  Hearing was rated as same  Patient feels that their emotional and mental health rating is same  Patients states they are never, rarely angry  Patient states they are sometimes unusually tired/fatigued  Pain experienced in the last 7 days has been some  Patient's pain rating has been 7/10  Patient states that he has experienced no weight loss or gain in last 6 months  Depression Screening:   PHQ-2 Score: 0      Fall Risk Screening: In the past year, patient has experienced: no history of falling in past year      Home Safety:  Patient does not have trouble with stairs inside or outside of their home  Patient has working smoke alarms and has working carbon monoxide detector  Home safety hazards include: none  Nutrition:   Current diet is Regular       Medications:   Patient is not currently taking any over-the-counter supplements  Patient is able to manage medications  Activities of Daily Living (ADLs)/Instrumental Activities of Daily Living (IADLs):   Walk and transfer into and out of bed and chair?: Yes  Dress and groom yourself?: Yes    Bathe or shower yourself?: Yes    Feed yourself? Yes  Do your laundry/housekeeping?: Yes  Manage your money, pay your bills and track your expenses?: Yes  Make your own meals?: Yes    Do your own shopping?: Yes    Previous Hospitalizations:   Any hospitalizations or ED visits within the last 12 months?: No      Advance Care Planning:   Living will: No    Durable POA for healthcare: No    Advanced directive: No    Advanced directive counseling given: Yes    Five wishes given: Yes      Cognitive Screening:   Provider or family/friend/caregiver concerned regarding cognition?: No    PREVENTIVE SCREENINGS      Cardiovascular Screening:    General: Risks and Benefits Discussed    Due for: Lipid Panel      Diabetes Screening:     General: Risks and Benefits Discussed    Due for: Blood Glucose      Colorectal Cancer Screening:     General: Screening Not Indicated      Prostate Cancer Screening:    General: Screening Not Indicated      Osteoporosis Screening:    General: Screening Not Indicated      Abdominal Aortic Aneurysm (AAA) Screening:    Risk factors include: tobacco use        General: Screening Not Indicated      Lung Cancer Screening:     General: Screening Not Indicated      Hepatitis C Screening:    General: Screening Current    Screening, Brief Intervention, and Referral to Treatment (SBIRT)    Screening  Typical number of drinks in a day: 0  Typical number of drinks in a week: 8  Interpretation: Low risk drinking behavior      Single Item Drug Screening:  How often have you used an illegal drug (including marijuana) or a prescription medication for non-medical reasons in the past year? never    Single Item Drug Screen Score: 0  Interpretation: Negative screen for possible drug use disorder    Review of Current Opioid Use    Opioid Risk Tool (ORT) Interpretation: Complete Opioid Risk Tool (ORT)    No results found  Physical Exam:     /98 (BP Location: Left arm, Patient Position: Sitting, Cuff Size: Standard)   Pulse 105   Temp 98 3 °F (36 8 °C) (Tympanic)   Resp 16   Ht 6' (1 829 m)   Wt 70 3 kg (155 lb)   SpO2 99%   BMI 21 02 kg/m²     Physical Exam  Vitals and nursing note reviewed  Constitutional:       General: He is not in acute distress  Appearance: Normal appearance  He is normal weight  He is not ill-appearing, toxic-appearing or diaphoretic  HENT:      Head: Normocephalic and atraumatic  Right Ear: Tympanic membrane, ear canal and external ear normal  There is no impacted cerumen  Left Ear: Tympanic membrane, ear canal and external ear normal  There is no impacted cerumen  Nose: Nose normal  No congestion or rhinorrhea  Mouth/Throat:      Mouth: Mucous membranes are moist       Pharynx: No oropharyngeal exudate or posterior oropharyngeal erythema  Eyes:      General: No scleral icterus  Right eye: No discharge  Left eye: No discharge  Extraocular Movements: Extraocular movements intact  Conjunctiva/sclera: Conjunctivae normal       Pupils: Pupils are equal, round, and reactive to light  Neck:      Vascular: No carotid bruit  Cardiovascular:      Rate and Rhythm: Normal rate and regular rhythm  Pulses: Normal pulses  Heart sounds: Normal heart sounds  No murmur heard  Pulmonary:      Effort: Pulmonary effort is normal  No respiratory distress  Breath sounds: Normal breath sounds  No stridor  No wheezing, rhonchi or rales  Chest:      Chest wall: No tenderness  Abdominal:      General: Bowel sounds are normal  There is no distension  Palpations: Abdomen is soft  There is no mass  Tenderness: There is no abdominal tenderness   There is no right CVA tenderness, left CVA tenderness, guarding or rebound  Hernia: No hernia is present  Genitourinary:     Testes: Normal    Musculoskeletal:         General: No swelling, tenderness, deformity or signs of injury  Normal range of motion  Cervical back: Normal range of motion and neck supple  No rigidity or tenderness  Right lower leg: No edema  Left lower leg: No edema  Lymphadenopathy:      Cervical: No cervical adenopathy  Skin:     General: Skin is warm  Capillary Refill: Capillary refill takes less than 2 seconds  Findings: No erythema, lesion or rash  Neurological:      General: No focal deficit present  Mental Status: He is alert and oriented to person, place, and time  Cranial Nerves: No cranial nerve deficit  Sensory: No sensory deficit  Motor: No weakness  Coordination: Coordination normal       Gait: Gait normal       Deep Tendon Reflexes: Reflexes normal    Psychiatric:         Attention and Perception: Attention and perception normal  He is attentive  He does not perceive auditory or visual hallucinations  Mood and Affect: Mood normal  Mood is not anxious, depressed or elated  Affect is not labile, blunt, flat, angry, tearful or inappropriate  Speech: Speech normal  He is communicative  Speech is not rapid and pressured, delayed or tangential          Behavior: Behavior normal  Behavior is not agitated, aggressive or hyperactive  Behavior is cooperative  Thought Content: Thought content normal  Thought content is not paranoid or delusional  Thought content does not include homicidal or suicidal ideation  Thought content does not include homicidal or suicidal plan  Cognition and Memory: Cognition is impaired (mild)  Memory is not impaired  Judgment: Judgment normal  Judgment is not impulsive            [id number] [place]

## 2023-06-21 NOTE — ASSESSMENT & PLAN NOTE
Wellness visit completed, pt reports good health no decline from last wellness exam, routine labs ordered  Counseled on smoking cessation, pt reports no longer being homeless and able to get to appointments

## 2023-06-21 NOTE — ASSESSMENT & PLAN NOTE
Start topical corticosteroid to affected areas, continue antihistamine to help with itching as well as cool compress 15-20 min PRN

## 2023-06-21 NOTE — ASSESSMENT & PLAN NOTE
Pt denies alcohol or drug use, has hx of schizophrenia and psychosis  Does not want medication therapy however agreeable to CBT, referral provided  Counseled on reducing stress and regular sleeping habits  none

## 2023-06-21 NOTE — ASSESSMENT & PLAN NOTE
Pt not currently on any medications, states that symptoms have been stable does not feel psychology follow-up is necessary

## 2023-06-21 NOTE — PATIENT INSTRUCTIONS
Medicare Preventive Visit Patient Instructions  Thank you for completing your Welcome to Medicare Visit or Medicare Annual Wellness Visit today  Your next wellness visit will be due in one year (6/21/2024)  The screening/preventive services that you may require over the next 5-10 years are detailed below  Some tests may not apply to you based off risk factors and/or age  Screening tests ordered at today's visit but not completed yet may show as past due  Also, please note that scanned in results may not display below  Preventive Screenings:  Service Recommendations Previous Testing/Comments   Colorectal Cancer Screening  · Colonoscopy    · Fecal Occult Blood Test (FOBT)/Fecal Immunochemical Test (FIT)  · Fecal DNA/Cologuard Test  · Flexible Sigmoidoscopy Age: 39-70 years old   Colonoscopy: every 10 years (May be performed more frequently if at higher risk)  OR  FOBT/FIT: every 1 year  OR  Cologuard: every 3 years  OR  Sigmoidoscopy: every 5 years  Screening may be recommended earlier than age 39 if at higher risk for colorectal cancer  Also, an individualized decision between you and your healthcare provider will decide whether screening between the ages of 74-80 would be appropriate   Colonoscopy: Not on file  FOBT/FIT: Not on file  Cologuard: Not on file  Sigmoidoscopy: Not on file    Screening Not Indicated     Prostate Cancer Screening Individualized decision between patient and health care provider in men between ages of 53-78   Medicare will cover every 12 months beginning on the day after your 50th birthday PSA: No results in last 5 years     Screening Not Indicated     Hepatitis C Screening Once for adults born between 1945 and 1965  More frequently in patients at high risk for Hepatitis C Hep C Antibody: 02/20/2019    Screening Current   Diabetes Screening 1-2 times per year if you're at risk for diabetes or have pre-diabetes Fasting glucose: 95 mg/dL (8/30/2021)  A1C: No results in last 5 years (No results in last 5 years)      Cholesterol Screening Once every 5 years if you don't have a lipid disorder  May order more often based on risk factors  Lipid panel: 08/30/2021  Screening Current      Other Preventive Screenings Covered by Medicare:  1  Abdominal Aortic Aneurysm (AAA) Screening: covered once if your at risk  You're considered to be at risk if you have a family history of AAA or a male between the age of 73-68 who smoking at least 100 cigarettes in your lifetime  2  Lung Cancer Screening: covers low dose CT scan once per year if you meet all of the following conditions: (1) Age 50-69; (2) No signs or symptoms of lung cancer; (3) Current smoker or have quit smoking within the last 15 years; (4) You have a tobacco smoking history of at least 20 pack years (packs per day x number of years you smoked); (5) You get a written order from a healthcare provider  3  Glaucoma Screening: covered annually if you're considered high risk: (1) You have diabetes OR (2) Family history of glaucoma OR (3)  aged 48 and older OR (3)  American aged 72 and older  3  Osteoporosis Screening: covered every 2 years if you meet one of the following conditions: (1) Have a vertebral abnormality; (2) On glucocorticoid therapy for more than 3 months; (3) Have primary hyperparathyroidism; (4) On osteoporosis medications and need to assess response to drug therapy  5  HIV Screening: covered annually if you're between the age of 12-76  Also covered annually if you are younger than 13 and older than 72 with risk factors for HIV infection  For pregnant patients, it is covered up to 3 times per pregnancy      Immunizations:  Immunization Recommendations   Influenza Vaccine Annual influenza vaccination during flu season is recommended for all persons aged >= 6 months who do not have contraindications   Pneumococcal Vaccine   * Pneumococcal conjugate vaccine = PCV13 (Prevnar 13), PCV15 (Vaxneuvance), PCV20 (Prevnar 20)  * Pneumococcal polysaccharide vaccine = PPSV23 (Pneumovax) Adults 2364 years old: 1-3 doses may be recommended based on certain risk factors  Adults 72 years old: 1-2 doses may be recommended based off what pneumonia vaccine you previously received   Hepatitis B Vaccine 3 dose series if at intermediate or high risk (ex: diabetes, end stage renal disease, liver disease)   Tetanus (Td) Vaccine - COST NOT COVERED BY MEDICARE PART B Following completion of primary series, a booster dose should be given every 10 years to maintain immunity against tetanus  Td may also be given as tetanus wound prophylaxis  Tdap Vaccine - COST NOT COVERED BY MEDICARE PART B Recommended at least once for all adults  For pregnant patients, recommended with each pregnancy  Shingles Vaccine (Shingrix) - COST NOT COVERED BY MEDICARE PART B  2 shot series recommended in those aged 48 and above     Health Maintenance Due:      Topic Date Due   • HIV Screening  07/21/2023 (Originally 2/19/2009)   • Hepatitis C Screening  Completed     Immunizations Due:      Topic Date Due   • COVID-19 Vaccine (1) Never done   • Pneumococcal Vaccine: Pediatrics (0 to 5 Years) and At-Risk Patients (6 to 59 Years) (1 - PCV) Never done   • Influenza Vaccine (Season Ended) 09/01/2023     Advance Directives   What are advance directives? Advance directives are legal documents that state your wishes and plans for medical care  These plans are made ahead of time in case you lose your ability to make decisions for yourself  Advance directives can apply to any medical decision, such as the treatments you want, and if you want to donate organs  What are the types of advance directives? There are many types of advance directives, and each state has rules about how to use them  You may choose a combination of any of the following:  · Living will: This is a written record of the treatment you want   You can also choose which treatments you do not want, which to limit, and which to stop at a certain time  This includes surgery, medicine, IV fluid, and tube feedings  · Durable power of  for healthcare [facility]): This is a written record that states who you want to make healthcare choices for you when you are unable to make them for yourself  This person, called a proxy, is usually a family member or a friend  You may choose more than 1 proxy  · Do not resuscitate (DNR) order:  A DNR order is used in case your heart stops beating or you stop breathing  It is a request not to have certain forms of treatment, such as CPR  A DNR order may be included in other types of advance directives  · Medical directive: This covers the care that you want if you are in a coma, near death, or unable to make decisions for yourself  You can list the treatments you want for each condition  Treatment may include pain medicine, surgery, blood transfusions, dialysis, IV or tube feedings, and a ventilator (breathing machine)  · Values history: This document has questions about your views, beliefs, and how you feel and think about life  This information can help others choose the care that you would choose  Why are advance directives important? An advance directive helps you control your care  Although spoken wishes may be used, it is better to have your wishes written down  Spoken wishes can be misunderstood, or not followed  Treatments may be given even if you do not want them  An advance directive may make it easier for your family to make difficult choices about your care  Cigarette Smoking and Your Health   Risks to your health if you smoke:  Nicotine and other chemicals found in tobacco damage every cell in your body  Even if you are a light smoker, you have an increased risk for cancer, heart disease, and lung disease  If you are pregnant or have diabetes, smoking increases your risk for complications     Benefits to your health if you stop smoking:   · You decrease respiratory symptoms such as coughing, wheezing, and shortness of breath  · You reduce your risk for cancers of the lung, mouth, throat, kidney, bladder, pancreas, stomach, and cervix  If you already have cancer, you increase the benefits of chemotherapy  You also reduce your risk for cancer returning or a second cancer from developing  · You reduce your risk for heart disease, blood clots, heart attack, and stroke  · You reduce your risk for lung infections, and diseases such as pneumonia, asthma, chronic bronchitis, and emphysema  · Your circulation improves  More oxygen can be delivered to your body  If you have diabetes, you lower your risk for complications, such as kidney, artery, and eye diseases  You also lower your risk for nerve damage  Nerve damage can lead to amputations, poor vision, and blindness  · You improve your body's ability to heal and to fight infections  For more information and support to stop smoking:   · [employer]  Phone: 7- 232 - [phone]  Web Address: [employer]  Narcotic (Opioid) Safety    Use narcotics safely:  · Take prescribed narcotics exactly as directed  · Do not give narcotics to others or take narcotics that belong to someone else  · Do not mix narcotics without medicines or alcohol  · Do not drive or operate heavy machinery after you take the narcotic  · Monitor for side effects and notify your healthcare provider if you experienced side effects such as nausea, sleepiness, itching, or trouble thinking clearly  Manage constipation:    Constipation is the most common side effect of narcotic medicine  Constipation is when you have hard, dry bowel movements, or you go longer than usual between bowel movements  Tell your healthcare provider about all changes in your bowel movements while you are taking narcotics  He or she may recommend laxative medicine to help you have a bowel movement   He or she may also change the kind of narcotic you are taking, or change when you take it  The following are more ways you can prevent or relieve constipation:    · Drink liquids as directed  You may need to drink extra liquids to help soften and move your bowels  Ask how much liquid to drink each day and which liquids are best for you  · Eat high-fiber foods  This may help decrease constipation by adding bulk to your bowel movements  High-fiber foods include fruits, vegetables, whole-grain breads and cereals, and beans  Your healthcare provider or dietitian can help you create a high-fiber meal plan  Your provider may also recommend a fiber supplement if you cannot get enough fiber from food  · Exercise regularly  Regular physical activity can help stimulate your intestines  Walking is a good exercise to prevent or relieve constipation  Ask which exercises are best for you  · Schedule a time each day to have a bowel movement  This may help train your body to have regular bowel movements  Bend forward while you are on the toilet to help move the bowel movement out  Sit on the toilet for at least 10 minutes, even if you do not have a bowel movement  Store narcotics safely:   · Store narcotics where others cannot easily get them  Keep them in a locked cabinet or secure area  Do not  keep them in a purse or other bag you carry with you  A person may be looking for something else and find the narcotics  · Make sure narcotics are stored out of the reach of children  A child can easily overdose on narcotics  Narcotics may look like candy to a small child  The best way to dispose of narcotics: The laws vary by country and area  In the [place], the best way is to return the narcotics through a take-back program  This program is offered by the [employer])  The following are options for using the program:  · Take the narcotics to a JENNIFER collection site  The site is often a law enforcement center   Call your local law enforcement center for scheduled take-back days in your area  You will be given information on where to go if the collection site is in a different location  · Take the narcotics to an approved pharmacy or hospital   A pharmacy or hospital may be set up as a collection site  You will need to ask if it is a JENNIFER collection site if you were not directed there  A pharmacy or doctor's office may not be able to take back narcotics unless it is a JENNIFER site  · Use a mail-back system  This means you are given containers to put the narcotics into  You will then mail them in the containers  · Use a take-back drop box  This is a place to leave the narcotics at any time  People and animals will not be able to get into the box  Your local law enforcement agency can tell you where to find a drop box in your area  Other ways to manage pain:   · Ask your healthcare provider about non-narcotic medicines to control pain  Nonprescription medicines include NSAIDs (such as ibuprofen) and acetaminophen  Prescription medicines include muscle relaxers, antidepressants, and steroids  · Pain may be managed without any medicines  Some ways to relieve pain include massage, aromatherapy, or meditation  Physical or occupational therapy may also help  For more information:   · Drug Enforcement Administration  [facility] [address]  Phone: 6- 559 - [phone]  Web Address: [facility]/drug_disposal/    · [address]  and Drug Administration  [facility]  [address] [facility]  Phone: 7- 697 - [phone]  Web Address: http://[employer]/     © Copyright [employer] 2018 Information is for End User's use only and may not be sold, redistributed or otherwise used for commercial purposes   All illustrations and images included in CareNotes® are the copyrighted property of [employer]  or [address] [place] [address] [employer]

## 2023-08-15 ENCOUNTER — HOSPITAL ENCOUNTER (OUTPATIENT)
Dept: CT IMAGING | Facility: HOSPITAL | Age: 28
Discharge: HOME | End: 2023-08-15
Payer: MEDICARE

## 2023-08-15 ENCOUNTER — OFFICE VISIT (OUTPATIENT)
Dept: FAMILY MEDICINE CLINIC | Facility: CLINIC | Age: 28
End: 2023-08-15
Payer: MEDICARE

## 2023-08-15 VITALS
WEIGHT: 153 LBS | DIASTOLIC BLOOD PRESSURE: 82 MMHG | SYSTOLIC BLOOD PRESSURE: 120 MMHG | HEART RATE: 80 BPM | OXYGEN SATURATION: 98 % | RESPIRATION RATE: 16 BRPM | HEIGHT: 72 IN | BODY MASS INDEX: 20.72 KG/M2 | TEMPERATURE: 98.1 F

## 2023-08-15 DIAGNOSIS — R51.9 ACUTE NONINTRACTABLE HEADACHE, UNSPECIFIED HEADACHE TYPE: ICD-10-CM

## 2023-08-15 DIAGNOSIS — R51.9 ACUTE NONINTRACTABLE HEADACHE, UNSPECIFIED HEADACHE TYPE: Primary | ICD-10-CM

## 2023-08-15 PROCEDURE — G1004 CDSM NDSC: HCPCS

## 2023-08-15 PROCEDURE — 99213 OFFICE O/P EST LOW 20 MIN: CPT

## 2023-08-15 PROCEDURE — 70450 CT HEAD/BRAIN W/O DYE: CPT

## 2023-08-15 NOTE — ASSESSMENT & PLAN NOTE
Pt reports intermittent sharp temporal headache with recent balance issues, drooping eyelid and slurring of speech. Pt not currently having episodes however states brain feels "foggy". No facial droop or slurring speech on exam, stable BP. Pt has some mild intellectual disability,  hx of schizophrenia with psychosis not currently on medication and refuses psychiatry referral as states symptoms are controlled. Will obtain CT scan, pt has neurology referral for further f/u pending results of CT scan, advised to seek ER care for worsening or returning sxs.

## 2023-08-15 NOTE — PROGRESS NOTES
Name: Frankey Hasting      : 1995      MRN: [id number]  Encounter Provider: SAUD High  Encounter Date: 8/15/2023   Encounter department: [facility] [address] MEDICINE    Assessment & Plan     1. Acute nonintractable headache, unspecified headache type  Assessment & Plan:  Pt reports intermittent sharp temporal headache with recent balance issues, drooping eyelid and slurring of speech. Pt not currently having episodes however states brain feels "foggy". No facial droop or slurring speech on exam, stable BP. Pt has some mild intellectual disability,  hx of schizophrenia with psychosis not currently on medication and refuses psychiatry referral as states symptoms are controlled. Will obtain CT scan, pt has neurology referral for further f/u pending results of CT scan, advised to seek ER care for worsening or returning sxs. Orders:  -     CT head wo contrast; Future; Expected date: 08/15/2023         Subjective      Pt presents with  for check up states that he loss his job recently due to balance issues. Pt reports intermittent sharp temporal pain, drooping eyelid, mild confusion with slurring. BP stable in office currently not experiencing symptoms, will obtain CT scan and advise f/u with prior neurology referral.    Review of Systems   Constitutional: Negative for activity change, appetite change, chills, diaphoresis, fatigue and fever. HENT: Negative for ear pain, facial swelling, hearing loss, trouble swallowing and voice change. Respiratory: Negative for cough, chest tightness and shortness of breath. Cardiovascular: Negative for chest pain and palpitations. Musculoskeletal: Negative for arthralgias, gait problem, myalgias, neck pain and neck stiffness. Skin: Negative for color change. Allergic/Immunologic: Negative for environmental allergies. Neurological: Positive for tremors and headaches.  Negative for dizziness, seizures, syncope, facial asymmetry, speech difficulty, weakness, light-headedness and numbness. Hematological: Negative for adenopathy. Psychiatric/Behavioral: Positive for sleep disturbance. Negative for agitation, behavioral problems, confusion, decreased concentration, dysphoric mood, hallucinations, self-injury and suicidal ideas. The patient is not nervous/anxious and is not hyperactive. Current Outpatient Medications on File Prior to Visit   Medication Sig   • ELDERBERRY PO Take by mouth daily  (Patient not taking: Reported on 3/8/2022)   • hydrocortisone 2.5 % ointment Apply topically 2 (two) times a day (Patient not taking: Reported on 6/21/2023)   • hydrocortisone 2.5 % ointment Apply topically 2 (two) times a day (Patient not taking: Reported on 8/15/2023)   • ibuprofen (MOTRIN) 400 mg tablet Take 1.5 tablets (600 mg total) by mouth every 6 (six) hours as needed for moderate pain (Patient not taking: Reported on 8/15/2023)   • neomycin-bacitracin-polymyxin (NEOSPORIN) 5-400-5,000 ointment Apply topically 4 (four) times a day (Patient not taking: Reported on 6/21/2023)   • Skin Protectants, Misc. (eucerin) cream Apply topically as needed for wound care (Patient not taking: Reported on 6/21/2023)   • triamcinolone (KENALOG) 0.5 % cream Apply topically 2 (two) times a day (Patient not taking: Reported on 8/15/2023)       Objective     /82 (BP Location: Left arm, Patient Position: Sitting, Cuff Size: Standard)   Pulse 80   Temp 98.1 °F (36.7 °C) (Tympanic)   Resp 16   Ht 6' (1.829 m)   Wt 69.4 kg (153 lb)   SpO2 98%   BMI 20.75 kg/m²     Physical Exam  Vitals and nursing note reviewed. Constitutional:       General: He is not in acute distress. Appearance: Normal appearance. He is normal weight. He is not ill-appearing, toxic-appearing or diaphoretic. HENT:      Head: Normocephalic and atraumatic. Right Ear: Tympanic membrane, ear canal and external ear normal. There is no impacted cerumen. Left Ear: Tympanic membrane, ear canal and external ear normal. There is no impacted cerumen. Mouth/Throat:      Mouth: Mucous membranes are moist.   Eyes:      General: No visual field deficit or scleral icterus. Right eye: No discharge. Left eye: No discharge. Extraocular Movements: Extraocular movements intact. Conjunctiva/sclera: Conjunctivae normal.      Pupils: Pupils are equal, round, and reactive to light. Cardiovascular:      Rate and Rhythm: Normal rate and regular rhythm. Pulses: Normal pulses. Heart sounds: Normal heart sounds. Pulmonary:      Effort: Pulmonary effort is normal. No respiratory distress. Abdominal:      General: Bowel sounds are normal.   Musculoskeletal:         General: Normal range of motion. Cervical back: Normal range of motion. Skin:     General: Skin is warm. Capillary Refill: Capillary refill takes less than 2 seconds. Findings: No bruising or erythema. Neurological:      Mental Status: He is alert and oriented to person, place, and time. Cranial Nerves: No cranial nerve deficit, dysarthria or facial asymmetry. Sensory: Sensation is intact. No sensory deficit. Motor: Motor function is intact. No weakness, tremor, atrophy, abnormal muscle tone, seizure activity or pronator drift. Coordination: Coordination is intact. Coordination normal.      Gait: Gait normal.      Deep Tendon Reflexes: Reflexes are normal and symmetric. Reflexes normal.   Psychiatric:         Attention and Perception: Attention and perception normal.         Mood and Affect: Mood and affect normal. Affect is not inappropriate. Speech: Speech normal. Speech is not rapid and pressured, slurred or tangential.         Behavior: Behavior normal. Behavior is not agitated, slowed or aggressive. Behavior is cooperative. Thought Content:  Thought content normal. Thought content is not paranoid or delusional. Thought content does not include homicidal or suicidal ideation. Thought content does not include homicidal or suicidal plan. Cognition and Memory: Cognition normal.         Judgment: Judgment normal. Judgment is not inappropriate.       Comments: Mild Intellectual disability,        [address]

## 2023-09-12 ENCOUNTER — TELEPHONE (OUTPATIENT)
Age: 28
End: 2023-09-12

## 2023-09-12 NOTE — TELEPHONE ENCOUNTER
Patient girlfriend Max Daft is requesting a new neurologist referral.   [phone]   Please advise

## 2024-02-21 PROBLEM — Z00.00 MEDICARE ANNUAL WELLNESS VISIT, SUBSEQUENT: Status: RESOLVED | Noted: 2018-12-03 | Resolved: 2024-02-21

## 2024-04-22 ENCOUNTER — TELEPHONE (OUTPATIENT)
Dept: NEUROLOGY | Facility: CLINIC | Age: 29
End: 2024-04-22

## 2024-04-22 NOTE — TELEPHONE ENCOUNTER
Sri  for disability, dr navarro. Etc called is aware of appt on 7/10 at 7am. Ask if pt had any previous visits, advised not previous visits, appt will be first consultation with us.    Sri ask who pt was referred by. As I spelled the first name of the provider she stated the last name of the provider. Sri will contact that provider.

## 2024-08-08 ENCOUNTER — TELEPHONE (OUTPATIENT)
Age: 29
End: 2024-08-08

## 2024-08-08 ENCOUNTER — OFFICE VISIT (OUTPATIENT)
Dept: NEUROLOGY | Facility: CLINIC | Age: 29
End: 2024-08-08
Payer: MEDICARE

## 2024-08-08 VITALS — WEIGHT: 158.2 LBS | DIASTOLIC BLOOD PRESSURE: 74 MMHG | BODY MASS INDEX: 21.46 KG/M2 | SYSTOLIC BLOOD PRESSURE: 138 MMHG

## 2024-08-08 DIAGNOSIS — F32.A DEPRESSION: ICD-10-CM

## 2024-08-08 DIAGNOSIS — R20.2 PARESTHESIA: Primary | ICD-10-CM

## 2024-08-08 DIAGNOSIS — R51.9 HEADACHE: ICD-10-CM

## 2024-08-08 DIAGNOSIS — F95.2 TOURETTE'S SYNDROME: ICD-10-CM

## 2024-08-08 PROBLEM — R25.1 TREMORS OF NERVOUS SYSTEM: Status: RESOLVED | Noted: 2023-06-21 | Resolved: 2024-08-08

## 2024-08-08 PROCEDURE — 99204 OFFICE O/P NEW MOD 45 MIN: CPT | Performed by: PSYCHIATRY & NEUROLOGY

## 2024-08-08 NOTE — ASSESSMENT & PLAN NOTE
Patient currently with episodes of depression and anxiety. Previously may have carried a diagnosis of schizophrenia. Denies current psychosis.     Will refer to psychiatry for evaluation of current mental status and clarification of diagnosis to determine is medications are recommended.

## 2024-08-08 NOTE — TELEPHONE ENCOUNTER
Contacted the patient regarding a routine referral to verify service needs and inform pt of the current waitlist. Spoke with pt interested in medication mgmt, writer informed pt of wait list pt verbalized understanding. Writer placed pt on wait list and closed referral.

## 2024-08-08 NOTE — PROGRESS NOTES
Patient ID: Hayden Campbell is a 29 y.o. male.    Assessment/Plan:    Tourette's syndrome  Unclear diagnosis. No clear motor tics notes in office. Not on medication for Tourette's syndrome.       Depression  Patient currently with episodes of depression and anxiety. Previously may have carried a diagnosis of schizophrenia. Denies current psychosis.     Will refer to psychiatry for evaluation of current mental status and clarification of diagnosis to determine is medications are recommended.     Headache  Patient is a 29 year old man with history of psychosis as chile, possibly diagnosed with schizophrenia in the past, possible diagnosed with Tourette;s in the past, who presents with concerns regarding left sided (head, arm leg paresthesia), episodic left leg stiffness and posturing and headaches with abnormal temple paresthesia.     Will obtain further workup looking for underlying neurological cause for headaches, paresthesia and posturing. MRI brain and labs ordered.        Diagnoses and all orders for this visit:    Paresthesia  -     MRI brain with and without contrast; Future  -     CBC and differential; Future  -     Comprehensive metabolic panel; Future  -     COPPER, URINE, 24 HOUR; Future  -     Ceruloplasmin; Future  -     Vitamin B12/Folate, Serum Panel; Future  -     TSH, 3rd generation with Free T4 reflex; Future  -     CK; Future    Headache  -     MRI brain with and without contrast; Future    Depression  -     Ambulatory referral to Psych Services; Future       Subjective:    Hayden Dodson is a 29 years with HTN and possible Tourette's syndrome.     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 Staff comes to home twice weekly to maintain the home.   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   Mother may have had bipolar disorder. Father had depression.    In 2018, he was noted to have  "imbalance.  In 2020 he was noted to have left hand finger twitching and hand tremor. In 2021, he developed leg shaking. In 2023, he develop foot cramping and toe extension along with intermittent left leg stiffness. Symptoms have persisted since.   He has imbalance. He tends to stumble on words and has noted word finding difficulty over the past 6 years.   Nightmares present since 2021-22 about 2-4 times weekly. He smokes marijuana to sleep.     Reports tingling of the head bilaterally in both temples. Also develops headaches almost daily over the past year.    Two days out of the week he will have trouble swallowing.      He reports bouts of anxiety and of sadness. He does not believe his previous schizophrenia was correct. When asked about Tourette's syndrome he also states he does not believe this diagnosis was correct.   He recalls having hallucinations at 7-8 years old. He was medicated at the time. He reports being \"in the system\" for his childhood and signing himself out at age 17. He was on various medications. He took himself off medications as has not been on an treatment over the past  12 years. He was last seen a psychiatrist at age [age].      Drinks alcohol about 4 times weekly (1-2 drinks). Uses marijuana for sleep he obtains from [employer].          Objective:    Blood pressure 138/74, weight 71.8 kg (158 lb 3.2 oz).    Physical Exam  Vitals reviewed.   Eyes:      Extraocular Movements: Extraocular movements intact.      Pupils: Pupils are equal, round, and reactive to light.   Neurological:      Motor: Motor strength is normal.     Deep Tendon Reflexes:      Reflex Scores:       Tricep reflexes are 2+ on the right side and 2+ on the left side.       Bicep reflexes are 2+ on the right side and 2+ on the left side.       Patellar reflexes are 2+ on the right side and 2+ on the left side.       Achilles reflexes are 2+ on the right side and 2+ on the left side.        Neurological Exam  Mental " Status   Oriented only to person, place and situation. Speech: Stumble/ stutters when speaking.  . Follows complex commands. Attention and concentration are normal.  Poor eye contact   Tends to lean forward on elbow. Restless truncal movement.    Cranial Nerves  CN III, IV, VI: Extraocular movements intact bilaterally. Pupils equal round and reactive to light bilaterally.  CN V:  Left: Diminished sensation of the entire left side of the face.  CN VII: Full and symmetric facial movement.  CN VIII: Hearing is normal.  CN XI: Shoulder shrug strength is normal.  CN XII: Tongue midline without atrophy or fasciculations.    Motor   Normal muscle tone. Tends to sway in chair. Tends to lean forward. .   Strength is 5/5 throughout all four extremities.    Sensory  Light touch is normal in upper and lower extremities. Diminished in nondermatomal pattern in the entire left  upper and lower extremity . Reduced at finger tips and toes bilaterally.     Reflexes                                            Right                      Left  Biceps                                 2+                         2+  Triceps                                2+                         2+  Patellar                                2+                         2+  Achilles                                2+                         2+  Right Plantar: downgoing  Left Plantar: downgoing    Right pathological reflexes: Ankle clonus absent.  Left pathological reflexes: Ankle clonus absent.    Coordination  Right: Finger-to-nose normal. Rapid alternating movement normal.Left: Finger-to-nose normal. Rapid alternating movement normal.    Gait    Wide based gait. Mild ataxia .   Imbalance on turn .        ROS:    Review of Systems   Constitutional: Negative.  Negative for appetite change, fatigue and fever.   HENT:  Positive for trouble swallowing (At times things get stuck). Negative for hearing loss, tinnitus and voice change.    Eyes: Negative.  Negative for  photophobia, pain and visual disturbance.   Respiratory: Negative.  Negative for shortness of breath.    Cardiovascular: Negative.  Negative for palpitations.   Gastrointestinal: Negative.  Negative for nausea and vomiting.   Endocrine: Negative.  Negative for cold intolerance.   Genitourinary: Negative.  Negative for dysuria, frequency and urgency.   Musculoskeletal:  Positive for gait problem (Stumbles at times) and myalgias (Feet cramp up and get painful and Toes cramp up and get stuck, they tend to bend upwards and stay that way for a few min). Negative for back pain, neck pain and neck stiffness.        Balance Issues  Body Stiffness  Fingers Twitch     Skin: Negative.  Negative for rash.   Allergic/Immunologic: Negative.    Neurological:  Positive for tremors (Legs and Hands), weakness (At times) and numbness (Left Arm and Left Leg, also states at times in face). Negative for dizziness, seizures, syncope, facial asymmetry, speech difficulty, light-headedness and headaches.   Hematological:  Bruises/bleeds easily.   Psychiatric/Behavioral:  Positive for sleep disturbance. Negative for confusion and hallucinations.    All other systems reviewed and are negative.

## 2024-08-08 NOTE — ASSESSMENT & PLAN NOTE
Patient is a 29 year old man with history of psychosis as chile, possibly diagnosed with schizophrenia in the past, possible diagnosed with Tourette;s in the past, who presents with concerns regarding left sided (head, arm leg paresthesia), episodic left leg stiffness and posturing and headaches with abnormal temple paresthesia.     Will obtain further workup looking for underlying neurological cause for headaches, paresthesia and posturing. MRI brain and labs ordered.

## 2024-08-08 NOTE — ASSESSMENT & PLAN NOTE
Unclear diagnosis. No clear motor tics notes in office. Not on medication for Tourette's syndrome.

## 2024-08-08 NOTE — PATIENT INSTRUCTIONS
Patient is a 29 year old man with history of psychosis as chile, possibly diagnosed with schizophrenia in the past, possible diagnosed with Tourette;s in the past, who presents with concerns regarding left sided (head, arm leg paresthesia), episodic left leg stiffness and posturing and headaches with abnormal temple paresthesia.     Will obtain further workup looking for underlying neurological cause for headaches, paresthesia and posturing. MRI brain and labs ordered.     Will refer to psychiatry for evaluation of current mental status and clarification of diagnosis to determine is medications are recommended.     Will meet after results of MRI available to review.

## 2024-08-20 ENCOUNTER — TELEPHONE (OUTPATIENT)
Dept: INTERNAL MEDICINE CLINIC | Facility: CLINIC | Age: 29
End: 2024-08-20

## 2024-08-23 ENCOUNTER — HOSPITAL ENCOUNTER (OUTPATIENT)
Dept: MRI IMAGING | Facility: HOSPITAL | Age: 29
End: 2024-08-23
Payer: MEDICARE

## 2024-08-23 DIAGNOSIS — R51.9 HEADACHE: ICD-10-CM

## 2024-08-23 DIAGNOSIS — R20.2 PARESTHESIA: ICD-10-CM

## 2024-08-23 PROCEDURE — 70551 MRI BRAIN STEM W/O DYE: CPT

## 2024-08-30 ENCOUNTER — APPOINTMENT (OUTPATIENT)
Dept: LAB | Facility: HOSPITAL | Age: 29
End: 2024-08-30
Payer: MEDICARE

## 2024-08-30 DIAGNOSIS — R20.2 PARESTHESIA: ICD-10-CM

## 2024-08-30 LAB
ALBUMIN SERPL BCG-MCNC: 4.9 G/DL (ref 3.5–5)
ALP SERPL-CCNC: 41 U/L (ref 34–104)
ALT SERPL W P-5'-P-CCNC: 38 U/L (ref 7–52)
ANION GAP SERPL CALCULATED.3IONS-SCNC: 10 MMOL/L (ref 4–13)
AST SERPL W P-5'-P-CCNC: 39 U/L (ref 13–39)
BASOPHILS # BLD AUTO: 0.05 THOUSANDS/ÂΜL (ref 0–0.1)
BASOPHILS NFR BLD AUTO: 1 % (ref 0–1)
BILIRUB SERPL-MCNC: 1.34 MG/DL (ref 0.2–1)
BUN SERPL-MCNC: 11 MG/DL (ref 5–25)
CALCIUM SERPL-MCNC: 9.8 MG/DL (ref 8.4–10.2)
CHLORIDE SERPL-SCNC: 102 MMOL/L (ref 96–108)
CK SERPL-CCNC: 160 U/L (ref 39–308)
CO2 SERPL-SCNC: 30 MMOL/L (ref 21–32)
CREAT SERPL-MCNC: 0.87 MG/DL (ref 0.6–1.3)
EOSINOPHIL # BLD AUTO: 0.24 THOUSAND/ÂΜL (ref 0–0.61)
EOSINOPHIL NFR BLD AUTO: 5 % (ref 0–6)
ERYTHROCYTE [DISTWIDTH] IN BLOOD BY AUTOMATED COUNT: 11.6 % (ref 11.6–15.1)
FOLATE SERPL-MCNC: 8.8 NG/ML
GFR SERPL CREATININE-BSD FRML MDRD: 115 ML/MIN/1.73SQ M
GLUCOSE P FAST SERPL-MCNC: 83 MG/DL (ref 65–99)
HCT VFR BLD AUTO: 45.1 % (ref 36.5–49.3)
HGB BLD-MCNC: 16.9 G/DL (ref 12–17)
IMM GRANULOCYTES # BLD AUTO: 0.01 THOUSAND/UL (ref 0–0.2)
IMM GRANULOCYTES NFR BLD AUTO: 0 % (ref 0–2)
LYMPHOCYTES # BLD AUTO: 1.95 THOUSANDS/ÂΜL (ref 0.6–4.47)
LYMPHOCYTES NFR BLD AUTO: 38 % (ref 14–44)
MCH RBC QN AUTO: 35.3 PG (ref 26.8–34.3)
MCHC RBC AUTO-ENTMCNC: 37.5 G/DL (ref 31.4–37.4)
MCV RBC AUTO: 94 FL (ref 82–98)
MONOCYTES # BLD AUTO: 0.48 THOUSAND/ÂΜL (ref 0.17–1.22)
MONOCYTES NFR BLD AUTO: 9 % (ref 4–12)
NEUTROPHILS # BLD AUTO: 2.37 THOUSANDS/ÂΜL (ref 1.85–7.62)
NEUTS SEG NFR BLD AUTO: 47 % (ref 43–75)
NRBC BLD AUTO-RTO: 0 /100 WBCS
PLATELET # BLD AUTO: 208 THOUSANDS/UL (ref 149–390)
PMV BLD AUTO: 9.7 FL (ref 8.9–12.7)
POTASSIUM SERPL-SCNC: 3.4 MMOL/L (ref 3.5–5.3)
PROT SERPL-MCNC: 7.4 G/DL (ref 6.4–8.4)
RBC # BLD AUTO: 4.79 MILLION/UL (ref 3.88–5.62)
SODIUM SERPL-SCNC: 142 MMOL/L (ref 135–147)
TSH SERPL DL<=0.05 MIU/L-ACNC: 1.93 UIU/ML (ref 0.45–4.5)
VIT B12 SERPL-MCNC: 205 PG/ML (ref 180–914)
WBC # BLD AUTO: 5.1 THOUSAND/UL (ref 4.31–10.16)

## 2024-08-30 PROCEDURE — 84443 ASSAY THYROID STIM HORMONE: CPT

## 2024-08-30 PROCEDURE — 82607 VITAMIN B-12: CPT

## 2024-08-30 PROCEDURE — 82390 ASSAY OF CERULOPLASMIN: CPT

## 2024-08-30 PROCEDURE — 82550 ASSAY OF CK (CPK): CPT

## 2024-08-30 PROCEDURE — 85025 COMPLETE CBC W/AUTO DIFF WBC: CPT

## 2024-08-30 PROCEDURE — 80053 COMPREHEN METABOLIC PANEL: CPT

## 2024-08-30 PROCEDURE — 82746 ASSAY OF FOLIC ACID SERUM: CPT

## 2024-08-30 PROCEDURE — 36415 COLL VENOUS BLD VENIPUNCTURE: CPT

## 2024-08-31 LAB — CERULOPLASMIN SERPL-MCNC: 22.4 MG/DL (ref 16–31)

## 2024-09-03 ENCOUNTER — APPOINTMENT (OUTPATIENT)
Dept: LAB | Facility: HOSPITAL | Age: 29
End: 2024-09-03
Payer: MEDICARE

## 2024-09-03 ENCOUNTER — TELEPHONE (OUTPATIENT)
Age: 29
End: 2024-09-03

## 2024-09-03 DIAGNOSIS — E53.8 B12 DEFICIENCY: Primary | ICD-10-CM

## 2024-09-03 PROCEDURE — 82570 ASSAY OF URINE CREATININE: CPT

## 2024-09-03 PROCEDURE — 82525 ASSAY OF COPPER: CPT

## 2024-09-03 NOTE — TELEPHONE ENCOUNTER
----- Message from Kika Kathleen MD sent at 9/3/2024  7:45 AM EDT -----  B12 is 209 , low normal. Would have him take B12 1000mcg daily for the next 3 months and will repeat level.

## 2024-09-03 NOTE — TELEPHONE ENCOUNTER
Spoke with pt and made him aware of the results and recommendations. Pt verbalized understanding of all. Routed to provider to place the order for the Vitamin B12 level that should be drawn in 3 months.

## 2024-09-06 ENCOUNTER — PATIENT MESSAGE (OUTPATIENT)
Dept: NEUROLOGY | Facility: CLINIC | Age: 29
End: 2024-09-06

## 2024-09-06 DIAGNOSIS — E53.8 B12 DEFICIENCY: Primary | ICD-10-CM

## 2024-09-06 LAB
COPPER 24H UR-MRATE: 15 UG/24 HR (ref 3–35)
COPPER UR-MCNC: 5 UG/L
COPPER/CREAT UR: 11 UG/G CREAT (ref 0–49)
CREAT UR-MCNC: 0.44 G/L (ref 0.3–3)

## 2024-09-09 RX ORDER — LANOLIN ALCOHOL/MO/W.PET/CERES
1000 CREAM (GRAM) TOPICAL DAILY
Qty: 30 TABLET | Refills: 2 | Status: SHIPPED | OUTPATIENT
Start: 2024-09-09

## 2024-10-15 ENCOUNTER — TELEMEDICINE (OUTPATIENT)
Dept: NEUROLOGY | Facility: CLINIC | Age: 29
End: 2024-10-15
Payer: MEDICARE

## 2024-10-15 ENCOUNTER — TELEPHONE (OUTPATIENT)
Dept: NEUROLOGY | Facility: CLINIC | Age: 29
End: 2024-10-15

## 2024-10-15 DIAGNOSIS — F20.9 SCHIZOPHRENIA, UNSPECIFIED TYPE (HCC): ICD-10-CM

## 2024-10-15 DIAGNOSIS — R26.0 ATAXIC GAIT: Primary | ICD-10-CM

## 2024-10-15 DIAGNOSIS — R13.10 DYSPHAGIA: ICD-10-CM

## 2024-10-15 DIAGNOSIS — R13.10 DYSPHAGIA, UNSPECIFIED TYPE: ICD-10-CM

## 2024-10-15 DIAGNOSIS — R20.2 PARESTHESIA: ICD-10-CM

## 2024-10-15 PROCEDURE — 99214 OFFICE O/P EST MOD 30 MIN: CPT | Performed by: PSYCHIATRY & NEUROLOGY

## 2024-10-15 RX ORDER — ALBUTEROL SULFATE 90 UG/1
2 INHALANT RESPIRATORY (INHALATION) EVERY 6 HOURS PRN
COMMUNITY
Start: 2024-10-08 | End: 2025-10-08

## 2024-10-15 NOTE — PROGRESS NOTES
Virtual Regular Visit  Name: Hayden Campbell      : 1995      MRN: [id number]  Encounter Provider: Kika Kathleen MD  Encounter Date: 10/15/2024   Encounter department: [facility] [place]    Verification of patient location:    Patient is located at Home in the following state in which I hold an active license [place]    Assessment & Plan  Ataxic gait  Patient reports of gait changes since 2018 with somewhat development of static paresthesias paresthesias in the left face, arm and leg along with episodes of cramping.  Recently developing dysphagia.   Orders:    Ambulatory Referral to Physical Therapy; Future    EMG 2 limb lower extremity; Future    Dysphagia, unspecified type    Orders:    EMG 2 limb lower extremity; Future    Paresthesia    Orders:    EMG 2 limb lower extremity; Future    Schizophrenia, unspecified type (HCC)    Orders:    Ambulatory referral to Psych Services; Future    Dysphagia  Reporting having choking episodes every 3 days or so.  Typically with liquids it sounds.  Will order video swallow evaluation.  Orders:    FL barium swallow video w speech; Future        Encounter provider Kika Kathleen MD    The patient was identified by name and date of birth. Hayden Campbell was informed that this is a telemedicine visit and that the visit is being conducted through the Epic Embedded platform. He agrees to proceed..  My office door was closed. No one else was in the room.  He acknowledged consent and understanding of privacy and security of the video platform. The patient has agreed to participate and understands they can discontinue the visit at any time.    Patient is aware this is a billable service.     History of Present Illness     Hayden Campbell is a 29 y.o. male who presents  for follow-up.   To review he has had imbalance since .  In  he was noted to have a left hand tremor.  In  he developed left leg shaking.  In  he developed left  "foot cramping and toe extension along with intermittent leg stiffness.  Also reported headaches and a tendency to stumble words.  Initially seen August 2024.  Workup ordered and he was referred to psychiatry to clarify diagnosis and to treat.    Reports tremors in his hands and legs have gotten worse.  Nothing brings out tremors.  He tends to shuffle at times.  He continues to have episodic numbness radiating down the left leg and arm.  He reports periods of cramping of the face.     Reports having excessive saliva.  Occasional drooling.    Dysphagia on occasion with liquids. Cough and aspirates on liquids. Occurs a few times a weeks.    MRI of the brain was normal but this was limited by motion.    Labs were unremarkable except for a low normal B12.  Supplementation recommended.      More history:  here was a question of schizophrenia diagnoses and Tourette's diagnosis in the past.  He has bouts of anxiety and sadness. He recalls having hallucinations at 7-8 years old. He was medicated at the time. He reports being \"in the system\" for his childhood and signing himself out at age 17. He was on various medications. He took himself off medications as has not been on an treatment over the past  12 years. He was last seen a psychiatrist at age [age].      Drinks alcohol about 4 times weekly (1-2 drinks). Uses marijuana for sleep he obtains from [employer].   Lives in an apartment with roommate and common area. Staff comes to home twice weekly to maintain the home. 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   Mother may have had bipolar disorder. Father had depression.            Objective:     Blood pressure 138/74, weight 71.8 kg (158 lb 3.2 oz).     Physical Exam      Review of Systems   Constitutional:  Positive for fatigue. Negative for appetite change and fever.   HENT:  Positive for trouble swallowing (at times). Negative for hearing loss, tinnitus and voice change.    Eyes: Negative.  Negative for photophobia, pain and visual " disturbance.   Respiratory: Negative.  Negative for shortness of breath.    Cardiovascular: Negative.  Negative for palpitations.   Gastrointestinal: Negative.  Negative for nausea and vomiting.   Endocrine: Negative.  Negative for cold intolerance.   Genitourinary: Negative.  Negative for dysuria, frequency and urgency.   Musculoskeletal:  Positive for gait problem (Shuffles Feet at times), myalgias and neck pain. Negative for back pain and neck stiffness.        Balance Issues , has stayed about the same   Skin: Negative.  Negative for rash.   Allergic/Immunologic: Negative.    Neurological:  Positive for tremors (Arms and into Hands and Legs also, has gotten worse), speech difficulty, weakness (At times feels weak) and numbness (At times in Hands and Feet). Negative for dizziness, seizures, syncope, facial asymmetry, light-headedness and headaches.   Hematological:  Bruises/bleeds easily.   Psychiatric/Behavioral:  Positive for sleep disturbance. Negative for confusion and hallucinations.    All other systems reviewed and are negative.          Objective     There were no vitals taken for this visit.  Physical Exam  Vitals: unable to obtain   Mental status: Patient is awake, alert and oriented  Follows commands.  Difficulty organizing thoughts but able to communicate with some hesitations.  .  Follows commands.    Cranial Nerves:   CN VII- symmetric facial movements (smile, eye closure )   CN VIII- normal to voice   CN IX- symmetric shoulder shrug   Motor: no pronator drift,  No intentional tremor on nose to extended arm bilaterally. Normal handgrip and finger taps.  No bradykinesia no tremors.  Gait: Wide based, ataxic, Left leg   Visit Time  Total Visit Duration: 30min

## 2024-10-15 NOTE — ASSESSMENT & PLAN NOTE
Reporting having choking episodes every 3 days or so.  Typically with liquids it sounds.  Will order video swallow evaluation.  Orders:    FL barium swallow video w speech; Future

## 2024-10-30 ENCOUNTER — TELEPHONE (OUTPATIENT)
Age: 29
End: 2024-10-30

## 2024-10-30 NOTE — TELEPHONE ENCOUNTER
Hayden called because he thought someone from [facility] called him to set up an appt for MM. Writer informed that there are no messages in file that someone called. He didn't know if it was Intake or if it was another office. Writer informed that an encounter would be sent to [facility] so that they are aware he returned a call.

## 2024-11-04 ENCOUNTER — TELEPHONE (OUTPATIENT)
Age: 29
End: 2024-11-04

## 2025-01-17 ENCOUNTER — HOSPITAL ENCOUNTER (EMERGENCY)
Facility: HOSPITAL | Age: 30
Discharge: HOME | End: 2025-01-18
Attending: EMERGENCY MEDICINE
Payer: MEDICARE

## 2025-01-17 ENCOUNTER — APPOINTMENT (EMERGENCY)
Dept: CT IMAGING | Facility: HOSPITAL | Age: 30
End: 2025-01-17
Payer: MEDICARE

## 2025-01-17 DIAGNOSIS — F10.929 ALCOHOL INTOXICATION (HCC): ICD-10-CM

## 2025-01-17 DIAGNOSIS — S09.90XA INJURY OF HEAD, INITIAL ENCOUNTER: ICD-10-CM
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
## 2025-01-17 PROCEDURE — 70450 CT HEAD/BRAIN W/O DYE: CPT

## 2025-01-17 PROCEDURE — 72125 CT NECK SPINE W/O DYE: CPT

## 2025-01-17 PROCEDURE — 90471 IMMUNIZATION ADMIN: CPT

## 2025-01-17 PROCEDURE — 90715 TDAP VACCINE 7 YRS/> IM: CPT | Performed by: EMERGENCY MEDICINE

## 2025-01-17 PROCEDURE — 99284 EMERGENCY DEPT VISIT MOD MDM: CPT

## 2025-01-17 PROCEDURE — 99284 EMERGENCY DEPT VISIT MOD MDM: CPT | Performed by: EMERGENCY MEDICINE

## 2025-01-17 RX ADMIN — TETANUS TOXOID, REDUCED DIPHTHERIA TOXOID AND ACELLULAR PERTUSSIS VACCINE, ADSORBED 0.5 ML: 5; 2.5; 8; 8; 2.5 SUSPENSION INTRAMUSCULAR at 23:33

## 2025-01-18 VITALS
TEMPERATURE: 97.5 F | WEIGHT: 149.47 LBS | SYSTOLIC BLOOD PRESSURE: 117 MMHG | DIASTOLIC BLOOD PRESSURE: 60 MMHG | HEART RATE: 80 BPM | OXYGEN SATURATION: 95 % | RESPIRATION RATE: 18 BRPM | BODY MASS INDEX: 20.27 KG/M2

## 2025-01-18 NOTE — ED PROVIDER NOTES
Time reflects when diagnosis was documented in both MDM as applicable and the Disposition within this note       Time User Action Codes Description Comment    1/18/2025  4:37 AM Himanshu Lewis [W19.XXXA] Fall, initial encounter     1/18/2025  4:37 AM Himanshu Lewis [S09.90XA] Injury of head, initial encounter     1/18/2025  4:37 AM Himanshu Lewis [F10.929] Alcohol intoxication (HCC)           ED Disposition       ED Disposition   Discharge    Condition   Stable    Date/Time   Sat Jan 18, 2025  4:37 AM    Comment   Hayden Campbell discharge to home/self care.                   Assessment & Plan       Medical Decision Making  29-year-old gentleman presents after a flight of stairs.  He reports that he has Parkinson's and had around 3 shots of tequila.  He reports that he lost his balance and fell.  There was no loss of consciousness although he did strike the top of his head causing an abrasion.  He has noted to have multiple appointments across the extensor surfaces of his right hand.  He denies any acute trauma and states that it is the result of dry skin.  Denies having any pain or new focal neurological deficits.    Amount and/or Complexity of Data Reviewed  Radiology: ordered.    Risk  Prescription drug management.        ED Course as of 01/18/25 0438   Sat Jan 18, 2025   0437 CT scans are unremarkable for any acute traumatic injuries.  Plan discharge home with outpatient follow-up.       Medications   tetanus-diphtheria-acellular pertussis (BOOSTRIX) IM injection 0.5 mL (0.5 mL Intramuscular Given 1/17/25 2383)       ED Risk Strat Scores                          SBIRT 22yo+      Flowsheet Row Most Recent Value   Initial Alcohol Screen: US AUDIT-C     1. How often do you have a drink containing alcohol? 0 Filed at: 01/18/2025 0003   2. How many drinks containing alcohol do you have on a typical day you are drinking?  0 Filed at: 01/18/2025 0003   3a. Male UNDER 65: How often do you have five or more  drinks on one occasion? 0 Filed at: 01/18/2025 0003   3b. FEMALE Any Age, or MALE 65+: How often do you have 4 or more drinks on one occassion? 0 Filed at: 01/18/2025 0003   Audit-C Score 0 Filed at: 01/18/2025 0003   KECIA: How many times in the past year have you...    Used an illegal drug or used a prescription medication for non-medical reasons? Never Filed at: 01/18/2025 0003                            History of Present Illness       Chief Complaint   Patient presents with    Fall     Pt was drinking when care giver noticed patient fall down the steps. Pt denies LOC/blood thinners, Has scrape on top of the head.        Past Medical History:   Diagnosis Date    Anxiety     Last assessed 10/18/16    Asthma     Last assessed 03/15/13    Closed fracture of nasal bone 04/24/2018    Disorder of penis 08/28/2018    Intermittent explosive disorder     Oppositional defiant disorder     Palpitation 02/13/2019    Schizophrenia (HCC)     last assessed 12/04/17    Scoliosis     Tourette's syndrome 06/20/2013      Past Surgical History:   Procedure Laterality Date    HERNIA REPAIR      NO PAST SURGERIES      AK LAPAROSCOPIC APPENDECTOMY N/A 1/9/2019    Procedure: APPENDECTOMY LAPAROSCOPIC;  Surgeon: Celine Aguirre MD;  Location: [facility];  Service: General    AK LAPS ABD PRTM&OMENTUM DX W/WO SPEC BR/WA SPX N/A 1/9/2019    Procedure: LAPAROSCOPY DIAGNOSTIC;  Surgeon: Celine Aguirre MD;  Location: [facility];  Service: General      Family History   Problem Relation Age of Onset    Diabetes Mother     Bipolar disorder Family     Schizophrenia Family     Diabetes type II Family       Social History     Tobacco Use    Smoking status: Every Day     Current packs/day: 0.50     Average packs/day: 0.5 packs/day for 4.0 years (2.0 ttl pk-yrs)     Types: Cigarettes    Smokeless tobacco: Never   Vaping Use    Vaping status: Never Used   Substance Use Topics    Alcohol use: Yes     Comment: beer, wine, whiskey    Drug use: Not  Currently     Comment: previous Marijuana use       E-Cigarette/Vaping    E-Cigarette Use Never User       E-Cigarette/Vaping Substances      I have reviewed and agree with the history as documented.       Fall  Mechanism of injury: fall    Injury location:  Head/neck  Incident location:  Home  Time since incident: minutes.  Arrived directly from scene: yes    Fall:     Fall occurred:  Down stairs  Suspicion of alcohol use: yes    Tetanus status:  Out of date  Prior to arrival data:     Loss of consciousness: no      Amnesic to event: no    Associated symptoms: no abdominal pain, no back pain, no chest pain, no difficulty breathing, no headaches, no hearing loss, no loss of consciousness, no nausea, no neck pain, no seizures and no vomiting        Review of Systems   HENT:  Negative for hearing loss.    Cardiovascular:  Negative for chest pain.   Gastrointestinal:  Negative for abdominal pain, nausea and vomiting.   Musculoskeletal:  Negative for back pain and neck pain.   Neurological:  Negative for seizures, loss of consciousness and headaches.   All other systems reviewed and are negative.          Objective       ED Triage Vitals [01/17/25 2322]   Temperature Pulse Blood Pressure Respirations SpO2 Patient Position - Orthostatic VS   97.5 °F (36.4 °C) 84 (!) 177/119 18 98 % Lying      Temp Source Heart Rate Source BP Location FiO2 (%) Pain Score    Oral Monitor Left arm -- --      Vitals      Date and Time Temp Pulse SpO2 Resp BP Pain Score FACES Pain Rating User   01/18/25 0300 -- 80 95 % 18 117/60 -- -- [facility]   01/18/25 0230 -- 72 95 % 18 110/59 -- -- [facility]   01/18/25 0100 -- 76 95 % 18 118/60 -- -- [facility]   01/17/25 2330 -- 77 98 % 18 152/102 -- -- [facility]   01/17/25 2322 97.5 °F (36.4 °C) 84 98 % 18 177/119 -- -- RANDY            Physical Exam  Vitals and nursing note reviewed.   Constitutional:       General: He is not in acute distress.     Appearance: Normal appearance. He is well-developed. He is not ill-appearing,  toxic-appearing or diaphoretic.   HENT:      Head: Normocephalic. Abrasion present.        Right Ear: External ear normal.      Left Ear: External ear normal.      Nose: Nose normal.      Mouth/Throat:      Mouth: Mucous membranes are moist.      Pharynx: Oropharynx is clear.   Eyes:      Conjunctiva/sclera: Conjunctivae normal.      Pupils: Pupils are equal, round, and reactive to light.   Cardiovascular:      Rate and Rhythm: Normal rate and regular rhythm.      Heart sounds: Normal heart sounds.   Pulmonary:      Effort: Pulmonary effort is normal. No respiratory distress.      Breath sounds: Normal breath sounds.   Abdominal:      General: Bowel sounds are normal. There is no distension.      Palpations: Abdomen is soft.      Tenderness: There is no abdominal tenderness. There is no guarding.   Musculoskeletal:         General: Normal range of motion.      Cervical back: Neck supple. No rigidity or tenderness.      Right lower leg: No edema.      Left lower leg: No edema.   Skin:     General: Skin is warm and dry.      Capillary Refill: Capillary refill takes less than 2 seconds.   Neurological:      Mental Status: He is alert and oriented to person, place, and time.   Psychiatric:         Mood and Affect: Mood normal.         Behavior: Behavior normal.         Results Reviewed       None            CT head without contrast   Final Interpretation by Fuad Valdez MD (01/18 0148)      No intracranial hemorrhage or calvarial fracture.                  Workstation performed: [id number]         CT spine cervical without contrast   Final Interpretation by Fuad Valdez MD (01/18 0418)      No cervical spine fracture or traumatic malalignment.                  Workstation performed: [id number]             Procedures    ED Medication and Procedure Management   Prior to Admission Medications   Prescriptions Last Dose Informant Patient Reported? Taking?   albuterol (PROVENTIL HFA,VENTOLIN HFA) 90 mcg/act inhaler Past Month   Yes Yes   Sig: Inhale 2 puffs every 6 (six) hours as needed   vitamin B-12 (VITAMIN B-12) 1,000 mcg tablet Past Month  No Yes   Sig: Take 1 tablet (1,000 mcg total) by mouth daily      Facility-Administered Medications: None     Patient's Medications   Discharge Prescriptions    No medications on file     No discharge procedures on file.  ED SEPSIS DOCUMENTATION   Time reflects when diagnosis was documented in both MDM as applicable and the Disposition within this note       Time User Action Codes Description Comment    1/18/2025  4:37 AM Himanshu Lewis [W19.XXXA] Fall, initial encounter     1/18/2025  4:37 AM Himanshu Lewis [S09.90XA] Injury of head, initial encounter     1/18/2025  4:37 AM Himanshu Lewis [F10.929] Alcohol intoxication (HCC)                  Himanshu Lewis DO  01/18/25 0438

## 2025-02-04 ENCOUNTER — HOSPITAL ENCOUNTER (OUTPATIENT)
Dept: NEUROLOGY | Facility: CLINIC | Age: 30
Discharge: HOME | End: 2025-02-04
Payer: MEDICARE

## 2025-02-04 DIAGNOSIS — R20.2 PARESTHESIA: ICD-10-CM

## 2025-02-04 DIAGNOSIS — R26.0 ATAXIC GAIT: ICD-10-CM

## 2025-02-04 DIAGNOSIS — R13.10 DYSPHAGIA, UNSPECIFIED TYPE: ICD-10-CM

## 2025-02-04 PROBLEM — R20.0 NUMBNESS: Status: ACTIVE | Noted: 2025-02-04

## 2025-02-04 PROCEDURE — 95911 NRV CNDJ TEST 9-10 STUDIES: CPT | Performed by: PSYCHIATRY & NEUROLOGY

## 2025-02-04 PROCEDURE — 95886 MUSC TEST DONE W/N TEST COMP: CPT | Performed by: PSYCHIATRY & NEUROLOGY

## 2025-03-05 ENCOUNTER — TELEPHONE (OUTPATIENT)
Dept: FAMILY MEDICINE CLINIC | Facility: CLINIC | Age: 30
End: 2025-03-05

## 2025-03-06 ENCOUNTER — RESULTS FOLLOW-UP (OUTPATIENT)
Dept: NEUROLOGY | Facility: CLINIC | Age: 30
End: 2025-03-06

## 2025-03-06 ENCOUNTER — TELEPHONE (OUTPATIENT)
Age: 30
End: 2025-03-06

## 2025-03-21 NOTE — TELEPHONE ENCOUNTER
Reached out to pt in regards to Medication Management wait list maintenance & to gather pt preferences. LVM for pt to contact intake dept.     Outside resources can be offered.    No available appts at this time.    Second attempt. Pt removed from wait lists.

## 2025-03-28 ENCOUNTER — TELEPHONE (OUTPATIENT)
Dept: FAMILY MEDICINE CLINIC | Facility: CLINIC | Age: 30
End: 2025-03-28

## 2025-04-01 ENCOUNTER — DOCUMENTATION (OUTPATIENT)
Dept: ADMINISTRATIVE | Facility: OTHER | Age: 30
End: 2025-04-01

## 2025-04-01 NOTE — PROGRESS NOTES
04/01/25 8:03 AM    Annual Wellness Visit outreach is not required, the practice has scheduled the AWV.    Thank you.  Pepper Bryant  [facility]

## 2025-06-27 ENCOUNTER — TELEPHONE (OUTPATIENT)
Age: 30
End: 2025-06-27

## 2025-06-27 NOTE — TELEPHONE ENCOUNTER
Patient LOV 10/15/2024 and a EMG 2/4/2025 with instructions to return after EMG     Patient scheduled in first available with provider for 1/28/2026 at 9 am       Patient   asking if can be seen sooner by CRNP or AP     Patient still experiencing Muscle spasms and contractions,in lower calves and cramping in their feet ans Stuttering    Please advise    Patient placed on the wait list     Thank you!

## 2025-08-12 ENCOUNTER — DOCUMENTATION (OUTPATIENT)
Dept: ADMINISTRATIVE | Facility: OTHER | Age: 30
End: 2025-08-12

## (undated) DEVICE — TELFA NON-ADHERENT ABSORBENT DRESSING: Brand: TELFA

## (undated) DEVICE — ETS45 RELOAD STANDARD 45MM: Brand: ENDOPATH

## (undated) DEVICE — INTENDED FOR TISSUE SEPARATION, AND OTHER PROCEDURES THAT REQUIRE A SHARP SURGICAL BLADE TO PUNCTURE OR CUT.: Brand: BARD-PARKER SAFETY BLADES SIZE 15, STERILE

## (undated) DEVICE — GLOVE SRG BIOGEL 6.5

## (undated) DEVICE — SPONGE 4 X 4 XRAY 16 PLY STRL LF RFD

## (undated) DEVICE — [HIGH FLOW INSUFFLATOR,  DO NOT USE IF PACKAGE IS DAMAGED,  KEEP DRY,  KEEP AWAY FROM SUNLIGHT,  PROTECT FROM HEAT AND RADIOACTIVE SOURCES.]: Brand: PNEUMOSURE

## (undated) DEVICE — ALLENTOWN LAP CHOLE APP PACK: Brand: CARDINAL HEALTH

## (undated) DEVICE — ENDOPATH XCEL BLADELESS TROCARS WITH STABILITY SLEEVES: Brand: ENDOPATH XCEL

## (undated) DEVICE — DRAPE EQUIPMENT RF WAND

## (undated) DEVICE — IRRIG ENDO FLO TUBING

## (undated) DEVICE — CHLORAPREP HI-LITE 26ML ORANGE

## (undated) DEVICE — ENDOPOUCH RETRIEVER SPECIMEN RETRIEVAL BAGS: Brand: ENDOPOUCH RETRIEVER

## (undated) DEVICE — SCD SEQUENTIAL COMPRESSION COMFORT SLEEVE MEDIUM KNEE LENGTH: Brand: KENDALL SCD

## (undated) DEVICE — HARMONIC ACE 5MM DIAMETER SHEARS 36CM SHAFT LENGTH + ADAPTIVE TISSUE TECHNOLOGY FOR USE WITH GENERATOR G11: Brand: HARMONIC ACE

## (undated) DEVICE — ENDOPATH XCEL UNIVERSAL TROCAR STABLILITY SLEEVES: Brand: ENDOPATH XCEL

## (undated) DEVICE — 3M™ STERI-STRIP™ REINFORCED ADHESIVE SKIN CLOSURES, R1547, 1/2 IN X 4 IN (12 MM X 100 MM), 6 STRIPS/ENVELOPE: Brand: 3M™ STERI-STRIP™

## (undated) DEVICE — BLUE HEAT SCOPE WARMER

## (undated) DEVICE — LAPAROSCOPIC SMOKE EVAC TUBING

## (undated) DEVICE — GLOVE SRG BIOGEL 7

## (undated) DEVICE — GLOVE INDICATOR PI UNDERGLOVE SZ 7 BLUE

## (undated) DEVICE — 3000CC GUARDIAN II: Brand: GUARDIAN

## (undated) DEVICE — X-RAY DETECTABLE SPONGES,16 PLY: Brand: VISTEC

## (undated) DEVICE — TRAY FOLEY 16FR URIMETER SURESTEP

## (undated) DEVICE — GLOVE INDICATOR PI UNDERGLOVE SZ 6.5 BLUE

## (undated) DEVICE — PENCIL ELECTROSURG E-Z CLEAN -0035H

## (undated) DEVICE — SUT PDS II 0 CT-1 36 IN Z346H

## (undated) DEVICE — SUT PDS II 1 CT-1 27 IN Z347H

## (undated) DEVICE — SUT MONOCRYL 4-0 PS-2 27 IN Y426H

## (undated) DEVICE — SPONGE LAP 18 X 18 IN STRL RFD

## (undated) DEVICE — ENDOPATH ETS-FLEX45 ARTICULATING ENDOSCOPIC LINEAR CUTTER, NO RELOAD: Brand: ENDOPATH

## (undated) DEVICE — CLOSURE DEVICE ENDO CLOSE

## (undated) DEVICE — 2963 MEDIPORE SOFT CLOTH TAPE 3 IN X 10 YD 12 RLS/CS: Brand: 3M™ MEDIPORE™

## (undated) DEVICE — 3M™ STERI-STRIP™ COMPOUND BENZOIN TINCTURE 40 BAGS/CARTON 4 CARTONS/CASE C1544: Brand: 3M™ STERI-STRIP™